# Patient Record
Sex: FEMALE | Race: WHITE | NOT HISPANIC OR LATINO | Employment: OTHER | URBAN - METROPOLITAN AREA
[De-identification: names, ages, dates, MRNs, and addresses within clinical notes are randomized per-mention and may not be internally consistent; named-entity substitution may affect disease eponyms.]

---

## 2017-02-27 ENCOUNTER — HOSPITAL ENCOUNTER (EMERGENCY)
Facility: HOSPITAL | Age: 72
Discharge: HOME | End: 2017-02-28
Attending: EMERGENCY MEDICINE | Admitting: EMERGENCY MEDICINE

## 2017-02-27 ENCOUNTER — APPOINTMENT (OUTPATIENT)
Dept: GENERAL RADIOLOGY | Facility: HOSPITAL | Age: 72
End: 2017-02-27

## 2017-02-27 DIAGNOSIS — R03.0 ELEVATED BLOOD-PRESSURE READING WITHOUT DIAGNOSIS OF HYPERTENSION: ICD-10-CM

## 2017-02-27 DIAGNOSIS — R09.1 PLEURISY: Primary | ICD-10-CM

## 2017-02-27 LAB
ALBUMIN SERPL-MCNC: 4.9 G/DL (ref 3.2–4.8)
ALBUMIN/GLOB SERPL: 1.5 G/DL (ref 1.5–2.5)
ALP SERPL-CCNC: 92 U/L (ref 25–100)
ALT SERPL W P-5'-P-CCNC: 16 U/L (ref 7–40)
ANION GAP SERPL CALCULATED.3IONS-SCNC: 6 MMOL/L (ref 3–11)
AST SERPL-CCNC: 18 U/L (ref 0–33)
BASOPHILS # BLD AUTO: 0.03 10*3/MM3 (ref 0–0.2)
BASOPHILS NFR BLD AUTO: 0.5 % (ref 0–1)
BILIRUB SERPL-MCNC: 0.5 MG/DL (ref 0.3–1.2)
BUN BLD-MCNC: 12 MG/DL (ref 9–23)
BUN/CREAT SERPL: 17.1 (ref 7–25)
CALCIUM SPEC-SCNC: 10.6 MG/DL (ref 8.7–10.4)
CHLORIDE SERPL-SCNC: 103 MMOL/L (ref 99–109)
CO2 SERPL-SCNC: 31 MMOL/L (ref 20–31)
CREAT BLD-MCNC: 0.7 MG/DL (ref 0.6–1.3)
DEPRECATED RDW RBC AUTO: 43.4 FL (ref 37–54)
EOSINOPHIL # BLD AUTO: 0.3 10*3/MM3 (ref 0.1–0.3)
EOSINOPHIL NFR BLD AUTO: 4.6 % (ref 0–3)
ERYTHROCYTE [DISTWIDTH] IN BLOOD BY AUTOMATED COUNT: 12.8 % (ref 11.3–14.5)
GFR SERPL CREATININE-BSD FRML MDRD: 82 ML/MIN/1.73
GLOBULIN UR ELPH-MCNC: 3.3 GM/DL
GLUCOSE BLD-MCNC: 103 MG/DL (ref 70–100)
HCT VFR BLD AUTO: 39.8 % (ref 34.5–44)
HGB BLD-MCNC: 13.1 G/DL (ref 11.5–15.5)
IMM GRANULOCYTES # BLD: 0 10*3/MM3 (ref 0–0.03)
IMM GRANULOCYTES NFR BLD: 0 % (ref 0–0.6)
LIPASE SERPL-CCNC: 35 U/L (ref 6–51)
LYMPHOCYTES # BLD AUTO: 2.1 10*3/MM3 (ref 0.6–4.8)
LYMPHOCYTES NFR BLD AUTO: 32.1 % (ref 24–44)
MCH RBC QN AUTO: 30.7 PG (ref 27–31)
MCHC RBC AUTO-ENTMCNC: 32.9 G/DL (ref 32–36)
MCV RBC AUTO: 93.2 FL (ref 80–99)
MONOCYTES # BLD AUTO: 0.45 10*3/MM3 (ref 0–1)
MONOCYTES NFR BLD AUTO: 6.9 % (ref 0–12)
NEUTROPHILS # BLD AUTO: 3.66 10*3/MM3 (ref 1.5–8.3)
NEUTROPHILS NFR BLD AUTO: 55.9 % (ref 41–71)
PLATELET # BLD AUTO: 352 10*3/MM3 (ref 150–450)
PMV BLD AUTO: 9.4 FL (ref 6–12)
POTASSIUM BLD-SCNC: 3.8 MMOL/L (ref 3.5–5.5)
PROT SERPL-MCNC: 8.2 G/DL (ref 5.7–8.2)
RBC # BLD AUTO: 4.27 10*6/MM3 (ref 3.89–5.14)
SODIUM BLD-SCNC: 140 MMOL/L (ref 132–146)
TROPONIN I SERPL-MCNC: 0 NG/ML (ref 0–0.07)
WBC NRBC COR # BLD: 6.54 10*3/MM3 (ref 3.5–10.8)

## 2017-02-27 PROCEDURE — 93005 ELECTROCARDIOGRAM TRACING: CPT

## 2017-02-27 PROCEDURE — 83690 ASSAY OF LIPASE: CPT | Performed by: EMERGENCY MEDICINE

## 2017-02-27 PROCEDURE — 71020 HC CHEST PA AND LATERAL: CPT

## 2017-02-27 PROCEDURE — 85025 COMPLETE CBC W/AUTO DIFF WBC: CPT | Performed by: EMERGENCY MEDICINE

## 2017-02-27 PROCEDURE — 99284 EMERGENCY DEPT VISIT MOD MDM: CPT

## 2017-02-27 PROCEDURE — 83880 ASSAY OF NATRIURETIC PEPTIDE: CPT | Performed by: EMERGENCY MEDICINE

## 2017-02-27 PROCEDURE — 80053 COMPREHEN METABOLIC PANEL: CPT | Performed by: EMERGENCY MEDICINE

## 2017-02-27 PROCEDURE — 84484 ASSAY OF TROPONIN QUANT: CPT

## 2017-02-27 RX ORDER — ASPIRIN 81 MG/1
324 TABLET, CHEWABLE ORAL ONCE
Status: COMPLETED | OUTPATIENT
Start: 2017-02-27 | End: 2017-02-28

## 2017-02-27 RX ORDER — SODIUM CHLORIDE 0.9 % (FLUSH) 0.9 %
10 SYRINGE (ML) INJECTION AS NEEDED
Status: DISCONTINUED | OUTPATIENT
Start: 2017-02-27 | End: 2017-02-28 | Stop reason: HOSPADM

## 2017-02-28 ENCOUNTER — APPOINTMENT (OUTPATIENT)
Dept: CT IMAGING | Facility: HOSPITAL | Age: 72
End: 2017-02-28

## 2017-02-28 VITALS
OXYGEN SATURATION: 94 % | BODY MASS INDEX: 22.78 KG/M2 | SYSTOLIC BLOOD PRESSURE: 123 MMHG | HEART RATE: 90 BPM | DIASTOLIC BLOOD PRESSURE: 58 MMHG | WEIGHT: 116 LBS | HEIGHT: 60 IN | TEMPERATURE: 97.5 F | RESPIRATION RATE: 20 BRPM

## 2017-02-28 LAB
BNP SERPL-MCNC: 40 PG/ML (ref 0–100)
HOLD SPECIMEN: NORMAL
HOLD SPECIMEN: NORMAL
WHOLE BLOOD HOLD SPECIMEN: NORMAL
WHOLE BLOOD HOLD SPECIMEN: NORMAL

## 2017-02-28 PROCEDURE — 71275 CT ANGIOGRAPHY CHEST: CPT

## 2017-02-28 PROCEDURE — 96361 HYDRATE IV INFUSION ADD-ON: CPT

## 2017-02-28 PROCEDURE — 96374 THER/PROPH/DIAG INJ IV PUSH: CPT

## 2017-02-28 PROCEDURE — 93005 ELECTROCARDIOGRAM TRACING: CPT | Performed by: EMERGENCY MEDICINE

## 2017-02-28 PROCEDURE — 25010000002 KETOROLAC TROMETHAMINE PER 15 MG: Performed by: EMERGENCY MEDICINE

## 2017-02-28 PROCEDURE — 0 IOPAMIDOL PER 1 ML: Performed by: EMERGENCY MEDICINE

## 2017-02-28 RX ORDER — KETOROLAC TROMETHAMINE 15 MG/ML
15 INJECTION, SOLUTION INTRAMUSCULAR; INTRAVENOUS ONCE
Status: COMPLETED | OUTPATIENT
Start: 2017-02-28 | End: 2017-02-28

## 2017-02-28 RX ADMIN — ASPIRIN 81 MG 324 MG: 81 TABLET ORAL at 00:31

## 2017-02-28 RX ADMIN — SODIUM CHLORIDE 1000 ML: 9 INJECTION, SOLUTION INTRAVENOUS at 00:31

## 2017-02-28 RX ADMIN — KETOROLAC TROMETHAMINE 15 MG: 15 INJECTION, SOLUTION INTRAMUSCULAR; INTRAVENOUS at 00:31

## 2017-02-28 RX ADMIN — IOPAMIDOL 40 ML: 755 INJECTION, SOLUTION INTRAVENOUS at 00:58

## 2017-06-26 ENCOUNTER — ANESTHESIA EVENT (OUTPATIENT)
Dept: PERIOP | Facility: HOSPITAL | Age: 72
End: 2017-06-26

## 2017-06-26 ENCOUNTER — ANESTHESIA (OUTPATIENT)
Dept: PERIOP | Facility: HOSPITAL | Age: 72
End: 2017-06-26

## 2017-06-26 ENCOUNTER — APPOINTMENT (OUTPATIENT)
Dept: CT IMAGING | Facility: HOSPITAL | Age: 72
End: 2017-06-26

## 2017-06-26 ENCOUNTER — APPOINTMENT (OUTPATIENT)
Dept: GENERAL RADIOLOGY | Facility: HOSPITAL | Age: 72
End: 2017-06-26

## 2017-06-26 ENCOUNTER — HOSPITAL ENCOUNTER (INPATIENT)
Facility: HOSPITAL | Age: 72
LOS: 3 days | Discharge: HOME | End: 2017-06-29
Attending: EMERGENCY MEDICINE | Admitting: SURGERY

## 2017-06-26 DIAGNOSIS — K56.609 SMALL BOWEL OBSTRUCTION (HCC): Primary | ICD-10-CM

## 2017-06-26 LAB
ALBUMIN SERPL-MCNC: 4.5 G/DL (ref 3.5–5)
ALBUMIN/GLOB SERPL: 1.6 G/DL (ref 1–2)
ALP SERPL-CCNC: 91 U/L (ref 38–126)
ALT SERPL W P-5'-P-CCNC: 22 U/L (ref 13–69)
ANION GAP SERPL CALCULATED.3IONS-SCNC: 15.9 MMOL/L
AST SERPL-CCNC: 18 U/L (ref 15–46)
BASOPHILS # BLD AUTO: 0.04 10*3/MM3 (ref 0–0.2)
BASOPHILS NFR BLD AUTO: 0.6 % (ref 0–2.5)
BILIRUB SERPL-MCNC: 0.5 MG/DL (ref 0.2–1.3)
BILIRUB UR QL STRIP: NEGATIVE
BUN BLD-MCNC: 17 MG/DL (ref 7–20)
BUN/CREAT SERPL: 21.3 (ref 7.1–23.5)
CALCIUM SPEC-SCNC: 10.2 MG/DL (ref 8.4–10.2)
CHLORIDE SERPL-SCNC: 103 MMOL/L (ref 98–107)
CLARITY UR: CLEAR
CO2 SERPL-SCNC: 27 MMOL/L (ref 26–30)
COLOR UR: YELLOW
CREAT BLD-MCNC: 0.8 MG/DL (ref 0.6–1.3)
D-LACTATE SERPL-SCNC: 1.1 MMOL/L (ref 0.5–2)
DEPRECATED RDW RBC AUTO: 39.8 FL (ref 37–54)
EOSINOPHIL # BLD AUTO: 0.25 10*3/MM3 (ref 0–0.7)
EOSINOPHIL NFR BLD AUTO: 4 % (ref 0–7)
ERYTHROCYTE [DISTWIDTH] IN BLOOD BY AUTOMATED COUNT: 12 % (ref 11.5–14.5)
GFR SERPL CREATININE-BSD FRML MDRD: 70 ML/MIN/1.73
GLOBULIN UR ELPH-MCNC: 2.8 GM/DL
GLUCOSE BLD-MCNC: 125 MG/DL (ref 74–98)
GLUCOSE UR STRIP-MCNC: NEGATIVE MG/DL
HCT VFR BLD AUTO: 36.3 % (ref 37–47)
HGB BLD-MCNC: 12 G/DL (ref 12–16)
HGB UR QL STRIP.AUTO: NEGATIVE
HOLD SPECIMEN: NORMAL
HOLD SPECIMEN: NORMAL
IMM GRANULOCYTES # BLD: 0.02 10*3/MM3 (ref 0–0.06)
IMM GRANULOCYTES NFR BLD: 0.3 % (ref 0–0.6)
KETONES UR QL STRIP: NEGATIVE
LEUKOCYTE ESTERASE UR QL STRIP.AUTO: NEGATIVE
LIPASE SERPL-CCNC: 116 U/L (ref 23–300)
LYMPHOCYTES # BLD AUTO: 1.86 10*3/MM3 (ref 0.6–3.4)
LYMPHOCYTES NFR BLD AUTO: 29.6 % (ref 10–50)
MCH RBC QN AUTO: 29.7 PG (ref 27–31)
MCHC RBC AUTO-ENTMCNC: 33.1 G/DL (ref 30–37)
MCV RBC AUTO: 89.9 FL (ref 81–99)
MONOCYTES # BLD AUTO: 0.39 10*3/MM3 (ref 0–0.9)
MONOCYTES NFR BLD AUTO: 6.2 % (ref 0–12)
NEUTROPHILS # BLD AUTO: 3.73 10*3/MM3 (ref 2–6.9)
NEUTROPHILS NFR BLD AUTO: 59.3 % (ref 37–80)
NITRITE UR QL STRIP: NEGATIVE
NRBC BLD MANUAL-RTO: 0 /100 WBC (ref 0–0)
PH UR STRIP.AUTO: 7 [PH] (ref 5–8)
PLATELET # BLD AUTO: 324 10*3/MM3 (ref 130–400)
PMV BLD AUTO: 9.9 FL (ref 6–12)
POTASSIUM BLD-SCNC: 3.9 MMOL/L (ref 3.5–5.1)
PROT SERPL-MCNC: 7.3 G/DL (ref 6.3–8.2)
PROT UR QL STRIP: NEGATIVE
RBC # BLD AUTO: 4.04 10*6/MM3 (ref 4.2–5.4)
SODIUM BLD-SCNC: 142 MMOL/L (ref 137–145)
SP GR UR STRIP: 1.01 (ref 1–1.03)
UROBILINOGEN UR QL STRIP: NORMAL
WBC NRBC COR # BLD: 6.29 10*3/MM3 (ref 4.8–10.8)
WHOLE BLOOD HOLD SPECIMEN: NORMAL
WHOLE BLOOD HOLD SPECIMEN: NORMAL

## 2017-06-26 PROCEDURE — 93005 ELECTROCARDIOGRAM TRACING: CPT | Performed by: EMERGENCY MEDICINE

## 2017-06-26 PROCEDURE — 81003 URINALYSIS AUTO W/O SCOPE: CPT | Performed by: EMERGENCY MEDICINE

## 2017-06-26 PROCEDURE — 83690 ASSAY OF LIPASE: CPT | Performed by: EMERGENCY MEDICINE

## 2017-06-26 PROCEDURE — 25010000003 AMPICILLIN-SULBACTAM PER 1.5 G: Performed by: SURGERY

## 2017-06-26 PROCEDURE — 25010000002 HYDROMORPHONE PER 4 MG

## 2017-06-26 PROCEDURE — 25010000002 MORPHINE PER 10 MG: Performed by: EMERGENCY MEDICINE

## 2017-06-26 PROCEDURE — 25010000002 PROPOFOL 200 MG/20ML EMULSION: Performed by: NURSE ANESTHETIST, CERTIFIED REGISTERED

## 2017-06-26 PROCEDURE — 25010000002 FENTANYL CITRATE (PF) 100 MCG/2ML SOLUTION: Performed by: NURSE ANESTHETIST, CERTIFIED REGISTERED

## 2017-06-26 PROCEDURE — 25010000002 SUCCINYLCHOLINE PER 20 MG: Performed by: NURSE ANESTHETIST, CERTIFIED REGISTERED

## 2017-06-26 PROCEDURE — 83605 ASSAY OF LACTIC ACID: CPT | Performed by: EMERGENCY MEDICINE

## 2017-06-26 PROCEDURE — 0 DIATRIZOATE MEGLUMINE & SODIUM PER 1 ML: Performed by: EMERGENCY MEDICINE

## 2017-06-26 PROCEDURE — 99285 EMERGENCY DEPT VISIT HI MDM: CPT

## 2017-06-26 PROCEDURE — 25010000002 PROMETHAZINE PER 50 MG

## 2017-06-26 PROCEDURE — 0DN80ZZ RELEASE SMALL INTESTINE, OPEN APPROACH: ICD-10-PCS | Performed by: SURGERY

## 2017-06-26 PROCEDURE — 0 IOPAMIDOL 61 % SOLUTION: Performed by: EMERGENCY MEDICINE

## 2017-06-26 PROCEDURE — 99223 1ST HOSP IP/OBS HIGH 75: CPT | Performed by: SURGERY

## 2017-06-26 PROCEDURE — 25010000002 ONDANSETRON PER 1 MG: Performed by: EMERGENCY MEDICINE

## 2017-06-26 PROCEDURE — 25010000002 HYDROMORPHONE PER 4 MG: Performed by: SURGERY

## 2017-06-26 PROCEDURE — 25010000002 ONDANSETRON PER 1 MG: Performed by: NURSE ANESTHETIST, CERTIFIED REGISTERED

## 2017-06-26 PROCEDURE — 80053 COMPREHEN METABOLIC PANEL: CPT | Performed by: EMERGENCY MEDICINE

## 2017-06-26 PROCEDURE — 44050 REDUCE BOWEL OBSTRUCTION: CPT | Performed by: SURGERY

## 2017-06-26 PROCEDURE — 25010000002 HEPARIN (PORCINE) PER 1000 UNITS

## 2017-06-26 PROCEDURE — 25010000002 DEXAMETHASONE PER 1 MG: Performed by: NURSE ANESTHETIST, CERTIFIED REGISTERED

## 2017-06-26 PROCEDURE — 74020 HC XR ABDOMEN FLAT & UPRIGHT: CPT

## 2017-06-26 PROCEDURE — 94799 UNLISTED PULMONARY SVC/PX: CPT

## 2017-06-26 PROCEDURE — 85025 COMPLETE CBC W/AUTO DIFF WBC: CPT | Performed by: EMERGENCY MEDICINE

## 2017-06-26 PROCEDURE — 74177 CT ABD & PELVIS W/CONTRAST: CPT

## 2017-06-26 PROCEDURE — 25010000002 HYDROMORPHONE PER 4 MG: Performed by: EMERGENCY MEDICINE

## 2017-06-26 RX ORDER — PANTOPRAZOLE SODIUM 40 MG/10ML
INJECTION, POWDER, LYOPHILIZED, FOR SOLUTION INTRAVENOUS
Status: COMPLETED
Start: 2017-06-26 | End: 2017-06-26

## 2017-06-26 RX ORDER — HEPARIN SODIUM 5000 [USP'U]/ML
5000 INJECTION, SOLUTION INTRAVENOUS; SUBCUTANEOUS ONCE
Status: COMPLETED | OUTPATIENT
Start: 2017-06-26 | End: 2017-06-26

## 2017-06-26 RX ORDER — SODIUM CHLORIDE, SODIUM LACTATE, POTASSIUM CHLORIDE, CALCIUM CHLORIDE 600; 310; 30; 20 MG/100ML; MG/100ML; MG/100ML; MG/100ML
1000 INJECTION, SOLUTION INTRAVENOUS ONCE
Status: COMPLETED | OUTPATIENT
Start: 2017-06-26 | End: 2017-06-26

## 2017-06-26 RX ORDER — PROMETHAZINE HYDROCHLORIDE 25 MG/1
25 TABLET ORAL ONCE AS NEEDED
Status: DISCONTINUED | OUTPATIENT
Start: 2017-06-26 | End: 2017-06-26 | Stop reason: HOSPADM

## 2017-06-26 RX ORDER — HYDROCODONE BITARTRATE AND ACETAMINOPHEN 7.5; 325 MG/1; MG/1
1 TABLET ORAL ONCE AS NEEDED
Status: DISCONTINUED | OUTPATIENT
Start: 2017-06-26 | End: 2017-06-26 | Stop reason: HOSPADM

## 2017-06-26 RX ORDER — SODIUM CHLORIDE 0.9 % (FLUSH) 0.9 %
10 SYRINGE (ML) INJECTION AS NEEDED
Status: DISCONTINUED | OUTPATIENT
Start: 2017-06-26 | End: 2017-06-29 | Stop reason: HOSPADM

## 2017-06-26 RX ORDER — DEXAMETHASONE SODIUM PHOSPHATE 4 MG/ML
INJECTION, SOLUTION INTRA-ARTICULAR; INTRALESIONAL; INTRAMUSCULAR; INTRAVENOUS; SOFT TISSUE AS NEEDED
Status: DISCONTINUED | OUTPATIENT
Start: 2017-06-26 | End: 2017-06-26 | Stop reason: SURG

## 2017-06-26 RX ORDER — ONDANSETRON 2 MG/ML
4 INJECTION INTRAMUSCULAR; INTRAVENOUS ONCE
Status: COMPLETED | OUTPATIENT
Start: 2017-06-26 | End: 2017-06-26

## 2017-06-26 RX ORDER — ONDANSETRON 4 MG/1
4 TABLET, ORALLY DISINTEGRATING ORAL EVERY 6 HOURS PRN
Status: DISCONTINUED | OUTPATIENT
Start: 2017-06-26 | End: 2017-06-29 | Stop reason: HOSPADM

## 2017-06-26 RX ORDER — NEOSTIGMINE METHYLSULFATE 5 MG/5 ML
SYRINGE (ML) INTRAVENOUS AS NEEDED
Status: DISCONTINUED | OUTPATIENT
Start: 2017-06-26 | End: 2017-06-26 | Stop reason: SURG

## 2017-06-26 RX ORDER — DEXTROSE AND SODIUM CHLORIDE 5; .45 G/100ML; G/100ML
100 INJECTION, SOLUTION INTRAVENOUS CONTINUOUS
Status: DISCONTINUED | OUTPATIENT
Start: 2017-06-26 | End: 2017-06-29 | Stop reason: HOSPADM

## 2017-06-26 RX ORDER — PROMETHAZINE HYDROCHLORIDE 25 MG/1
25 SUPPOSITORY RECTAL ONCE AS NEEDED
Status: DISCONTINUED | OUTPATIENT
Start: 2017-06-26 | End: 2017-06-26 | Stop reason: HOSPADM

## 2017-06-26 RX ORDER — HYDROCODONE BITARTRATE AND ACETAMINOPHEN 7.5; 325 MG/1; MG/1
1 TABLET ORAL EVERY 4 HOURS PRN
Status: DISCONTINUED | OUTPATIENT
Start: 2017-06-26 | End: 2017-06-29 | Stop reason: HOSPADM

## 2017-06-26 RX ORDER — SUCCINYLCHOLINE CHLORIDE 20 MG/ML
INJECTION INTRAMUSCULAR; INTRAVENOUS AS NEEDED
Status: DISCONTINUED | OUTPATIENT
Start: 2017-06-26 | End: 2017-06-26 | Stop reason: SURG

## 2017-06-26 RX ORDER — GLYCOPYRROLATE 0.2 MG/ML
INJECTION INTRAMUSCULAR; INTRAVENOUS AS NEEDED
Status: DISCONTINUED | OUTPATIENT
Start: 2017-06-26 | End: 2017-06-26 | Stop reason: SURG

## 2017-06-26 RX ORDER — MORPHINE SULFATE 4 MG/ML
4 INJECTION, SOLUTION INTRAMUSCULAR; INTRAVENOUS ONCE
Status: COMPLETED | OUTPATIENT
Start: 2017-06-26 | End: 2017-06-26

## 2017-06-26 RX ORDER — PROPOFOL 10 MG/ML
INJECTION, EMULSION INTRAVENOUS AS NEEDED
Status: DISCONTINUED | OUTPATIENT
Start: 2017-06-26 | End: 2017-06-26 | Stop reason: SURG

## 2017-06-26 RX ORDER — SODIUM CHLORIDE, SODIUM LACTATE, POTASSIUM CHLORIDE, CALCIUM CHLORIDE 600; 310; 30; 20 MG/100ML; MG/100ML; MG/100ML; MG/100ML
INJECTION, SOLUTION INTRAVENOUS CONTINUOUS PRN
Status: DISCONTINUED | OUTPATIENT
Start: 2017-06-26 | End: 2017-06-26 | Stop reason: SURG

## 2017-06-26 RX ORDER — MAGNESIUM HYDROXIDE 1200 MG/15ML
LIQUID ORAL AS NEEDED
Status: DISCONTINUED | OUTPATIENT
Start: 2017-06-26 | End: 2017-06-26 | Stop reason: HOSPADM

## 2017-06-26 RX ORDER — PANTOPRAZOLE SODIUM 40 MG/10ML
40 INJECTION, POWDER, LYOPHILIZED, FOR SOLUTION INTRAVENOUS ONCE
Status: COMPLETED | OUTPATIENT
Start: 2017-06-26 | End: 2017-06-26

## 2017-06-26 RX ORDER — LIDOCAINE HYDROCHLORIDE 10 MG/ML
0.5 INJECTION, SOLUTION INFILTRATION; PERINEURAL ONCE AS NEEDED
Status: DISCONTINUED | OUTPATIENT
Start: 2017-06-26 | End: 2017-06-26 | Stop reason: HOSPADM

## 2017-06-26 RX ORDER — ACETAMINOPHEN 10 MG/ML
INJECTION, SOLUTION INTRAVENOUS AS NEEDED
Status: DISCONTINUED | OUTPATIENT
Start: 2017-06-26 | End: 2017-06-26 | Stop reason: SURG

## 2017-06-26 RX ORDER — PROMETHAZINE HYDROCHLORIDE 25 MG/ML
INJECTION, SOLUTION INTRAMUSCULAR; INTRAVENOUS
Status: COMPLETED
Start: 2017-06-26 | End: 2017-06-26

## 2017-06-26 RX ORDER — SODIUM CHLORIDE 0.9 % (FLUSH) 0.9 %
1-10 SYRINGE (ML) INJECTION AS NEEDED
Status: DISCONTINUED | OUTPATIENT
Start: 2017-06-26 | End: 2017-06-29 | Stop reason: HOSPADM

## 2017-06-26 RX ORDER — ACETAMINOPHEN 10 MG/ML
INJECTION, SOLUTION INTRAVENOUS
Status: DISPENSED
Start: 2017-06-26 | End: 2017-06-26

## 2017-06-26 RX ORDER — ONDANSETRON 4 MG/1
4 TABLET, FILM COATED ORAL EVERY 6 HOURS PRN
Status: DISCONTINUED | OUTPATIENT
Start: 2017-06-26 | End: 2017-06-29 | Stop reason: HOSPADM

## 2017-06-26 RX ORDER — SODIUM CHLORIDE, SODIUM LACTATE, POTASSIUM CHLORIDE, CALCIUM CHLORIDE 600; 310; 30; 20 MG/100ML; MG/100ML; MG/100ML; MG/100ML
1000 INJECTION, SOLUTION INTRAVENOUS CONTINUOUS PRN
Status: DISCONTINUED | OUTPATIENT
Start: 2017-06-26 | End: 2017-06-26 | Stop reason: HOSPADM

## 2017-06-26 RX ORDER — ONDANSETRON 2 MG/ML
4 INJECTION INTRAMUSCULAR; INTRAVENOUS ONCE AS NEEDED
Status: DISCONTINUED | OUTPATIENT
Start: 2017-06-26 | End: 2017-06-26 | Stop reason: HOSPADM

## 2017-06-26 RX ORDER — ONDANSETRON 2 MG/ML
4 INJECTION INTRAMUSCULAR; INTRAVENOUS EVERY 6 HOURS PRN
Status: DISCONTINUED | OUTPATIENT
Start: 2017-06-26 | End: 2017-06-29 | Stop reason: HOSPADM

## 2017-06-26 RX ORDER — BUPIVACAINE HYDROCHLORIDE 5 MG/ML
INJECTION, SOLUTION EPIDURAL; INTRACAUDAL
Status: DISCONTINUED
Start: 2017-06-26 | End: 2017-06-26 | Stop reason: WASHOUT

## 2017-06-26 RX ORDER — PROMETHAZINE HYDROCHLORIDE 25 MG/ML
12.5 INJECTION, SOLUTION INTRAMUSCULAR; INTRAVENOUS ONCE
Status: COMPLETED | OUTPATIENT
Start: 2017-06-26 | End: 2017-06-26

## 2017-06-26 RX ORDER — PROMETHAZINE HYDROCHLORIDE 25 MG/ML
6.25 INJECTION, SOLUTION INTRAMUSCULAR; INTRAVENOUS ONCE AS NEEDED
Status: DISCONTINUED | OUTPATIENT
Start: 2017-06-26 | End: 2017-06-26 | Stop reason: HOSPADM

## 2017-06-26 RX ORDER — ONDANSETRON 2 MG/ML
INJECTION INTRAMUSCULAR; INTRAVENOUS AS NEEDED
Status: DISCONTINUED | OUTPATIENT
Start: 2017-06-26 | End: 2017-06-26 | Stop reason: SURG

## 2017-06-26 RX ORDER — FENTANYL CITRATE 50 UG/ML
INJECTION, SOLUTION INTRAMUSCULAR; INTRAVENOUS AS NEEDED
Status: DISCONTINUED | OUTPATIENT
Start: 2017-06-26 | End: 2017-06-26 | Stop reason: SURG

## 2017-06-26 RX ORDER — ACETAMINOPHEN 325 MG/1
650 TABLET ORAL EVERY 4 HOURS PRN
Status: DISCONTINUED | OUTPATIENT
Start: 2017-06-26 | End: 2017-06-29 | Stop reason: HOSPADM

## 2017-06-26 RX ORDER — NALOXONE HCL 0.4 MG/ML
0.1 VIAL (ML) INJECTION
Status: DISCONTINUED | OUTPATIENT
Start: 2017-06-26 | End: 2017-06-29 | Stop reason: HOSPADM

## 2017-06-26 RX ORDER — HEPARIN SODIUM 5000 [USP'U]/ML
INJECTION, SOLUTION INTRAVENOUS; SUBCUTANEOUS
Status: COMPLETED
Start: 2017-06-26 | End: 2017-06-26

## 2017-06-26 RX ORDER — ROCURONIUM BROMIDE 10 MG/ML
INJECTION, SOLUTION INTRAVENOUS AS NEEDED
Status: DISCONTINUED | OUTPATIENT
Start: 2017-06-26 | End: 2017-06-26 | Stop reason: SURG

## 2017-06-26 RX ORDER — SODIUM CHLORIDE 0.9 % (FLUSH) 0.9 %
3 SYRINGE (ML) INJECTION AS NEEDED
Status: DISCONTINUED | OUTPATIENT
Start: 2017-06-26 | End: 2017-06-26 | Stop reason: HOSPADM

## 2017-06-26 RX ORDER — MEPERIDINE HYDROCHLORIDE 25 MG/ML
12.5 INJECTION INTRAMUSCULAR; INTRAVENOUS; SUBCUTANEOUS
Status: DISCONTINUED | OUTPATIENT
Start: 2017-06-26 | End: 2017-06-26 | Stop reason: HOSPADM

## 2017-06-26 RX ADMIN — PROPOFOL 200 MG: 10 INJECTION, EMULSION INTRAVENOUS at 08:48

## 2017-06-26 RX ADMIN — GLYCOPYRROLATE 0.4 MG: 0.2 INJECTION, SOLUTION INTRAMUSCULAR; INTRAVENOUS at 09:51

## 2017-06-26 RX ADMIN — HYDROCODONE BITARTRATE AND ACETAMINOPHEN 1 TABLET: 7.5; 325 TABLET ORAL at 17:11

## 2017-06-26 RX ADMIN — HYDROMORPHONE HYDROCHLORIDE 0.5 MG: 1 INJECTION, SOLUTION INTRAMUSCULAR; INTRAVENOUS; SUBCUTANEOUS at 12:07

## 2017-06-26 RX ADMIN — HEPARIN SODIUM 5000 UNITS: 5000 INJECTION, SOLUTION INTRAVENOUS; SUBCUTANEOUS at 08:26

## 2017-06-26 RX ADMIN — SODIUM CHLORIDE, POTASSIUM CHLORIDE, SODIUM LACTATE AND CALCIUM CHLORIDE 1000 ML: 600; 310; 30; 20 INJECTION, SOLUTION INTRAVENOUS at 03:58

## 2017-06-26 RX ADMIN — DEXAMETHASONE SODIUM PHOSPHATE 4 MG: 4 INJECTION, SOLUTION INTRAMUSCULAR; INTRAVENOUS at 09:25

## 2017-06-26 RX ADMIN — SODIUM CHLORIDE, POTASSIUM CHLORIDE, SODIUM LACTATE AND CALCIUM CHLORIDE 1000 ML: 600; 310; 30; 20 INJECTION, SOLUTION INTRAVENOUS at 08:20

## 2017-06-26 RX ADMIN — Medication 0.5 MG: at 11:04

## 2017-06-26 RX ADMIN — PROMETHAZINE HYDROCHLORIDE 12.5 MG: 25 INJECTION INTRAMUSCULAR; INTRAVENOUS at 04:09

## 2017-06-26 RX ADMIN — MORPHINE SULFATE 4 MG: 4 INJECTION, SOLUTION INTRAMUSCULAR; INTRAVENOUS at 03:58

## 2017-06-26 RX ADMIN — HYDROCODONE BITARTRATE AND ACETAMINOPHEN 1 TABLET: 7.5; 325 TABLET ORAL at 23:21

## 2017-06-26 RX ADMIN — PANTOPRAZOLE SODIUM 40 MG: 40 INJECTION, POWDER, FOR SOLUTION INTRAVENOUS at 08:26

## 2017-06-26 RX ADMIN — ONDANSETRON 4 MG: 2 INJECTION INTRAMUSCULAR; INTRAVENOUS at 03:58

## 2017-06-26 RX ADMIN — LIDOCAINE HYDROCHLORIDE 60 MG: 20 INJECTION, SOLUTION INTRAVENOUS at 08:48

## 2017-06-26 RX ADMIN — HYDROMORPHONE HYDROCHLORIDE 0.5 MG: 1 INJECTION, SOLUTION INTRAMUSCULAR; INTRAVENOUS; SUBCUTANEOUS at 06:15

## 2017-06-26 RX ADMIN — Medication 3 MG: at 09:51

## 2017-06-26 RX ADMIN — DIATRIZOATE MEGLUMINE AND DIATRIZOATE SODIUM 15 ML: 660; 100 LIQUID ORAL; RECTAL at 07:36

## 2017-06-26 RX ADMIN — ACETAMINOPHEN 1000 MG: 10 INJECTION, SOLUTION INTRAVENOUS at 09:09

## 2017-06-26 RX ADMIN — DEXTROSE AND SODIUM CHLORIDE 100 ML/HR: 5; 450 INJECTION, SOLUTION INTRAVENOUS at 10:30

## 2017-06-26 RX ADMIN — SUCCINYLCHOLINE CHLORIDE 104.4 MG: 20 INJECTION, SOLUTION INTRAMUSCULAR; INTRAVENOUS at 08:48

## 2017-06-26 RX ADMIN — ONDANSETRON 4 MG: 2 INJECTION INTRAMUSCULAR; INTRAVENOUS at 06:23

## 2017-06-26 RX ADMIN — ROCURONIUM BROMIDE 5 MG: 10 INJECTION INTRAVENOUS at 08:48

## 2017-06-26 RX ADMIN — SODIUM CHLORIDE 3 G: 9 INJECTION, SOLUTION INTRAVENOUS at 08:52

## 2017-06-26 RX ADMIN — SODIUM CHLORIDE, POTASSIUM CHLORIDE, SODIUM LACTATE AND CALCIUM CHLORIDE: 600; 310; 30; 20 INJECTION, SOLUTION INTRAVENOUS at 08:58

## 2017-06-26 RX ADMIN — ROCURONIUM BROMIDE 20 MG: 10 INJECTION INTRAVENOUS at 09:03

## 2017-06-26 RX ADMIN — PROMETHAZINE HYDROCHLORIDE 12.5 MG: 25 INJECTION, SOLUTION INTRAMUSCULAR; INTRAVENOUS at 04:09

## 2017-06-26 RX ADMIN — FENTANYL CITRATE 100 MCG: 50 INJECTION, SOLUTION INTRAMUSCULAR; INTRAVENOUS at 08:45

## 2017-06-26 RX ADMIN — ONDANSETRON 4 MG: 2 INJECTION INTRAMUSCULAR; INTRAVENOUS at 09:25

## 2017-06-26 RX ADMIN — SODIUM CHLORIDE, POTASSIUM CHLORIDE, SODIUM LACTATE AND CALCIUM CHLORIDE: 600; 310; 30; 20 INJECTION, SOLUTION INTRAVENOUS at 09:35

## 2017-06-26 RX ADMIN — IOPAMIDOL 100 ML: 612 INJECTION, SOLUTION INTRAVENOUS at 04:50

## 2017-06-26 RX ADMIN — PANTOPRAZOLE SODIUM 40 MG: 40 INJECTION, POWDER, LYOPHILIZED, FOR SOLUTION INTRAVENOUS at 08:26

## 2017-06-26 RX ADMIN — HYDROMORPHONE HYDROCHLORIDE 0.5 MG: 1 INJECTION, SOLUTION INTRAMUSCULAR; INTRAVENOUS; SUBCUTANEOUS at 11:04

## 2017-06-26 NOTE — ANESTHESIA PREPROCEDURE EVALUATION
Anesthesia Evaluation     Patient summary reviewed and Nursing notes reviewed   NPO Solid Status: > 8 hours  NPO Liquid Status: > 8 hours     Airway   Mallampati: II  TM distance: >3 FB  Neck ROM: full  no difficulty expected  Dental      Pulmonary    Cardiovascular   Exercise tolerance: excellent (>7 METS)    ECG reviewed  Rhythm: regular  Rate: normal    (+) valvular problems/murmurs murmur,       Neuro/Psych  GI/Hepatic/Renal/Endo    (+)  GERD,     Musculoskeletal     Abdominal    Substance History      OB/GYN          Other   (+) arthritis                                     Anesthesia Plan    ASA 2 - emergent     general and regional   (Discussed possible TAP block for postop pain control .)  intravenous induction   Anesthetic plan and risks discussed with patient.    Plan discussed with CRNA.

## 2017-06-26 NOTE — ANESTHESIA POSTPROCEDURE EVALUATION
"Patient: Prisca Ellis    Procedure Summary     Date Anesthesia Start Anesthesia Stop Room / Location    06/26/17 0844 1012 [facility] OR 5 / [facility]       Procedure Diagnosis Surgeon Provider    LAPAROTOMY EXPLORATORY WITH LYSIS OF ADHESIONS; REDUCTION OF HERNIA   (N/A Abdomen) No diagnosis on file. MD Patricia Woodward CRNA          Anesthesia Type: general, regional  Last vitals    /50 (BP Location: Left arm, Patient Position: Lying)  Pulse 96  Temp 98.3 °F (36.8 °C) (Oral)   Resp 16  Ht 60\" (152.4 cm)  Wt 115 lb (52.2 kg)  SpO2 100%  BMI 22.46 kg/m2    BP      Temp      Pulse     Resp      SpO2        Post Anesthesia Care and Evaluation    Patient location during evaluation: PACU  Patient participation: complete - patient participated  Level of consciousness: awake and alert  Pain score: 3  Pain management: satisfactory to patient  Airway patency: patent  Anesthetic complications: No anesthetic complications  PONV Status: none  Cardiovascular status: acceptable and stable  Respiratory status: acceptable  Hydration status: acceptable      "

## 2017-06-26 NOTE — ANESTHESIA PROCEDURE NOTES
Airway  Airway not difficult    General Information and Staff    Patient location during procedure: OR  CRNA: PEPPER URBANO    Indications and Patient Condition  Indications for airway management: airway protection    Preoxygenated: yes  Mask difficulty assessment: 0 - not attempted (RSI)    Final Airway Details  Final airway type: endotracheal airway      Successful airway: ETT  Cuffed: yes   Successful intubation technique: direct laryngoscopy  Facilitating devices/methods: intubating stylet  Endotracheal tube insertion site: oral  Blade: Rolly  Blade size: #3  ETT size: 7.0 mm  Cormack-Lehane Classification: grade I - full view of glottis  Placement verified by: chest auscultation and capnometry   Cuff volume (mL): 4  Measured from: lips  ETT to lips (cm): 18  Number of attempts at approach: 1    Additional Comments  Teeth as pre-op

## 2017-06-26 NOTE — ANESTHESIA PROCEDURE NOTES
Peripheral Block    Patient location during procedure: OR  Start time: 6/26/2017 9:52 AM  Stop time: 6/26/2017 10:00 AM  Reason for block: at surgeon's request and post-op pain management  Performed by  CRNA: PEPPER URBANO  Preanesthetic Checklist  Completed: patient identified, site marked, surgical consent, pre-op evaluation, timeout performed, IV checked, risks and benefits discussed and monitors and equipment checked  Peripheral Block Prep:  Sterile barriers:cap, gloves, sterile barriers, mask and gown  Prep: ChloraPrep  Patient monitoring: blood pressure monitoring, continuous pulse oximetry and EKG  Peripheral Procedure  Sedation:yes (under GA)  Guidance:ultrasound guided  Images:still images obtained    Laterality:Bilateral  Block Type:TAP (bilateral)  Injection Technique:single-shot  Needle Type:short-bevel  Needle Gauge:20 G    Medications  Comment:Block Injection:  LA dose divided between Right and Left block      Local Injected:bupivacaine 0.25% Local Amount Injected:60mL  Post Assessment  Injection Assessment: negative aspiration for heme, incremental injection and no paresthesia on injection  Patient Tolerance:comfortable throughout block  Complications:no  Additional Notes  The pt was placed in the Supine Position     Under Ultrasound guidance, a BBraun 4inch 360 degree needle was advanced with Normal Saline hydro dissection of tissue.  The Internal Oblique and Transversus Abdominus muscles where visualized.  At or before the aponeurosis of Internal Oblique, local anesthetic spread was visualized in the Transversus Abdominus Plane. Injection was made incrementally with aspiration every 5 mls.  There was no  intravascular injection,  injection pressure was normal, there was no neural injection, and the procedure was completed without difficulty.  Thank You.

## 2017-06-27 PROCEDURE — 99024 POSTOP FOLLOW-UP VISIT: CPT | Performed by: SURGERY

## 2017-06-27 PROCEDURE — 25010000002 HYDROMORPHONE PER 4 MG: Performed by: SURGERY

## 2017-06-27 RX ORDER — DOCUSATE SODIUM 100 MG/1
100 CAPSULE, LIQUID FILLED ORAL 2 TIMES DAILY
Status: DISCONTINUED | OUTPATIENT
Start: 2017-06-27 | End: 2017-06-29 | Stop reason: HOSPADM

## 2017-06-27 RX ADMIN — DOCUSATE SODIUM 100 MG: 100 CAPSULE, LIQUID FILLED ORAL at 21:20

## 2017-06-27 RX ADMIN — HYDROCODONE BITARTRATE AND ACETAMINOPHEN 1 TABLET: 7.5; 325 TABLET ORAL at 09:43

## 2017-06-27 RX ADMIN — HYDROCODONE BITARTRATE AND ACETAMINOPHEN 1 TABLET: 7.5; 325 TABLET ORAL at 17:43

## 2017-06-27 RX ADMIN — HYDROMORPHONE HYDROCHLORIDE 0.5 MG: 1 INJECTION, SOLUTION INTRAMUSCULAR; INTRAVENOUS; SUBCUTANEOUS at 04:08

## 2017-06-27 RX ADMIN — DOCUSATE SODIUM 100 MG: 100 CAPSULE, LIQUID FILLED ORAL at 09:43

## 2017-06-27 RX ADMIN — HYDROCODONE BITARTRATE AND ACETAMINOPHEN 1 TABLET: 7.5; 325 TABLET ORAL at 14:07

## 2017-06-27 RX ADMIN — ONDANSETRON 4 MG: 4 TABLET, FILM COATED ORAL at 19:45

## 2017-06-28 PROCEDURE — 99024 POSTOP FOLLOW-UP VISIT: CPT | Performed by: SURGERY

## 2017-06-28 RX ADMIN — NYSTATIN 500000 UNITS: 500000 SUSPENSION ORAL at 17:38

## 2017-06-28 RX ADMIN — HYDROCODONE BITARTRATE AND ACETAMINOPHEN 1 TABLET: 7.5; 325 TABLET ORAL at 06:12

## 2017-06-28 RX ADMIN — NYSTATIN 500000 UNITS: 500000 SUSPENSION ORAL at 22:15

## 2017-06-28 RX ADMIN — DOCUSATE SODIUM 100 MG: 100 CAPSULE, LIQUID FILLED ORAL at 09:10

## 2017-06-28 RX ADMIN — NYSTATIN 500000 UNITS: 500000 SUSPENSION ORAL at 12:46

## 2017-06-28 RX ADMIN — HYDROCODONE BITARTRATE AND ACETAMINOPHEN 1 TABLET: 7.5; 325 TABLET ORAL at 22:15

## 2017-06-28 RX ADMIN — DOCUSATE SODIUM 100 MG: 100 CAPSULE, LIQUID FILLED ORAL at 20:45

## 2017-06-28 RX ADMIN — ONDANSETRON 4 MG: 4 TABLET, FILM COATED ORAL at 06:12

## 2017-06-29 VITALS
SYSTOLIC BLOOD PRESSURE: 105 MMHG | WEIGHT: 115 LBS | DIASTOLIC BLOOD PRESSURE: 49 MMHG | HEART RATE: 77 BPM | RESPIRATION RATE: 16 BRPM | TEMPERATURE: 97.6 F | BODY MASS INDEX: 22.58 KG/M2 | HEIGHT: 60 IN | OXYGEN SATURATION: 95 %

## 2017-06-29 PROCEDURE — 99024 POSTOP FOLLOW-UP VISIT: CPT | Performed by: SURGERY

## 2017-06-29 RX ORDER — ONDANSETRON 2 MG/ML
4 INJECTION INTRAMUSCULAR; INTRAVENOUS ONCE AS NEEDED
Status: DISCONTINUED | OUTPATIENT
Start: 2017-06-29 | End: 2017-06-29 | Stop reason: HOSPADM

## 2017-06-29 RX ORDER — HYDROCODONE BITARTRATE AND ACETAMINOPHEN 7.5; 325 MG/1; MG/1
1 TABLET ORAL EVERY 4 HOURS PRN
Qty: 30 TABLET | Refills: 0 | Status: SHIPPED | OUTPATIENT
Start: 2017-06-29 | End: 2017-07-06

## 2017-06-29 RX ORDER — PROMETHAZINE HYDROCHLORIDE 25 MG/ML
12.5 INJECTION, SOLUTION INTRAMUSCULAR; INTRAVENOUS ONCE AS NEEDED
Status: DISCONTINUED | OUTPATIENT
Start: 2017-06-29 | End: 2017-06-29 | Stop reason: HOSPADM

## 2017-06-29 RX ORDER — HYDROCODONE BITARTRATE AND ACETAMINOPHEN 5; 325 MG/1; MG/1
1-2 TABLET ORAL EVERY 4 HOURS PRN
Qty: 30 TABLET | Refills: 0 | Status: SHIPPED | OUTPATIENT
Start: 2017-06-29 | End: 2017-07-07

## 2017-06-29 RX ORDER — LORAZEPAM 0.5 MG/1
0.5 TABLET ORAL ONCE
Status: DISCONTINUED | OUTPATIENT
Start: 2017-06-29 | End: 2017-06-29 | Stop reason: HOSPADM

## 2017-06-29 RX ADMIN — NYSTATIN 500000 UNITS: 500000 SUSPENSION ORAL at 12:17

## 2017-06-29 RX ADMIN — NYSTATIN 500000 UNITS: 500000 SUSPENSION ORAL at 09:13

## 2017-06-29 RX ADMIN — DOCUSATE SODIUM 100 MG: 100 CAPSULE, LIQUID FILLED ORAL at 09:13

## 2017-07-07 ENCOUNTER — OFFICE VISIT (OUTPATIENT)
Dept: SURGERY | Facility: CLINIC | Age: 72
End: 2017-07-07

## 2017-07-07 VITALS
TEMPERATURE: 98.5 F | DIASTOLIC BLOOD PRESSURE: 70 MMHG | OXYGEN SATURATION: 99 % | BODY MASS INDEX: 22.58 KG/M2 | HEART RATE: 80 BPM | SYSTOLIC BLOOD PRESSURE: 119 MMHG | HEIGHT: 60 IN | RESPIRATION RATE: 16 BRPM | WEIGHT: 115 LBS

## 2017-07-07 DIAGNOSIS — Z48.89 POSTOPERATIVE VISIT: Primary | ICD-10-CM

## 2017-07-07 PROCEDURE — 99024 POSTOP FOLLOW-UP VISIT: CPT | Performed by: SURGERY

## 2017-07-07 NOTE — PROGRESS NOTES
"Patient: Prisca Ellis    YOB: 1945    Date: 07/07/2017    Primary Care Provider: Alva Bear MD    Reason for Consultation: post op visit for small bowel onstruction    Chief Complaint:   Chief Complaint   Patient presents with   • Post-op Follow-up     bowel obstruction       History: Patient was admitted to [facility] for a small bowel obstruction and an internal hernia. She had a lap exploratory with lysis of adhesions and a reduction of hernia. Patient states she is feeling better. She is still sore in her abdomen and has been sleeping on her back and side.     Review of Systems   Constitutional: Negative for chills, fever and unexpected weight change.   HENT: Negative for hearing loss, trouble swallowing and voice change.    Eyes: Negative for visual disturbance.   Respiratory: Negative for apnea, cough, chest tightness, shortness of breath and wheezing.    Cardiovascular: Negative for chest pain, palpitations and leg swelling.   Gastrointestinal: Positive for abdominal pain. Negative for abdominal distention, anal bleeding, blood in stool, constipation, diarrhea, nausea, rectal pain and vomiting.   Endocrine: Negative for cold intolerance and heat intolerance.   Genitourinary: Negative for difficulty urinating, dysuria and flank pain.   Musculoskeletal: Negative for back pain and gait problem.   Skin: Negative for color change, rash and wound.   Neurological: Negative for dizziness, syncope, speech difficulty, weakness, light-headedness, numbness and headaches.   Hematological: Negative for adenopathy. Does not bruise/bleed easily.   Psychiatric/Behavioral: Negative for confusion. The patient is not nervous/anxious.        Vital Signs  /70  Pulse 80  Temp 98.5 °F (36.9 °C) (Temporal Artery )   Resp 16  Ht 60\" (152.4 cm)  Wt 115 lb (52.2 kg)  SpO2 99%  BMI 22.46 kg/m2    Allergies:  Allergies   Allergen Reactions   • Sulfa Antibiotics        Medications:  No current " outpatient prescriptions on file.    Physical Exam:   General Appearance:    Alert, cooperative, in no acute distress   Head:    Normocephalic, without obvious abnormality, atraumatic   Lungs:     Clear to auscultation,respirations regular, even and                  unlabored    Heart:    Regular rhythm and normal rate, normal S1 and S2, no            murmur, no gallop, no rub, no click   Abdomen:     Normal bowel sounds, no masses, no organomegaly, soft        non-tender, non-distended, no guarding, no rebound                tenderness   Extremities:   Moves all extremities well, no edema, no cyanosis, no             redness   Pulses:   Pulses palpable and equal bilaterally   Skin:   No bleeding, bruising or rash      Assessment/Plan     1. Postoperative visit      Patient is to stay on limited activity for 5 more weeks.  Follow-up as needed.    Electronically signed by Sami Griffiths MD  07/07/17

## 2020-01-22 ENCOUNTER — APPOINTMENT (OUTPATIENT)
Dept: CT IMAGING | Facility: HOSPITAL | Age: 75
End: 2020-01-22

## 2020-01-22 ENCOUNTER — HOSPITAL ENCOUNTER (EMERGENCY)
Facility: HOSPITAL | Age: 75
Discharge: HOME | End: 2020-01-22
Attending: EMERGENCY MEDICINE | Admitting: EMERGENCY MEDICINE

## 2020-01-22 VITALS
HEIGHT: 60 IN | RESPIRATION RATE: 15 BRPM | BODY MASS INDEX: 21.6 KG/M2 | OXYGEN SATURATION: 99 % | TEMPERATURE: 98 F | DIASTOLIC BLOOD PRESSURE: 78 MMHG | HEART RATE: 78 BPM | SYSTOLIC BLOOD PRESSURE: 138 MMHG | WEIGHT: 110 LBS

## 2020-01-22 DIAGNOSIS — S39.012A BACK STRAIN, INITIAL ENCOUNTER: ICD-10-CM
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
## 2020-01-22 DIAGNOSIS — M53.9 MULTILEVEL DEGENERATIVE DISC DISEASE: ICD-10-CM

## 2020-01-22 PROCEDURE — 72128 CT CHEST SPINE W/O DYE: CPT

## 2020-01-22 PROCEDURE — 72125 CT NECK SPINE W/O DYE: CPT

## 2020-01-22 PROCEDURE — 99283 EMERGENCY DEPT VISIT LOW MDM: CPT

## 2020-01-22 PROCEDURE — 72131 CT LUMBAR SPINE W/O DYE: CPT

## 2020-01-22 RX ORDER — METHOCARBAMOL 500 MG/1
500 TABLET, FILM COATED ORAL 4 TIMES DAILY PRN
Qty: 8 TABLET | Refills: 0 | Status: SHIPPED | OUTPATIENT
Start: 2020-01-22 | End: 2020-01-24

## 2020-01-22 RX ORDER — ACETAMINOPHEN 500 MG
1000 TABLET ORAL ONCE
Status: COMPLETED | OUTPATIENT
Start: 2020-01-22 | End: 2020-01-22

## 2020-01-22 RX ADMIN — ACETAMINOPHEN 1000 MG: 500 TABLET, FILM COATED ORAL at 16:20
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
## 2020-01-22 NOTE — DISCHARGE INSTRUCTIONS
You will likely be sore for the next few days.  Take all home medications as directed.  Take Tylenol 500 mg every 6 hours.  May take Robaxin as needed for muscle strain as directed.  This medication may make you sleepy so do not drive while taking the medication.  May apply warm compresses, icy hot, massage the areas to help with pain.  Follow-up with your PCP as scheduled.  Return to the ER for any change, worsening symptoms, or any additional concerns.

## 2020-04-12 ENCOUNTER — APPOINTMENT (OUTPATIENT)
Dept: GENERAL RADIOLOGY | Facility: HOSPITAL | Age: 75
End: 2020-04-12

## 2020-04-12 ENCOUNTER — ANESTHESIA (OUTPATIENT)
Dept: PERIOP | Facility: HOSPITAL | Age: 75
End: 2020-04-12

## 2020-04-12 ENCOUNTER — HOSPITAL ENCOUNTER (INPATIENT)
Facility: HOSPITAL | Age: 75
LOS: 4 days | Discharge: NURSING, REHAB OR LONG-TERM CARE | End: 2020-04-16
Attending: EMERGENCY MEDICINE | Admitting: INTERNAL MEDICINE

## 2020-04-12 ENCOUNTER — ANESTHESIA EVENT (OUTPATIENT)
Dept: PERIOP | Facility: HOSPITAL | Age: 75
End: 2020-04-12

## 2020-04-12 DIAGNOSIS — S72.001A CLOSED FRACTURE OF RIGHT HIP, INITIAL ENCOUNTER (HCC): ICD-10-CM

## 2020-04-12 DIAGNOSIS — Z78.9 IMPAIRED MOBILITY AND ADLS: ICD-10-CM

## 2020-04-12 DIAGNOSIS — Z74.09 IMPAIRED MOBILITY AND ADLS: ICD-10-CM

## 2020-04-12 PROBLEM — M80.80XA OSTEOPOROSIS WITH FRACTURE: Status: ACTIVE | Noted: 2020-04-12

## 2020-04-12 PROBLEM — K21.9 GERD WITHOUT ESOPHAGITIS: Status: ACTIVE | Noted: 2020-04-12

## 2020-04-12 PROBLEM — R00.0 TACHYCARDIA: Status: ACTIVE | Noted: 2020-04-12

## 2020-04-12 LAB
ABO GROUP BLD: NORMAL
ABO GROUP BLD: NORMAL
ANION GAP SERPL CALCULATED.3IONS-SCNC: 12 MMOL/L (ref 5–15)
BASOPHILS # BLD AUTO: 0.04 10*3/MM3 (ref 0–0.2)
BASOPHILS NFR BLD AUTO: 0.8 % (ref 0–1.5)
BLD GP AB SCN SERPL QL: POSITIVE
BUN BLD-MCNC: 10 MG/DL (ref 8–23)
BUN/CREAT SERPL: 10.8 (ref 7–25)
CALCIUM SPEC-SCNC: 10 MG/DL (ref 8.6–10.5)
CHLORIDE SERPL-SCNC: 102 MMOL/L (ref 98–107)
CO2 SERPL-SCNC: 27 MMOL/L (ref 22–29)
COLD SCREEN: POSITIVE
CREAT BLD-MCNC: 0.93 MG/DL (ref 0.57–1)
DEPRECATED RDW RBC AUTO: 41.4 FL (ref 37–54)
EOSINOPHIL # BLD AUTO: 0.07 10*3/MM3 (ref 0–0.4)
EOSINOPHIL NFR BLD AUTO: 1.4 % (ref 0.3–6.2)
ERYTHROCYTE [DISTWIDTH] IN BLOOD BY AUTOMATED COUNT: 12.3 % (ref 12.3–15.4)
GFR SERPL CREATININE-BSD FRML MDRD: 59 ML/MIN/1.73
GLUCOSE BLD-MCNC: 114 MG/DL (ref 65–99)
HCT VFR BLD AUTO: 36.9 % (ref 34–46.6)
HGB BLD-MCNC: 12 G/DL (ref 12–15.9)
IMM GRANULOCYTES # BLD AUTO: 0.01 10*3/MM3 (ref 0–0.05)
IMM GRANULOCYTES NFR BLD AUTO: 0.2 % (ref 0–0.5)
INR PPP: 0.99 (ref 0.85–1.16)
LYMPHOCYTES # BLD AUTO: 1.1 10*3/MM3 (ref 0.7–3.1)
LYMPHOCYTES NFR BLD AUTO: 22 % (ref 19.6–45.3)
MCH RBC QN AUTO: 30 PG (ref 26.6–33)
MCHC RBC AUTO-ENTMCNC: 32.5 G/DL (ref 31.5–35.7)
MCV RBC AUTO: 92.3 FL (ref 79–97)
MONOCYTES # BLD AUTO: 0.34 10*3/MM3 (ref 0.1–0.9)
MONOCYTES NFR BLD AUTO: 6.8 % (ref 5–12)
NEUTROPHILS # BLD AUTO: 3.43 10*3/MM3 (ref 1.7–7)
NEUTROPHILS NFR BLD AUTO: 68.8 % (ref 42.7–76)
NONSPECIFIC COLD ANTIBODY: NORMAL
NRBC BLD AUTO-RTO: 0 /100 WBC (ref 0–0.2)
PLATELET # BLD AUTO: 323 10*3/MM3 (ref 140–450)
PMV BLD AUTO: 9.8 FL (ref 6–12)
POTASSIUM BLD-SCNC: 3.9 MMOL/L (ref 3.5–5.2)
PROTHROMBIN TIME: 12.8 SECONDS (ref 11.5–14)
RBC # BLD AUTO: 4 10*6/MM3 (ref 3.77–5.28)
RH BLD: POSITIVE
RH BLD: POSITIVE
SODIUM BLD-SCNC: 141 MMOL/L (ref 136–145)
T&S EXPIRATION DATE: NORMAL
WBC NRBC COR # BLD: 4.99 10*3/MM3 (ref 3.4–10.8)

## 2020-04-12 PROCEDURE — 25010000002 PHENYLEPHRINE PER 1 ML: Performed by: NURSE ANESTHETIST, CERTIFIED REGISTERED

## 2020-04-12 PROCEDURE — 25010000002 ONDANSETRON PER 1 MG: Performed by: ANESTHESIOLOGY

## 2020-04-12 PROCEDURE — 86870 RBC ANTIBODY IDENTIFICATION: CPT | Performed by: EMERGENCY MEDICINE

## 2020-04-12 PROCEDURE — 86900 BLOOD TYPING SEROLOGIC ABO: CPT | Performed by: EMERGENCY MEDICINE

## 2020-04-12 PROCEDURE — 85025 COMPLETE CBC W/AUTO DIFF WBC: CPT | Performed by: EMERGENCY MEDICINE

## 2020-04-12 PROCEDURE — C1769 GUIDE WIRE: HCPCS | Performed by: ORTHOPAEDIC SURGERY

## 2020-04-12 PROCEDURE — 25810000003 SODIUM CHLORIDE 0.9 % WITH KCL 20 MEQ 20-0.9 MEQ/L-% SOLUTION: Performed by: INTERNAL MEDICINE

## 2020-04-12 PROCEDURE — 25010000002 DEXAMETHASONE PER 1 MG: Performed by: NURSE ANESTHETIST, CERTIFIED REGISTERED

## 2020-04-12 PROCEDURE — 73502 X-RAY EXAM HIP UNI 2-3 VIEWS: CPT

## 2020-04-12 PROCEDURE — 0QS606Z REPOSITION RIGHT UPPER FEMUR WITH INTRAMEDULLARY INTERNAL FIXATION DEVICE, OPEN APPROACH: ICD-10-PCS | Performed by: ORTHOPAEDIC SURGERY

## 2020-04-12 PROCEDURE — 86901 BLOOD TYPING SEROLOGIC RH(D): CPT | Performed by: EMERGENCY MEDICINE

## 2020-04-12 PROCEDURE — 86901 BLOOD TYPING SEROLOGIC RH(D): CPT

## 2020-04-12 PROCEDURE — 25010000002 PROPOFOL 10 MG/ML EMULSION: Performed by: NURSE ANESTHETIST, CERTIFIED REGISTERED

## 2020-04-12 PROCEDURE — 25010000002 HYDROMORPHONE PER 4 MG: Performed by: EMERGENCY MEDICINE

## 2020-04-12 PROCEDURE — 25010000002 ROPIVACAINE PER 1 MG: Performed by: NURSE ANESTHETIST, CERTIFIED REGISTERED

## 2020-04-12 PROCEDURE — 86900 BLOOD TYPING SEROLOGIC ABO: CPT

## 2020-04-12 PROCEDURE — 80048 BASIC METABOLIC PNL TOTAL CA: CPT | Performed by: EMERGENCY MEDICINE

## 2020-04-12 PROCEDURE — C1713 ANCHOR/SCREW BN/BN,TIS/BN: HCPCS | Performed by: ORTHOPAEDIC SURGERY

## 2020-04-12 PROCEDURE — 76000 FLUOROSCOPY <1 HR PHYS/QHP: CPT

## 2020-04-12 PROCEDURE — 25010000003 CEFAZOLIN IN DEXTROSE 2-4 GM/100ML-% SOLUTION: Performed by: NURSE ANESTHETIST, CERTIFIED REGISTERED

## 2020-04-12 PROCEDURE — 25010000002 HYDROMORPHONE PER 4 MG: Performed by: ORTHOPAEDIC SURGERY

## 2020-04-12 PROCEDURE — 86922 COMPATIBILITY TEST ANTIGLOB: CPT

## 2020-04-12 PROCEDURE — 85610 PROTHROMBIN TIME: CPT | Performed by: EMERGENCY MEDICINE

## 2020-04-12 PROCEDURE — 25010000002 FENTANYL CITRATE (PF) 100 MCG/2ML SOLUTION: Performed by: EMERGENCY MEDICINE

## 2020-04-12 PROCEDURE — 25010000002 NEOSTIGMINE 10 MG/10ML SOLUTION: Performed by: ANESTHESIOLOGY

## 2020-04-12 PROCEDURE — 71045 X-RAY EXAM CHEST 1 VIEW: CPT

## 2020-04-12 PROCEDURE — 86850 RBC ANTIBODY SCREEN: CPT | Performed by: EMERGENCY MEDICINE

## 2020-04-12 PROCEDURE — 86920 COMPATIBILITY TEST SPIN: CPT

## 2020-04-12 PROCEDURE — 25010000002 FENTANYL CITRATE (PF) 100 MCG/2ML SOLUTION: Performed by: ANESTHESIOLOGY

## 2020-04-12 PROCEDURE — 99222 1ST HOSP IP/OBS MODERATE 55: CPT | Performed by: INTERNAL MEDICINE

## 2020-04-12 PROCEDURE — 99285 EMERGENCY DEPT VISIT HI MDM: CPT

## 2020-04-12 DEVICE — NAIL FEM TFN ADV PROX 125D SHT 10X170MM STRL: Type: IMPLANTABLE DEVICE | Status: FUNCTIONAL

## 2020-04-12 DEVICE — BLD FEM FIX HELI TFN ADV PERF 95MM STRL: Type: IMPLANTABLE DEVICE | Site: TROCHANTER | Status: FUNCTIONAL

## 2020-04-12 DEVICE — SCRW LK STRDRV TI 5X32MM STRL: Type: IMPLANTABLE DEVICE | Site: TROCHANTER | Status: FUNCTIONAL

## 2020-04-12 RX ORDER — PANTOPRAZOLE SODIUM 40 MG/1
40 TABLET, DELAYED RELEASE ORAL DAILY
COMMUNITY
End: 2023-03-08

## 2020-04-12 RX ORDER — DEXAMETHASONE SODIUM PHOSPHATE 4 MG/ML
INJECTION, SOLUTION INTRA-ARTICULAR; INTRALESIONAL; INTRAMUSCULAR; INTRAVENOUS; SOFT TISSUE AS NEEDED
Status: DISCONTINUED | OUTPATIENT
Start: 2020-04-12 | End: 2020-04-12 | Stop reason: SURG

## 2020-04-12 RX ORDER — SODIUM CHLORIDE AND POTASSIUM CHLORIDE 150; 900 MG/100ML; MG/100ML
100 INJECTION, SOLUTION INTRAVENOUS CONTINUOUS
Status: DISCONTINUED | OUTPATIENT
Start: 2020-04-12 | End: 2020-04-16 | Stop reason: HOSPADM

## 2020-04-12 RX ORDER — ONDANSETRON 2 MG/ML
4 INJECTION INTRAMUSCULAR; INTRAVENOUS ONCE AS NEEDED
Status: DISCONTINUED | OUTPATIENT
Start: 2020-04-12 | End: 2020-04-12 | Stop reason: HOSPADM

## 2020-04-12 RX ORDER — ROPIVACAINE HYDROCHLORIDE 2 MG/ML
INJECTION, SOLUTION EPIDURAL; INFILTRATION; PERINEURAL
Status: COMPLETED | OUTPATIENT
Start: 2020-04-12 | End: 2020-04-12

## 2020-04-12 RX ORDER — LIDOCAINE HYDROCHLORIDE 10 MG/ML
INJECTION, SOLUTION EPIDURAL; INFILTRATION; INTRACAUDAL; PERINEURAL AS NEEDED
Status: DISCONTINUED | OUTPATIENT
Start: 2020-04-12 | End: 2020-04-12 | Stop reason: SURG

## 2020-04-12 RX ORDER — ONDANSETRON 2 MG/ML
INJECTION INTRAMUSCULAR; INTRAVENOUS AS NEEDED
Status: DISCONTINUED | OUTPATIENT
Start: 2020-04-12 | End: 2020-04-12 | Stop reason: SURG

## 2020-04-12 RX ORDER — PANTOPRAZOLE SODIUM 40 MG/1
40 TABLET, DELAYED RELEASE ORAL DAILY
Status: DISCONTINUED | OUTPATIENT
Start: 2020-04-12 | End: 2020-04-16 | Stop reason: HOSPADM

## 2020-04-12 RX ORDER — CEFAZOLIN SODIUM 2 G/100ML
INJECTION, SOLUTION INTRAVENOUS AS NEEDED
Status: DISCONTINUED | OUTPATIENT
Start: 2020-04-12 | End: 2020-04-12 | Stop reason: SURG

## 2020-04-12 RX ORDER — NEOSTIGMINE METHYLSULFATE 1 MG/ML
INJECTION, SOLUTION INTRAVENOUS AS NEEDED
Status: DISCONTINUED | OUTPATIENT
Start: 2020-04-12 | End: 2020-04-12 | Stop reason: SURG

## 2020-04-12 RX ORDER — SODIUM CHLORIDE, SODIUM LACTATE, POTASSIUM CHLORIDE, CALCIUM CHLORIDE 600; 310; 30; 20 MG/100ML; MG/100ML; MG/100ML; MG/100ML
INJECTION, SOLUTION INTRAVENOUS CONTINUOUS PRN
Status: DISCONTINUED | OUTPATIENT
Start: 2020-04-12 | End: 2020-04-12 | Stop reason: SURG

## 2020-04-12 RX ORDER — LANOLIN ALCOHOL/MO/W.PET/CERES
1000 CREAM (GRAM) TOPICAL DAILY
Status: DISCONTINUED | OUTPATIENT
Start: 2020-04-12 | End: 2020-04-16 | Stop reason: HOSPADM

## 2020-04-12 RX ORDER — HYDROMORPHONE HYDROCHLORIDE 1 MG/ML
0.5 INJECTION, SOLUTION INTRAMUSCULAR; INTRAVENOUS; SUBCUTANEOUS ONCE
Status: COMPLETED | OUTPATIENT
Start: 2020-04-12 | End: 2020-04-12

## 2020-04-12 RX ORDER — FENTANYL CITRATE 50 UG/ML
INJECTION, SOLUTION INTRAMUSCULAR; INTRAVENOUS AS NEEDED
Status: DISCONTINUED | OUTPATIENT
Start: 2020-04-12 | End: 2020-04-12 | Stop reason: SURG

## 2020-04-12 RX ORDER — RISEDRONATE SODIUM 35 MG/1
35 TABLET, FILM COATED ORAL
COMMUNITY

## 2020-04-12 RX ORDER — SODIUM CHLORIDE 0.9 % (FLUSH) 0.9 %
10 SYRINGE (ML) INJECTION AS NEEDED
Status: DISCONTINUED | OUTPATIENT
Start: 2020-04-12 | End: 2020-04-16 | Stop reason: HOSPADM

## 2020-04-12 RX ORDER — ROPIVACAINE HYDROCHLORIDE 2 MG/ML
8 INJECTION, SOLUTION EPIDURAL; INFILTRATION CONTINUOUS
Status: DISCONTINUED | OUTPATIENT
Start: 2020-04-12 | End: 2020-04-16 | Stop reason: HOSPADM

## 2020-04-12 RX ORDER — ROCURONIUM BROMIDE 10 MG/ML
INJECTION, SOLUTION INTRAVENOUS AS NEEDED
Status: DISCONTINUED | OUTPATIENT
Start: 2020-04-12 | End: 2020-04-12 | Stop reason: SURG

## 2020-04-12 RX ORDER — HYDROMORPHONE HYDROCHLORIDE 1 MG/ML
0.5 INJECTION, SOLUTION INTRAMUSCULAR; INTRAVENOUS; SUBCUTANEOUS
Status: DISCONTINUED | OUTPATIENT
Start: 2020-04-12 | End: 2020-04-16 | Stop reason: HOSPADM

## 2020-04-12 RX ORDER — SODIUM CHLORIDE 0.9 % (FLUSH) 0.9 %
10 SYRINGE (ML) INJECTION EVERY 12 HOURS SCHEDULED
Status: DISCONTINUED | OUTPATIENT
Start: 2020-04-12 | End: 2020-04-16 | Stop reason: HOSPADM

## 2020-04-12 RX ORDER — ESMOLOL HYDROCHLORIDE 10 MG/ML
INJECTION INTRAVENOUS AS NEEDED
Status: DISCONTINUED | OUTPATIENT
Start: 2020-04-12 | End: 2020-04-12 | Stop reason: SURG

## 2020-04-12 RX ORDER — FENTANYL CITRATE 50 UG/ML
50 INJECTION, SOLUTION INTRAMUSCULAR; INTRAVENOUS
Status: DISCONTINUED | OUTPATIENT
Start: 2020-04-12 | End: 2020-04-12 | Stop reason: HOSPADM

## 2020-04-12 RX ORDER — PROPOFOL 10 MG/ML
VIAL (ML) INTRAVENOUS AS NEEDED
Status: DISCONTINUED | OUTPATIENT
Start: 2020-04-12 | End: 2020-04-12 | Stop reason: SURG

## 2020-04-12 RX ORDER — ACETAMINOPHEN 325 MG/1
650 TABLET ORAL EVERY 4 HOURS PRN
Status: DISCONTINUED | OUTPATIENT
Start: 2020-04-12 | End: 2020-04-16 | Stop reason: HOSPADM

## 2020-04-12 RX ORDER — ACETAMINOPHEN 160 MG/5ML
650 SOLUTION ORAL EVERY 4 HOURS PRN
Status: DISCONTINUED | OUTPATIENT
Start: 2020-04-12 | End: 2020-04-16 | Stop reason: HOSPADM

## 2020-04-12 RX ORDER — MAGNESIUM HYDROXIDE 1200 MG/15ML
LIQUID ORAL AS NEEDED
Status: DISCONTINUED | OUTPATIENT
Start: 2020-04-12 | End: 2020-04-12 | Stop reason: HOSPADM

## 2020-04-12 RX ORDER — ACETAMINOPHEN 650 MG/1
650 SUPPOSITORY RECTAL EVERY 4 HOURS PRN
Status: DISCONTINUED | OUTPATIENT
Start: 2020-04-12 | End: 2020-04-16 | Stop reason: HOSPADM

## 2020-04-12 RX ORDER — ONDANSETRON 2 MG/ML
4 INJECTION INTRAMUSCULAR; INTRAVENOUS EVERY 6 HOURS PRN
Status: DISCONTINUED | OUTPATIENT
Start: 2020-04-12 | End: 2020-04-16 | Stop reason: HOSPADM

## 2020-04-12 RX ORDER — LANOLIN ALCOHOL/MO/W.PET/CERES
1000 CREAM (GRAM) TOPICAL DAILY
COMMUNITY

## 2020-04-12 RX ORDER — FENTANYL CITRATE 50 UG/ML
50 INJECTION, SOLUTION INTRAMUSCULAR; INTRAVENOUS ONCE
Status: COMPLETED | OUTPATIENT
Start: 2020-04-12 | End: 2020-04-12

## 2020-04-12 RX ORDER — BUPIVACAINE HYDROCHLORIDE 2.5 MG/ML
30 INJECTION, SOLUTION EPIDURAL; INFILTRATION; INTRACAUDAL ONCE
Status: COMPLETED | OUTPATIENT
Start: 2020-04-12 | End: 2020-04-12

## 2020-04-12 RX ORDER — LIDOCAINE HYDROCHLORIDE AND EPINEPHRINE 10; 10 MG/ML; UG/ML
10 INJECTION, SOLUTION INFILTRATION; PERINEURAL ONCE
Status: COMPLETED | OUTPATIENT
Start: 2020-04-12 | End: 2020-04-12

## 2020-04-12 RX ORDER — CEFAZOLIN SODIUM 2 G/100ML
2 INJECTION, SOLUTION INTRAVENOUS EVERY 8 HOURS
Status: COMPLETED | OUTPATIENT
Start: 2020-04-13 | End: 2020-04-13

## 2020-04-12 RX ORDER — GLYCOPYRROLATE 0.2 MG/ML
INJECTION INTRAMUSCULAR; INTRAVENOUS AS NEEDED
Status: DISCONTINUED | OUTPATIENT
Start: 2020-04-12 | End: 2020-04-12 | Stop reason: SURG

## 2020-04-12 RX ADMIN — BUPIVACAINE HYDROCHLORIDE 30 ML: 2.5 INJECTION, SOLUTION EPIDURAL; INFILTRATION; INTRACAUDAL at 12:51

## 2020-04-12 RX ADMIN — PHENYLEPHRINE HYDROCHLORIDE 80 MCG: 10 INJECTION, SOLUTION INTRAMUSCULAR; INTRAVENOUS; SUBCUTANEOUS at 17:26

## 2020-04-12 RX ADMIN — PROPOFOL 90 MG: 10 INJECTION, EMULSION INTRAVENOUS at 16:21

## 2020-04-12 RX ADMIN — ESMOLOL HYDROCHLORIDE 10 MG: 10 INJECTION, SOLUTION INTRAVENOUS at 17:53

## 2020-04-12 RX ADMIN — PHENYLEPHRINE HYDROCHLORIDE 160 MCG: 10 INJECTION, SOLUTION INTRAMUSCULAR; INTRAVENOUS; SUBCUTANEOUS at 16:31

## 2020-04-12 RX ADMIN — ROPIVACAINE HYDROCHLORIDE 8 ML/HR: 2 INJECTION, SOLUTION EPIDURAL; INFILTRATION at 18:34

## 2020-04-12 RX ADMIN — TRANEXAMIC ACID 1000 MG: 100 INJECTION, SOLUTION INTRAVENOUS at 17:51

## 2020-04-12 RX ADMIN — PHENYLEPHRINE HYDROCHLORIDE 80 MCG: 10 INJECTION, SOLUTION INTRAMUSCULAR; INTRAVENOUS; SUBCUTANEOUS at 16:28

## 2020-04-12 RX ADMIN — LIDOCAINE HYDROCHLORIDE 50 MG: 10 INJECTION, SOLUTION EPIDURAL; INFILTRATION; INTRACAUDAL; PERINEURAL at 16:21

## 2020-04-12 RX ADMIN — NEOSTIGMINE 4 MG: 1 INJECTION INTRAVENOUS at 17:51

## 2020-04-12 RX ADMIN — ESMOLOL HYDROCHLORIDE 30 MG: 10 INJECTION, SOLUTION INTRAVENOUS at 16:21

## 2020-04-12 RX ADMIN — ROPIVACAINE HYDROCHLORIDE 20 ML: 2 INJECTION, SOLUTION EPIDURAL; INFILTRATION at 14:15

## 2020-04-12 RX ADMIN — FENTANYL CITRATE 25 MCG: 50 INJECTION, SOLUTION INTRAMUSCULAR; INTRAVENOUS at 17:14

## 2020-04-12 RX ADMIN — PHENYLEPHRINE HYDROCHLORIDE 80 MCG: 10 INJECTION, SOLUTION INTRAMUSCULAR; INTRAVENOUS; SUBCUTANEOUS at 17:21

## 2020-04-12 RX ADMIN — ONDANSETRON 4 MG: 2 INJECTION INTRAMUSCULAR; INTRAVENOUS at 17:43

## 2020-04-12 RX ADMIN — ESMOLOL HYDROCHLORIDE 20 MG: 10 INJECTION, SOLUTION INTRAVENOUS at 17:52

## 2020-04-12 RX ADMIN — CEFAZOLIN SODIUM 2 G: 2 INJECTION, SOLUTION INTRAVENOUS at 16:25

## 2020-04-12 RX ADMIN — SODIUM CHLORIDE, POTASSIUM CHLORIDE, SODIUM LACTATE AND CALCIUM CHLORIDE: 600; 310; 30; 20 INJECTION, SOLUTION INTRAVENOUS at 16:30

## 2020-04-12 RX ADMIN — FENTANYL CITRATE 25 MCG: 50 INJECTION, SOLUTION INTRAMUSCULAR; INTRAVENOUS at 17:42

## 2020-04-12 RX ADMIN — EPHEDRINE SULFATE 5 MG: 50 INJECTION INTRAMUSCULAR; INTRAVENOUS; SUBCUTANEOUS at 17:07

## 2020-04-12 RX ADMIN — PANTOPRAZOLE SODIUM 40 MG: 40 TABLET, DELAYED RELEASE ORAL at 21:46

## 2020-04-12 RX ADMIN — GLYCOPYRROLATE 0.6 MG: 0.2 INJECTION INTRAMUSCULAR; INTRAVENOUS at 17:51

## 2020-04-12 RX ADMIN — DEXAMETHASONE SODIUM PHOSPHATE 8 MG: 4 INJECTION, SOLUTION INTRAMUSCULAR; INTRAVENOUS at 16:25

## 2020-04-12 RX ADMIN — CYANOCOBALAMIN TAB 1000 MCG 1000 MCG: 1000 TAB at 21:46

## 2020-04-12 RX ADMIN — PHENYLEPHRINE HYDROCHLORIDE 1 MCG/KG/MIN: 10 INJECTION INTRAVENOUS at 16:38

## 2020-04-12 RX ADMIN — HYDROMORPHONE HYDROCHLORIDE 0.5 MG: 1 INJECTION, SOLUTION INTRAMUSCULAR; INTRAVENOUS; SUBCUTANEOUS at 19:44

## 2020-04-12 RX ADMIN — PHENYLEPHRINE HYDROCHLORIDE 160 MCG: 10 INJECTION, SOLUTION INTRAMUSCULAR; INTRAVENOUS; SUBCUTANEOUS at 16:36

## 2020-04-12 RX ADMIN — LIDOCAINE HYDROCHLORIDE,EPINEPHRINE BITARTRATE 10 ML: 10; .01 INJECTION, SOLUTION INFILTRATION; PERINEURAL at 12:51

## 2020-04-12 RX ADMIN — TRANEXAMIC ACID 1000 MG: 100 INJECTION, SOLUTION INTRAVENOUS at 16:40

## 2020-04-12 RX ADMIN — HYDROMORPHONE HYDROCHLORIDE 0.5 MG: 1 INJECTION, SOLUTION INTRAMUSCULAR; INTRAVENOUS; SUBCUTANEOUS at 11:05

## 2020-04-12 RX ADMIN — POTASSIUM CHLORIDE AND SODIUM CHLORIDE 100 ML/HR: 900; 150 INJECTION, SOLUTION INTRAVENOUS at 19:30

## 2020-04-12 RX ADMIN — FENTANYL CITRATE 50 MCG: 50 INJECTION INTRAMUSCULAR; INTRAVENOUS at 13:29

## 2020-04-12 RX ADMIN — FENTANYL CITRATE 50 MCG: 50 INJECTION, SOLUTION INTRAMUSCULAR; INTRAVENOUS at 18:10

## 2020-04-12 RX ADMIN — SODIUM CHLORIDE, POTASSIUM CHLORIDE, SODIUM LACTATE AND CALCIUM CHLORIDE: 600; 310; 30; 20 INJECTION, SOLUTION INTRAVENOUS at 16:16

## 2020-04-12 RX ADMIN — ROCURONIUM BROMIDE 50 MG: 10 SOLUTION INTRAVENOUS at 16:21

## 2020-04-12 NOTE — ANESTHESIA PROCEDURE NOTES
Airway  Urgency: elective    Date/Time: 4/12/2020 4:22 PM  Airway not difficult    General Information and Staff    Patient location during procedure: OR  CRNA: Angel Crystal CRNA    Indications and Patient Condition  Indications for airway management: airway protection    Preoxygenated: yes  MILS not maintained throughout  Mask difficulty assessment: 0 - not attempted    Final Airway Details  Final airway type: endotracheal airway      Successful airway: ETT  Cuffed: yes   Successful intubation technique: direct laryngoscopy and RSI  Facilitating devices/methods: Bougie and cricoid pressure  Endotracheal tube insertion site: oral  Blade: New  Blade size: 2  ETT size (mm): 7.0  Cormack-Lehane Classification: grade IIa - partial view of glottis  Placement verified by: chest auscultation and capnometry   Measured from: lips  ETT/EBT  to lips (cm): 20  Number of attempts at approach: 1  Assessment: lips, teeth, and gum same as pre-op and atraumatic intubation    Additional Comments  Negative epigastric sounds, Breath sound equal bilaterally with symmetric chest rise and fall. Atraumatic, dentition and mucosa unchanged

## 2020-04-12 NOTE — CONSULTS
Orthopedic Consult      Patient: Prisca Ellis    Date of Admission: 4/12/2020 10:55 AM    YOB: 1945    Medical Record Number: [id number]    Attending Physician: Arlen Stafford MD    Consulting Physician: Kash Akins Jr., MD      Chief Complaints: Closed fracture of right hip, initial encounter (CMS/[facility]) [S72.001A]      History of Present Illness: 75 y.o. female admitted to [facility] with Closed fracture of right hip, initial encounter (CMS/[facility]) [S72.001A]. I was consulted for further evaluation and treatment of right intertrochanteric hip fracture. Onset of symptoms was abrupt starting a few hours ago.  Symptoms are associated with right hip pain.  Symptoms are aggravated by motion of RLE.   Symptoms improve with rest. 74 yo F community ambulator who presents after tripping and falling onto RLE with resultant R IT femur fracture. She denies any other areas of pain. Denies and motor deficits/paresthesias.        Allergies   Allergen Reactions   • Sulfa Antibiotics         Home Medications:  Medications Prior to Admission   Medication Sig Dispense Refill Last Dose   • Multiple Vitamins-Minerals (MULTIVITAMIN PO) Take 1 tablet by mouth Daily.      • pantoprazole (PROTONIX) 40 MG EC tablet Take 40 mg by mouth Daily.      • risedronate (Actonel) 35 MG tablet Take 35 mg by mouth Every 7 (Seven) Days. with water on empty stomach, nothing by mouth or lie down for next 30 minutes. On Fridays.      • vitamin B-12 (CYANOCOBALAMIN) 1000 MCG tablet Take 1,000 mcg by mouth Daily.            Past Medical History:   Diagnosis Date   • Osteoporosis    • Ulcer         Past Surgical History:   Procedure Laterality Date   • COLONOSCOPY     • EXPLORATORY LAPAROTOMY N/A 6/26/2017    Procedure: LAPAROTOMY EXPLORATORY WITH LYSIS OF ADHESIONS; REDUCTION OF HERNIA  ;  Surgeon: Sami Griffiths MD;  Location: [facility] OR;  Service:    • TUBAL ABDOMINAL LIGATION          Social History     Occupational History    • Not on file   Tobacco Use   • Smoking status: Never Smoker   Substance and Sexual Activity   • Alcohol use: No   • Drug use: No   • Sexual activity: Defer      Social History     Social History Narrative   • Not on file      History reviewed. No pertinent family history.      Review of Systems:   HEENT: Patient denies any headaches, vision changes, change in hearing, or tinnitus, Patient denies any rhinorrhea, epistaxis, sinus pain, mouth or dental problems, sore throat or hoarseness, or dysphagia  Pulmonary: Patient denies any cough, congestion, SOA, or wheezing  Cardiovascular: Patient denies any chest pain, dyspnea, palpitations, weakness, intolerance of exercise, varicosities, swelling of extremities, known murmur  Gastrointestinal:  Patient denies nausea, vomiting, diarrhea, constipation, loss  of appetite, change in appetite, dysphagia, gas, heartburn, melena, change in bowel habits, use of laxatives or other drugs to alter the function of the gastrointestinal tract.  Genital/Urinary: Patient denies dysuria, change in color of urine, change in frequency of urination, pain with urgency, incontinence, retention, or nocturia.  Musculoskeletal: Patient denies increased warmth; redness; or swelling of joints; limitation of function; deformity; crepitation: pain in a joint or an extremity, the neck, or the back, especially with movement.  Neurological: Patient denies dizziness, tremor, ataxia, difficulty in speaking, change in speech, paresthesia, loss of sensation, seizures, syncope, changes in memory.  Endocrine system: Patient denies tremors, palpitations, intolerance of heat or cold, polyuria, polydipsia, polyphagia, diaphoresis, exophthalmos, or goiter.  Psychological: Patient denies thoughts/plans or harming self or other; depression,  insomnia, night terrors, payam, memory loss, disorientation.  Skin: Patient denies any bruising, rashes, discoloration, pruritus, wounds, ulcers, decubiti, changes in the  hair or nails  Hematopoietic: Patient denies history of spontaneous or excessive bleeding, epistaxis, hematuria, melena, fatigue, enlarged or tender lymph nodes, pallor, history of anemia.    Physical Exam: 75 y.o. female  General Appearance:    Alert, cooperative, in no acute distress                   Vitals:    04/12/20 1252 04/12/20 1300 04/12/20 1320 04/12/20 1322   BP: 138/84 132/59 132/61    Pulse: 110 112  112   Resp:       Temp:       TempSrc:       SpO2: 95% 96%  92%   Weight:       Height:            Head:    Normocephalic, without obvious abnormality, atraumatic      Right Upper Extremity:  No obvious deformity, painless ROM shoulder, elbow, wrist, no joint instability, normal distal strength and sensation to light touch, skin intact without cyanosis, clubbing, edema; +2 radial pulse  Left Upper Extremity:  No obvious deformity, painless ROM shoulder, elbow, wrist, no joint instability, normal distal strength and sensation to light touch, skin intact without cyanosis, clubbing, edema; +2 radial pulse  Right Lower Extremity:  +R hip pain, compartments soft, normal distal strength and sensation to light touch, skin intact without cyanosis, clubbing, edema; +2 dorsalis pedis pulse  Left Lower Extremity:  No obvious deformity, painless ROM hip, knee, ankle, compartments soft, normal distal strength and sensation to light touch, skin intact without cyanosis, clubbing, edema; +2 dorsalis pedis pulse         Diagnostic Tests:    I have reviewed the labs, radiology results and diagnostic studies:AP pelvis demonstrates a displaced IT femur fracture with significant comminution    Results from last 7 days   Lab Units 04/12/20  1103   WBC 10*3/mm3 4.99   HEMOGLOBIN g/dL 12.0   PLATELETS 10*3/mm3 323     Results from last 7 days   Lab Units 04/12/20  1103   SODIUM mmol/L 141   POTASSIUM mmol/L 3.9   CO2 mmol/L 27.0   CREATININE mg/dL 0.93   GLUCOSE mg/dL 114*           Assessment:  Patient Active Problem List    Diagnosis   • Small bowel obstruction (CMS/HCC)   • Closed fracture of right hip (CMS/HCC)   • Tachycardia   • GERD without esophagitis   • Osteoporosis with fracture           Plan:  The patient voiced understanding of the risks, benefits, and alternative forms of treatment that were discussed and the patient consents to proceed with TFN IT femur fracture. The risks and benefits were discussed with the patient to include, but not limited to: bleeding, infection, nerve injury, failure of fixation, need for further procedures, loss of limb or life.  She understands her risks are elevated given her age and osteoporosis. She understands and wishes to proceed with surgical intervention.    NPO  Posted/consented/marked for TFN R IT Femur fx  To OR today             Kash Akins Jr., MD  04/12/20  14:11

## 2020-04-12 NOTE — H&P
[facility] Medicine Services  HISTORY AND PHYSICAL    Patient Name: Prisca Ellis  : 1945  MRN: [id number]  Primary Care Physician: Alva Bear MD  Date of admission: 2020      Subjective   Subjective     Chief Complaint:  Hip pain    HPI:  Prisca Ellis is a 75 y.o. female with history of osteoporosis and GERD presents after a fall at home. Patient states she was turning, bumped into the washer, and fell onto the floor, hurting her right hip. Denies history of frequent falls.    Review of Systems   Constitutional: Negative for chills, fever and unexpected weight change.   HENT: Negative.    Eyes: Negative.    Respiratory: Negative.  Negative for cough and shortness of breath.    Cardiovascular: Negative.  Negative for chest pain and palpitations.   Gastrointestinal: Negative.  Negative for abdominal pain, diarrhea, nausea and vomiting.   Endocrine: Negative.    Genitourinary: Negative.  Negative for dysuria.   Musculoskeletal: Positive for arthralgias.   Skin: Negative.    Allergic/Immunologic: Negative.    Neurological: Negative.  Negative for headaches.   Hematological: Negative.    Psychiatric/Behavioral: Negative.         All other systems reviewed and are negative.     Personal History     Past Medical History:   Diagnosis Date   • Osteoporosis    • Ulcer        Past Surgical History:   Procedure Laterality Date   • COLONOSCOPY     • EXPLORATORY LAPAROTOMY N/A 2017    Procedure: LAPAROTOMY EXPLORATORY WITH LYSIS OF ADHESIONS; REDUCTION OF HERNIA  ;  Surgeon: Sami Griffiths MD;  Location: [facility];  Service:    • TUBAL ABDOMINAL LIGATION         Family History: denies family history of heart disease, diabetes or cancer. Otherwise pertinent FHx was reviewed and unremarkable.     Social History:  reports that she has never smoked. She does not have any smokeless tobacco history on file. She reports that she does not drink alcohol or use drugs.  Social  History     Social History Narrative   • Not on file       Medications:  Include a PPI daily  Available home medication information reviewed.    (Not in a hospital admission)    Allergies   Allergen Reactions   • Sulfa Antibiotics        Objective   Objective     Vital Signs:   Temp:  [98.2 °F (36.8 °C)] 98.2 °F (36.8 °C)  Heart Rate:  [] 112  Resp:  [20] 20  BP: (132-158)/(59-84) 132/59        Physical Exam   Constitutional -thin female, non toxic but appears uncomfortable.  HEENT-NCAT, mucous membranes moist  CV-tachycardic, regular, no murmur  Resp-CTAB, no wheezes, rhonchi or rales  Abd-soft, non-tender, non-distended, normo active bowel sounds  Ext-right leg slightly shortened compared to left and externally rotated  Neuro-alert and oriented, speech clear, moves all extremities   Psych-normal affect   Skin- No rash on exposed UE or LE bilaterally      Results Reviewed:  I have personally reviewed current lab and radiology data.    Results from last 7 days   Lab Units 04/12/20  1103   WBC 10*3/mm3 4.99   HEMOGLOBIN g/dL 12.0   HEMATOCRIT % 36.9   PLATELETS 10*3/mm3 323   INR  0.99     Results from last 7 days   Lab Units 04/12/20  1103   SODIUM mmol/L 141   POTASSIUM mmol/L 3.9   CHLORIDE mmol/L 102   CO2 mmol/L 27.0   BUN mg/dL 10   CREATININE mg/dL 0.93   GLUCOSE mg/dL 114*   CALCIUM mg/dL 10.0     Estimated Creatinine Clearance: 42 mL/min (by C-G formula based on SCr of 0.93 mg/dL).  Brief Urine Lab Results     None        Imaging Results (Last 24 Hours)     Procedure Component Value Units Date/Time    XR Hip With or Without Pelvis 2 - 3 View Right [[id number]] Collected:  04/12/20 1239     Updated:  04/12/20 1240    Narrative:       EXAMINATION: XR HIP W OR WO PELVIS 2-3 VIEW RIGHT-     INDICATION: fall/pain; S72.001A-Fracture of unspecified part of neck of  right femur, initial encounter for closed fracture     COMPARISON: NONE     FINDINGS: There is an intertrochanteric fracture of the right hip  with  mild if any comminution, other than avulsion of the lesser trochanter.  Remaining bony structures appear osteopenic but intact.          Impression:       Acute intertrochanteric fracture of the right hip.           XR Chest 1 View [[id number]] Collected:  04/12/20 1236     Updated:  04/12/20 1240    Narrative:       EXAMINATION: XR CHEST 1 VW-     INDICATION: BROKEN HIP; S72.001A-Fracture of unspecified part of neck of  right femur, initial encounter for closed fracture     COMPARISON: 02/20/2017     FINDINGS: Heart and pulmonary vasculature appear normal in size but  lungs are well-expanded and appear clear. No edema effusion or  pneumothorax is seen.          Impression:       No evidence of active chest disease.                    Assessment/Plan   Assessment & Plan     Active Hospital Problems    Diagnosis POA   • Closed fracture of right hip (CMS/[facility]) [S72.001A] Yes       Right hip intertrochanteric fracture  - Orthopedics aware of admission, plan for surgical fixation today  - pain control    Tachycardia  - suspect secondary to pain  - check ekg  - IV fluid hydration    GERD  - PPI    Osteoporosis      DVT prophylaxis:  Mechanical pre-op    CODE STATUS:  Patient wishes to be FULL CODE  Code Status and Medical Interventions:   Ordered at: 04/12/20 1315     Level Of Support Discussed With:    Patient     Code Status:    CPR     Medical Interventions (Level of Support Prior to Arrest):    Full       Admission Status:  I believe this patient meets INPATIENT status due to acute hip fracture and need for urgent surgical intervention.  I feel patient’s risk for adverse outcomes and need for care warrant INPATIENT evaluation and I predict the patient’s care encounter to likely last beyond 2 midnights.    Electronically signed by Vinny Villa MD, 04/12/20, 1:16 PM.

## 2020-04-12 NOTE — ANESTHESIA PREPROCEDURE EVALUATION
Anesthesia Evaluation     Patient summary reviewed and Nursing notes reviewed   no history of anesthetic complications:  NPO Solid Status: > 8 hours  NPO Liquid Status: > 6 hours           Airway   Mallampati: III  TM distance: >3 FB  Neck ROM: full  no difficulty expected  Dental          Pulmonary - negative pulmonary ROS and normal exam   Cardiovascular   Exercise tolerance: excellent (>7 METS)    ECG reviewed  Rhythm: regular  Rate: normal        Neuro/Psych- negative ROS  GI/Hepatic/Renal/Endo    (+)  GERD, PUD,      Musculoskeletal     (+) joint swelling,   Abdominal    Substance History      OB/GYN          Other   arthritis,                      Anesthesia Plan    ASA 3     general with block     intravenous induction     Anesthetic plan, all risks, benefits, and alternatives have been provided, discussed and informed consent has been obtained with: patient.    Plan discussed with CRNA.

## 2020-04-12 NOTE — OP NOTE
HIP TROCANTERIC NAILING WITH INTRAMEDULLARY HIP SCREW  Procedure Report    Patient Name:  Prisca Ellis  YOB: 1945    Date of Surgery:  04/12/20       Indications:  74 yo female s/p fall with resultant right hip pain. Clinical and radiographic studies were consistent with a basicervical femoral neck fracture. The risks and benefits of open/closed reduction and placement of a trochanteric femoral nail were discussed with the patient and family members, who expressed their understanding and wished to proceed with the procedure.    Pre-op Diagnosis:     Right basicervical femoral neck fracture    Post-op Diagnosis:     same    Procedure/CPT® Codes: 73398    Procedure(s):  HIP TROCHANTERIC NAILING WITH INTRAMEDULLARY HIP SCREW    Staff:  Surgeon(s):  Kash Akins Jr., MD         Anesthesia: General with Block    Estimated Blood Loss: 150 mL    Implants:    Implant Name Type Inv. Item Serial No.  Lot No. LRB No. Used   NAIL FEM TFN ADV PROX 125D SHT [id number] STRL - [id number] Implant NAIL FEM TFN ADV PROX 125D SHT [id number] STRL  DEPUY SYNTHES [id number] Right 1   BLD FEM FIX REYNALDO TFN ADV PERF 95MM STRL - [id number] Implant BLD FEM FIX REYNALDO TFN ADV PERF 95MM STRL  DEPUY SYNTHES [id number] Right 1   SCRW LK STRDRV TI 5X32MM STRL - [id number] Implant SCRW LK STRDRV TI 5X32MM STRL  DEPUY SYNTHES [id number] Right 1       Specimen:                None  Specimen:                None      Findings: Displaced basicervical femoral neck fracture    Complications: none apparent    Description of Procedure: After informed consent had been obtained and the operative site had been marked, the patient was taken back to the operating suite, where a timeout was performed with the entire surgical staff present. The patient underwent general anesthesia and was intubated, then transferred to the [place] table. All bony prominences were well padded, a perineal post was placed, and the well leg was placed in a well leg  saleh. The fractured leg was then placed in a well-padded boot and attached to the [place] table. Appropriate gross traction was then placed to the adducted leg and slightly internally rotated leg. AP and lateral fluoroscopic views were then taken of the operative hip, demonstrating excellent reduction of the fracture. Using the fluoroscopic guides, we chose the appropriate diameter TFN and neck angle. We then prepped and draped the operative extremity in the usual sterile fashion.    We first began the operation by aligning the entry guidewire with our starting point, along the tip of the greater trochanter. A small stab incision was then made proximal to the greater trochanter. The starting guide wire was then passed percutaneously to the greater trochanter. The guide wire was aligned so as to travel down the central aspect of the femoral canal on both the AP and lateral fluoroscopic views. The guide wire was then passed through the tip of the greater trochanter down the central aspect of the femoral canal, just past the level of the lesser trochanter. The small stab incision was then enlarged using a #10 blade. The soft tissue protector was then placed over top of the entry guide wire, followed by placement of the entry reamer. The entry reamer was then passed over top of the guide wire, down the canal of the femur approximately 1-2 cm distal to the level of the lesser trochanter. The entry guide wire and reamer were then removed from the hip. The appropriate diameter nail was then passed down the canal of the femur and malleted to the depth that would allow the helical blade to pass up the central aspect of the femoral neck and into the central aspect of the femoral head.    We then attached the outrigger guide for the helical blade onto the proximal aspect of the nail. A 3 cm incision was then made through the skin and ITB at the appropriate site for placement of our helical blade guide.  Additionally, a separate  3 cm incision was made to place a percutaneous bone hook around the medial aspect of the femoral neck.  The guide was then attached and advanced to the lateral cortex of the femur and locked in place. We then ensured on both the lateral and AP views that the trajectory of the guide would allow our guide pin to pass to the central aspect of the femoral head.  We then applied traction to the medial aspect of the femoral neck to reduce the femoral neck anatomically.  We then passed our guide wire, ensuring placement into the central aspect of the head in both the AP and lateral views, so as to ensure a Tip-apex distance of <25 mm. After our guide wire had been passed to approximately 3 mm short of the articular surface at the central aspect of the femoral head, we placed our measuring device over top of the guide wire. We obtained a measurement of 100 mm, thus we chose to use a 95 mm helical blade. The helical blade was then passed over top of the guide wire and malleted into place. We ensured via fluoroscopic guidance that the helical blade stopped short of the articular surface, but that it obtained a TAD of <25 mm. After placing the helical blade, we then removed the guide wire. The blade was locked into the nail proximally after compressing the fracture through the nail.    We then placed 1 distal interlocking screw through the outrigger guide.    Final fluoroscopic views were taken, demonstrating excellent fracture reduction with a helical blade that had a TAD <25 mm.     The wounds were irrigated thoroughly. The subcutaneous tissue was closed with 2-0 Vicryl in an interrupted fashion. The skin was closed with a prineo dressing  Then patient was then awakened from anesthesia, extubated, and transferred to the [facility]. The patient was then transferred to the postanesthesia care unit in stable condition.    The plan for the patient will be to be weightbearing as tolerated right lower extremity. The patient  will have a postoperative appointment in approximately 2 weeks for a wound check.      Kash Akins MD  04/12/20 18:10

## 2020-04-12 NOTE — ANESTHESIA POSTPROCEDURE EVALUATION
Patient: Prisca Ellis    Procedure Summary     Date:  04/12/20 Room / Location:   RENU OR 11 /  RENU OR    Anesthesia Start:  1616 Anesthesia Stop:  1823    Procedure:  HIP TROCHANTERIC NAILING WITH INTRAMEDULLARY HIP SCREW (Right Hip) Diagnosis:      Surgeon:  Kash Akins Jr., MD Provider:  Rosalind Montero MD    Anesthesia Type:  general with block ASA Status:  3          Anesthesia Type: general with block    Vitals  No vitals data found for the desired time range.          Post Anesthesia Care and Evaluation    Patient location during evaluation: PACU  Patient participation: complete - patient participated  Level of consciousness: awake  Pain score: 0  Pain management: adequate  Airway patency: patent  Anesthetic complications: No anesthetic complications  PONV Status: none  Cardiovascular status: acceptable and hemodynamically stable  Respiratory status: acceptable, nasal cannula, nonlabored ventilation and spontaneous ventilation  Hydration status: acceptable    Comments: No apparent anesthesia complications noted at this time

## 2020-04-12 NOTE — ED PROVIDER NOTES
Subjective   75-year-old female presents for evaluation of right hip pain.  Approximately 1 hour prior to coming to the emergency department, the patient was at home when she had a mechanical trip and fall and slipped on a linoleum floor.  She landed awkwardly onto her right hip and was unable to bear weight after the fall secondary to pain.  Isolated injury.  No paresthesias.  She is not anticoagulated.  No neck pain.  No LOC.  No head injury.  Pain is currently 9 out of 10 in severity and worse with attempted movement.          Review of Systems   Musculoskeletal:        Right hip pain   Neurological: Negative for numbness.   All other systems reviewed and are negative.      Past Medical History:   Diagnosis Date   • Osteoporosis    • Ulcer        Allergies   Allergen Reactions   • Sulfa Antibiotics        Past Surgical History:   Procedure Laterality Date   • COLONOSCOPY     • EXPLORATORY LAPAROTOMY N/A 6/26/2017    Procedure: LAPAROTOMY EXPLORATORY WITH LYSIS OF ADHESIONS; REDUCTION OF HERNIA  ;  Surgeon: Sami Griffiths MD;  Location: [facility];  Service:    • TUBAL ABDOMINAL LIGATION         History reviewed. No pertinent family history.    Social History     Socioeconomic History   • Marital status:      Spouse name: Not on file   • Number of children: Not on file   • Years of education: Not on file   • Highest education level: Not on file   Tobacco Use   • Smoking status: Never Smoker   Substance and Sexual Activity   • Alcohol use: No   • Drug use: No   • Sexual activity: Defer           Objective   Physical Exam   Constitutional: She is oriented to person, place, and time. She appears well-developed and well-nourished.   Nontoxic-appearing elderly female   HENT:   Head: Normocephalic and atraumatic.   Mouth/Throat: Oropharynx is clear and moist.   Eyes: Pupils are equal, round, and reactive to light. EOM are normal.   Neck:   No midline cervical spine tenderness to palpation, no step-off or  deformity present   Cardiovascular: Normal rate, regular rhythm, normal heart sounds and intact distal pulses. Exam reveals no gallop and no friction rub.   No murmur heard.  Pulmonary/Chest: Effort normal and breath sounds normal. No respiratory distress. She has no wheezes. She has no rales.   Abdominal: Soft. Bowel sounds are normal. She exhibits no distension and no mass. There is no tenderness. There is no guarding.   Musculoskeletal:   Tenderness noted to lateral aspect of right hip, range of motion limited secondary to pain, no shortening or rotation noted to right lower extremity when compared to the left, no pelvic instability present   Neurological: She is alert and oriented to person, place, and time.   Right lower extremity is neurovascularly intact distally with bounding distal pulses and normal sensation   Skin: Skin is warm and dry. No rash noted. She is not diaphoretic. No erythema.   Psychiatric: She has a normal mood and affect. Judgment and thought content normal.   Nursing note and vitals reviewed.      Nerve Block  Date/Time: 4/12/2020 12:57 PM  Performed by: Tobias Schneider MD  Authorized by: Tobias Schneider MD     Consent:     Consent obtained:  Written and verbal    Consent given by:  Patient    Risks discussed:  Allergic reaction, bleeding, intravenous injection, infection, nerve damage, pain, unsuccessful block and swelling    Alternatives discussed:  No treatment and alternative treatment  Indications:     Indications:  Pain relief  Location:     Body area:  Lower extremity    Lower extremity nerve:  Femoral    Laterality:  Right  Pre-procedure details:     Skin preparation:  2% chlorhexidine    Preparation: Patient was prepped and draped in usual sterile fashion    Skin anesthesia (see MAR for exact dosages):     Skin anesthesia method:  Local infiltration    Local anesthetic:  Lidocaine 1% WITH epi  Procedure details (see MAR for exact dosages):     Guidance: ultrasound       Anesthetic injected:  Bupivacaine 0.25% w/o epi    Steroid injected:  None    Additive injected:  None    Injection procedure:  Anatomic landmarks identified, incremental injection, negative aspiration for blood, introduced needle and anatomic landmarks palpated    Paresthesia:  None  Post-procedure details:     Dressing:  Sterile dressing    Outcome:  Pain improved    Patient tolerance of procedure:  Tolerated well, no immediate complications               ED Course  ED Course as of Apr 12 1257   Sun Apr 12, 2020   1102 75-year-old female presents for evaluation of right hip pain following a mechanical trip and fall approximately 1 hour ago at home.  Isolated injury.  No LOC.  She is not anticoagulated.  She was unable to bear weight following the fall.  On arrival to the ED, patient nontoxic-appearing.  She is unable to range her right hip secondary to pain.  No shortening or rotation appreciated.  No pelvic instability.  Right lower extremity is neurovascularly intact distally.  We will obtain plain films, provide pain control, and we will reassess following initial interventions.    [DD]   1134 Labs unrevealing.    [DD]   1159 Plain films confirmed right intertrochanteric hip fracture.  I discussed the patient's case with Dr. Akins of orthopedics who recommended admission to the hospital and definitive surgical management later today.  He requested that we keep the patient n.p.o. in the interim.    [DD]   1207 We will proceed with a femoral nerve block for pain control prior to admission.    [DD]   1248 Femoral nerve block performed without complication.  The patient tolerated the procedure well.    [DD]   1256 I discussed the patient's case with Dr. Stafford, and the patient will be admitted under her care.    [DD]      ED Course User Index  [DD] Tobias Schneider MD                                 Recent Results (from the past 24 hour(s))   Basic Metabolic Panel    Collection Time: 04/12/20 11:03 AM    Result Value Ref Range    Glucose 114 (H) 65 - 99 mg/dL    BUN 10 8 - 23 mg/dL    Creatinine 0.93 0.57 - 1.00 mg/dL    Sodium 141 136 - 145 mmol/L    Potassium 3.9 3.5 - 5.2 mmol/L    Chloride 102 98 - 107 mmol/L    CO2 27.0 22.0 - 29.0 mmol/L    Calcium 10.0 8.6 - 10.5 mg/dL    eGFR Non African Amer 59 (L) >60 mL/min/1.73    BUN/Creatinine Ratio 10.8 7.0 - 25.0    Anion Gap 12.0 5.0 - 15.0 mmol/L   Protime-INR    Collection Time: 04/12/20 11:03 AM   Result Value Ref Range    Protime 12.8 11.5 - 14.0 Seconds    INR 0.99 0.85 - 1.16   CBC Auto Differential    Collection Time: 04/12/20 11:03 AM   Result Value Ref Range    WBC 4.99 3.40 - 10.80 10*3/mm3    RBC 4.00 3.77 - 5.28 10*6/mm3    Hemoglobin 12.0 12.0 - 15.9 g/dL    Hematocrit 36.9 34.0 - 46.6 %    MCV 92.3 79.0 - 97.0 fL    MCH 30.0 26.6 - 33.0 pg    MCHC 32.5 31.5 - 35.7 g/dL    RDW 12.3 12.3 - 15.4 %    RDW-SD 41.4 37.0 - 54.0 fl    MPV 9.8 6.0 - 12.0 fL    Platelets 323 140 - 450 10*3/mm3    Neutrophil % 68.8 42.7 - 76.0 %    Lymphocyte % 22.0 19.6 - 45.3 %    Monocyte % 6.8 5.0 - 12.0 %    Eosinophil % 1.4 0.3 - 6.2 %    Basophil % 0.8 0.0 - 1.5 %    Immature Grans % 0.2 0.0 - 0.5 %    Neutrophils, Absolute 3.43 1.70 - 7.00 10*3/mm3    Lymphocytes, Absolute 1.10 0.70 - 3.10 10*3/mm3    Monocytes, Absolute 0.34 0.10 - 0.90 10*3/mm3    Eosinophils, Absolute 0.07 0.00 - 0.40 10*3/mm3    Basophils, Absolute 0.04 0.00 - 0.20 10*3/mm3    Immature Grans, Absolute 0.01 0.00 - 0.05 10*3/mm3    nRBC 0.0 0.0 - 0.2 /100 WBC     Note: In addition to lab results from this visit, the labs listed above may include labs taken at another facility or during a different encounter within the last 24 hours. Please correlate lab times with ED admission and discharge times for further clarification of the services performed during this visit.    XR Chest 1 View   Preliminary Result   No evidence of active chest disease.               XR Hip With or Without Pelvis 2 - 3 View  Right   Preliminary Result   Acute intertrochanteric fracture of the right hip.                 Vitals:    04/12/20 1231 04/12/20 1242 04/12/20 1252 04/12/20 1252   BP: 137/70 146/70 135/77 138/84   Pulse: 107 102 107 110   Resp:       Temp:       TempSrc:       SpO2: 100% 100% 100% 95%   Weight:       Height:         Medications   HYDROmorphone (DILAUDID) injection 0.5 mg (0.5 mg Intravenous Given 4/12/20 1105)   bupivacaine (PF) (MARCAINE) 0.25 % injection 30 mL (30 mL Injection Given 4/12/20 1251)   lidocaine-EPINEPHrine (XYLOCAINE W/EPI) 1 %-1:[id number] injection 10 mL (10 mL Injection Given 4/12/20 1251)     ECG/EMG Results (last 24 hours)     ** No results found for the last 24 hours. **        No orders to display       Recent Results (from the past 24 hour(s))   Basic Metabolic Panel    Collection Time: 04/12/20 11:03 AM   Result Value Ref Range    Glucose 114 (H) 65 - 99 mg/dL    BUN 10 8 - 23 mg/dL    Creatinine 0.93 0.57 - 1.00 mg/dL    Sodium 141 136 - 145 mmol/L    Potassium 3.9 3.5 - 5.2 mmol/L    Chloride 102 98 - 107 mmol/L    CO2 27.0 22.0 - 29.0 mmol/L    Calcium 10.0 8.6 - 10.5 mg/dL    eGFR Non African Amer 59 (L) >60 mL/min/1.73    BUN/Creatinine Ratio 10.8 7.0 - 25.0    Anion Gap 12.0 5.0 - 15.0 mmol/L   Protime-INR    Collection Time: 04/12/20 11:03 AM   Result Value Ref Range    Protime 12.8 11.5 - 14.0 Seconds    INR 0.99 0.85 - 1.16   CBC Auto Differential    Collection Time: 04/12/20 11:03 AM   Result Value Ref Range    WBC 4.99 3.40 - 10.80 10*3/mm3    RBC 4.00 3.77 - 5.28 10*6/mm3    Hemoglobin 12.0 12.0 - 15.9 g/dL    Hematocrit 36.9 34.0 - 46.6 %    MCV 92.3 79.0 - 97.0 fL    MCH 30.0 26.6 - 33.0 pg    MCHC 32.5 31.5 - 35.7 g/dL    RDW 12.3 12.3 - 15.4 %    RDW-SD 41.4 37.0 - 54.0 fl    MPV 9.8 6.0 - 12.0 fL    Platelets 323 140 - 450 10*3/mm3    Neutrophil % 68.8 42.7 - 76.0 %    Lymphocyte % 22.0 19.6 - 45.3 %    Monocyte % 6.8 5.0 - 12.0 %    Eosinophil % 1.4 0.3 - 6.2 %    Basophil %  0.8 0.0 - 1.5 %    Immature Grans % 0.2 0.0 - 0.5 %    Neutrophils, Absolute 3.43 1.70 - 7.00 10*3/mm3    Lymphocytes, Absolute 1.10 0.70 - 3.10 10*3/mm3    Monocytes, Absolute 0.34 0.10 - 0.90 10*3/mm3    Eosinophils, Absolute 0.07 0.00 - 0.40 10*3/mm3    Basophils, Absolute 0.04 0.00 - 0.20 10*3/mm3    Immature Grans, Absolute 0.01 0.00 - 0.05 10*3/mm3    nRBC 0.0 0.0 - 0.2 /100 WBC     Note: In addition to lab results from this visit, the labs listed above may include labs taken at another facility or during a different encounter within the last 24 hours. Please correlate lab times with ED admission and discharge times for further clarification of the services performed during this visit.    XR Chest 1 View   Preliminary Result   No evidence of active chest disease.               XR Hip With or Without Pelvis 2 - 3 View Right   Preliminary Result   Acute intertrochanteric fracture of the right hip.                 Vitals:    04/12/20 1242 04/12/20 1252 04/12/20 1252 04/12/20 1300   BP: 146/70 135/77 138/84 132/59   Pulse: 102 107 110 112   Resp:       Temp:       TempSrc:       SpO2: 100% 100% 95% 96%   Weight:       Height:         Medications   fentaNYL citrate (PF) (SUBLIMAZE) injection 50 mcg (has no administration in time range)   sodium chloride 0.9 % with KCl 20 mEq/L infusion (has no administration in time range)   HYDROmorphone (DILAUDID) injection 0.5 mg (has no administration in time range)   ondansetron (ZOFRAN) injection 4 mg (has no administration in time range)   sodium chloride 0.9 % flush 10 mL (has no administration in time range)   sodium chloride 0.9 % flush 10 mL (has no administration in time range)   acetaminophen (TYLENOL) tablet 650 mg (has no administration in time range)     Or   acetaminophen (TYLENOL) 160 MG/5ML solution 650 mg (has no administration in time range)     Or   acetaminophen (TYLENOL) suppository 650 mg (has no administration in time range)   HYDROmorphone (DILAUDID)  injection 0.5 mg (0.5 mg Intravenous Given 4/12/20 1105)   bupivacaine (PF) (MARCAINE) 0.25 % injection 30 mL (30 mL Injection Given 4/12/20 1251)   lidocaine-EPINEPHrine (XYLOCAINE W/EPI) 1 %-1:295756 injection 10 mL (10 mL Injection Given 4/12/20 1251)     ECG/EMG Results (last 24 hours)     ** No results found for the last 24 hours. **        ECG 12 Lead    (Results Pending)                 MDM    Final diagnoses:   Closed fracture of right hip, initial encounter (CMS/[facility])            Tobias Schneider MD  04/12/20 4572

## 2020-04-13 LAB
ANION GAP SERPL CALCULATED.3IONS-SCNC: 9 MMOL/L (ref 5–15)
BUN BLD-MCNC: 11 MG/DL (ref 8–23)
BUN/CREAT SERPL: 12.5 (ref 7–25)
CALCIUM SPEC-SCNC: 8.4 MG/DL (ref 8.6–10.5)
CHLORIDE SERPL-SCNC: 104 MMOL/L (ref 98–107)
CK SERPL-CCNC: 398 U/L (ref 20–180)
CO2 SERPL-SCNC: 26 MMOL/L (ref 22–29)
CREAT BLD-MCNC: 0.88 MG/DL (ref 0.57–1)
DEPRECATED RDW RBC AUTO: 43.1 FL (ref 37–54)
ERYTHROCYTE [DISTWIDTH] IN BLOOD BY AUTOMATED COUNT: 12.4 % (ref 12.3–15.4)
GFR SERPL CREATININE-BSD FRML MDRD: 62 ML/MIN/1.73
GLUCOSE BLD-MCNC: 123 MG/DL (ref 65–99)
HCT VFR BLD AUTO: 24.4 % (ref 34–46.6)
HCT VFR BLD AUTO: 24.9 % (ref 34–46.6)
HCT VFR BLD AUTO: 27.3 % (ref 34–46.6)
HGB BLD-MCNC: 7.7 G/DL (ref 12–15.9)
HGB BLD-MCNC: 8 G/DL (ref 12–15.9)
HGB BLD-MCNC: 8.6 G/DL (ref 12–15.9)
MCH RBC QN AUTO: 30.1 PG (ref 26.6–33)
MCHC RBC AUTO-ENTMCNC: 31.6 G/DL (ref 31.5–35.7)
MCV RBC AUTO: 95.3 FL (ref 79–97)
PLATELET # BLD AUTO: 234 10*3/MM3 (ref 140–450)
PMV BLD AUTO: 10.2 FL (ref 6–12)
POTASSIUM BLD-SCNC: 4.3 MMOL/L (ref 3.5–5.2)
RBC # BLD AUTO: 2.56 10*6/MM3 (ref 3.77–5.28)
SODIUM BLD-SCNC: 139 MMOL/L (ref 136–145)
WBC NRBC COR # BLD: 6.68 10*3/MM3 (ref 3.4–10.8)

## 2020-04-13 PROCEDURE — 85018 HEMOGLOBIN: CPT | Performed by: INTERNAL MEDICINE

## 2020-04-13 PROCEDURE — 97161 PT EVAL LOW COMPLEX 20 MIN: CPT

## 2020-04-13 PROCEDURE — 99233 SBSQ HOSP IP/OBS HIGH 50: CPT | Performed by: INTERNAL MEDICINE

## 2020-04-13 PROCEDURE — 82550 ASSAY OF CK (CPK): CPT | Performed by: ORTHOPAEDIC SURGERY

## 2020-04-13 PROCEDURE — 97535 SELF CARE MNGMENT TRAINING: CPT

## 2020-04-13 PROCEDURE — 97116 GAIT TRAINING THERAPY: CPT

## 2020-04-13 PROCEDURE — 25010000003 CEFAZOLIN IN DEXTROSE 2-4 GM/100ML-% SOLUTION: Performed by: ORTHOPAEDIC SURGERY

## 2020-04-13 PROCEDURE — 80048 BASIC METABOLIC PNL TOTAL CA: CPT | Performed by: ORTHOPAEDIC SURGERY

## 2020-04-13 PROCEDURE — 85027 COMPLETE CBC AUTOMATED: CPT | Performed by: ORTHOPAEDIC SURGERY

## 2020-04-13 PROCEDURE — 85014 HEMATOCRIT: CPT | Performed by: INTERNAL MEDICINE

## 2020-04-13 PROCEDURE — 97166 OT EVAL MOD COMPLEX 45 MIN: CPT

## 2020-04-13 RX ADMIN — CEFAZOLIN SODIUM 2 G: 2 INJECTION, SOLUTION INTRAVENOUS at 09:22

## 2020-04-13 RX ADMIN — CYANOCOBALAMIN TAB 1000 MCG 1000 MCG: 1000 TAB at 09:18

## 2020-04-13 RX ADMIN — CEFAZOLIN SODIUM 2 G: 2 INJECTION, SOLUTION INTRAVENOUS at 00:36

## 2020-04-13 RX ADMIN — PANTOPRAZOLE SODIUM 40 MG: 40 TABLET, DELAYED RELEASE ORAL at 09:18

## 2020-04-13 NOTE — PROGRESS NOTES
[facility] Medicine Services  PROGRESS NOTE    Patient Name: Prisca Ellis  : 1945  MRN: [id number]    Date of Admission: 2020  Primary Care Physician: Alva Bear MD    Subjective   Subjective     CC:  Hip pain      HPI:  Resting in bed in no acute distress and overall comfortable.  Pain is well controlled and patient does not have any specific complaints.  No fever or chills.  No chest pain, palpitation, shortness of breath at rest.  No cough or coryza.  No nausea, vomiting, diarrhea, abdominal pain.  No headache    Review of Systems     Objective   Objective     Vital Signs:   Temp:  [97.2 °F (36.2 °C)-98.6 °F (37 °C)] 98.4 °F (36.9 °C)  Heart Rate:  [] 95  Resp:  [14-18] 16  BP: ()/(36-78) 105/43        Physical Exam:  Constitutional: No acute distress, looks comfortable.  HENT: NCAT, mucous membranes moist  Respiratory: Clear to auscultation bilaterally, respiratory effort normal   Cardiovascular: RRR, no murmurs, rubs, or gallops  Gastrointestinal: Positive bowel sounds, soft, nontender, nondistended  Musculoskeletal: No bilateral ankle edema  Psychiatric: Appropriate affect, cooperative  Neurologic: Oriented x 3, strength symmetric in all extremities, Cranial Nerves grossly intact to confrontation, speech clear  Skin: No rashes    Results Reviewed:  Results from last 7 days   Lab Units 20  0522 20  1103   WBC 10*3/mm3 6.68 4.99   HEMOGLOBIN g/dL 7.7* 12.0   HEMATOCRIT % 24.4* 36.9   PLATELETS 10*3/mm3 234 323   INR   --  0.99     Results from last 7 days   Lab Units 20  0522 20  1103   SODIUM mmol/L 139 141   POTASSIUM mmol/L 4.3 3.9   CHLORIDE mmol/L 104 102   CO2 mmol/L 26.0 27.0   BUN mg/dL 11 10   CREATININE mg/dL 0.88 0.93   GLUCOSE mg/dL 123* 114*   CALCIUM mg/dL 8.4* 10.0     Estimated Creatinine Clearance: 44.4 mL/min (by C-G formula based on SCr of 0.88 mg/dL).    Microbiology Results Abnormal     None                 Imaging Results (Last 24 Hours)     Procedure Component Value Units Date/Time    FL C Arm During Surgery [[id number]] Collected:  04/12/20 1858     Updated:  04/13/20 0919    Narrative:       EXAMINATION: FL C ARM DURING SURGERY - 04/12/2020     INDICATION: Hip trochanteric nailing.      S72.001A-Fracture of unspecified part of neck of right femur, initial  encounter for closed fracture.     COMPARISON: None.     FINDINGS: Dictation is to record 3 minutes and 58 seconds of fluoroscopy  time. Single AP view shows trochanteric nail placement apparently for  repair of previously noted intertrochanteric fracture. Of the bony  structures included, alignment appears normal. Please see the procedure  report for full details.       Impression:       Fluoroscopy provided during ORIF of the right hip.     DICTATED:   04/12/2020  EDITED/ls :   04/12/2020         This report was finalized on 4/13/2020 9:16 AM by Dr. Frankie Pryor MD.       XR Chest 1 View [[id number]] Collected:  04/12/20 1236     Updated:  04/12/20 1836    Narrative:       EXAMINATION: XR CHEST, SINGLE VIEW - 04/12/2020     INDICATION: Broken hip. Pre-op examination.      S72.001A-Fracture of unspecified part of neck of right femur, initial  encounter for closed fracture.     COMPARISON: 02/20/2017     FINDINGS: Heart and pulmonary vasculature appear normal in size but  lungs are well-expanded and appear clear. No edema, effusion or  pneumothorax is seen.       Impression:       No evidence of active chest disease.     DICTATED:   04/12/2020  EDITED/ls :   04/12/2020          This report was finalized on 4/12/2020 6:33 PM by Dr. Frankie Pryor MD.       XR Hip With or Without Pelvis 2 - 3 View Right [[id number]] Collected:  04/12/20 1239     Updated:  04/12/20 1831    Narrative:       EXAMINATION: XR RIGHT HIP W OR WO PELVIS, 2-3 VIEWS - 04/12/2020     INDICATION: Right hip pain status post fall one day prior.     S72.001A-Fracture of unspecified part of neck of  right femur, initial  encounter for closed fracture.     COMPARISON: None.     FINDINGS: There is an intertrochanteric fracture of the right hip with  mild, if any, comminution other than avulsion of the lesser trochanter.  Remaining bony structures appear osteopenic but intact.       Impression:       Acute intertrochanteric fracture of the right hip.      DICTATED:   04/12/2020  EDITED/ls :   04/12/2020      This report was finalized on 4/12/2020 6:28 PM by Dr. Frankie Pryor MD.                  I have reviewed the medications:  Scheduled Meds:  pantoprazole 40 mg Oral Daily   sodium chloride 10 mL Intravenous Q12H   vitamin B-12 1,000 mcg Oral Daily     Continuous Infusions:  ropivacaine (NAROPIN) 0.2% peripheral nerve cath (moog) 8 mL/hr Last Rate: 8 mL/hr (04/12/20 1834)   sodium chloride 0.9 % with KCl 20 mEq 100 mL/hr Last Rate: 100 mL/hr (04/12/20 1930)     PRN Meds:.•  acetaminophen **OR** acetaminophen **OR** acetaminophen  •  HYDROmorphone  •  ondansetron  •  sodium chloride    Assessment/Plan   Assessment & Plan     Active Hospital Problems    Diagnosis  POA   • **Closed fracture of right hip (CMS/[facility]) [S72.001A]  Yes   • Tachycardia [R00.0]  Yes   • GERD without esophagitis [K21.9]  Yes   • Osteoporosis with fracture [M80.80XA]  Yes      Resolved Hospital Problems   No resolved problems to display.        Brief Hospital Course to date:  Prisca Ellis is a 75 y.o. female with past medical history significant for osteoporosis, GERD.  Patient was admitted on 4/12/2020 for right hip fracture.  Patient has had surgery yesterday with no complications.    *Right hip fracture after a fall.  Status post surgery with no complication .    * postsurgical drop in H/H. Will monitor closely. If necessary will transfuse.    *GERD, currently asymptomatic.    PLAN:  - continue  current care.  - monitor H/H closely.  - monitor BP and HR closely      DVT Prophylaxis:      Disposition: I expect the patient to be discharged to  rehab.    CODE STATUS:   Code Status and Medical Interventions:   Ordered at: 04/12/20 1315     Level Of Support Discussed With:    Patient     Code Status:    CPR     Medical Interventions (Level of Support Prior to Arrest):    Full         Electronically signed by Cheko Burrell MD, 04/13/20, 13:58.

## 2020-04-13 NOTE — PROGRESS NOTES
[facility]    Acute pain service Inpatient Progress Note    Patient Name: Prisca Ellis  :  1945  MRN:  [id number]        Acute Pain  Service Inpatient Progress Note:    Analgesia:Good  LOC: alert and awake  Resp Status: room air  Cardiac: VS stable  Side Effects:None  Catheter Site:clean, dressing intact and dry  Cath type: peripheral nerve cath(MOOG pump)  Infusion rate: 8ml/hr  Catheter Plan:Catheter to remain Insitu and Continue catheter infusion rate unchanged

## 2020-04-13 NOTE — THERAPY EVALUATION
Acute Care - Occupational Therapy Initial Evaluation  [facility]     Patient Name: Prisca Ellis  : 1945  MRN: [id number]  Today's Date: 2020  Onset of Illness/Injury or Date of Surgery: 20  Date of Referral to OT: 20  Referring Physician: [place]    Admit Date: 2020       ICD-10-CM ICD-9-CM   1. Closed fracture of right hip, initial encounter (CMS/[facility]) S72.001A 820.8   2. Impaired mobility and ADLs Z74.09 799.89     Patient Active Problem List   Diagnosis   • Small bowel obstruction (CMS/[facility])   • Closed fracture of right hip (CMS/[facility])   • Tachycardia   • GERD without esophagitis   • Osteoporosis with fracture     Past Medical History:   Diagnosis Date   • Osteoporosis    • Ulcer      Past Surgical History:   Procedure Laterality Date   • COLONOSCOPY     • EXPLORATORY LAPAROTOMY N/A 2017    Procedure: LAPAROTOMY EXPLORATORY WITH LYSIS OF ADHESIONS; REDUCTION OF HERNIA  ;  Surgeon: Sami Griffiths MD;  Location: [facility];  Service:    • HIP TROCHANTERIC NAILING WITH INTRAMEDULLARY HIP SCREW Right 2020    Procedure: HIP TROCHANTERIC NAILING WITH INTRAMEDULLARY HIP SCREW;  Surgeon: Kash Akins Jr., MD;  Location: [facility];  Service: Orthopedics;  Laterality: Right;   • TUBAL ABDOMINAL LIGATION            OT ASSESSMENT FLOWSHEET (last 12 hours)      Occupational Therapy Evaluation     Row Name 20 1300                   OT Evaluation Time/Intention    Subjective Information  complains of;weakness;numbness  -TB        Document Type  evaluation;therapy note (daily note)  -TB        Mode of Treatment  occupational therapy;individual therapy  -TB        Patient Effort  good  -TB        Symptoms Noted During/After Treatment  none  -TB        Comment  Anxious/anticipates pain, but denies pain when asked  -TB           General Information    Patient Profile Reviewed?  yes  -TB        Onset of Illness/Injury or Date of Surgery  20  -TB        Referring Physician   [place]  -TB        Prior Level of Function  independent:;all household mobility;community mobility;transfer;ADL's;home management;driving;shopping;using stairs  -TB        Equipment Currently Used at Home  none  -TB        Pertinent History of Current Functional Problem  pt to ED s/p mechanical fall with c/o acute R hip pain. Imaging (+) R IT hip fracture. S/P R hip IM Nailing  -TB        Existing Precautions/Restrictions  fall;brace worn when out of bed;other (see comments) RLE Nerve block; RLE KI donned d/t acute weakness/(-)STR  -TB        Limitations/Impairments  safety/cognitive  -TB           Relationship/Environment    Primary Source of Support/Comfort  spouse;child(dilcia)  -TB        Lives With  spouse  -TB        Family Caregiver if Needed  spouse;child(dilcia), adult  -TB        Concerns About Impact on Relationships  Pt reports spouse currently battling metastatic bone cancer  -TB           Resource/Environmental Concerns    Current Living Arrangements  home/apartment/condo  -TB           Cognitive Assessment/Interventions    Additional Documentation  Cognitive Assessment/Intervention (Group)  -TB           Cognitive Assessment/Intervention- PT/OT    Affect/Mental Status (Cognitive)  anxious  -TB        Behavioral Issues (Cognitive)  overwhelmed easily  -TB        Orientation Status (Cognition)  oriented x 4  -TB        Follows Commands (Cognition)  follows one step commands;over 90% accuracy;verbal cues/prompting required;repetition of directions required;physical/tactile prompts required  -TB        Cognitive Function (Cognitive)  safety deficit  -TB        Safety Deficit (Cognitive)  safety precautions awareness;safety precautions follow-through/compliance  -TB           Safety Issues, Functional Mobility    Safety Issues Affecting Function (Mobility)  safety precaution awareness;safety precautions follow-through/compliance  -TB        Impairments Affecting Function (Mobility)  pain;range of motion  (ROM);strength  -TB        Comment, Safety Issues/Impairments (Mobility)  Anxiety limits function  -TB           Mobility Assessment/Treatment    Extremity Weight-bearing Status  right lower extremity  -TB        Right Lower Extremity (Weight-bearing Status)  weight-bearing as tolerated (WBAT)  -TB           Bed Mobility Assessment/Treatment    Bed Mobility Assessment/Treatment  supine-sit;scooting/bridging  -TB        Scooting/Bridging [place] (Bed Mobility)  minimum assist (75% patient effort);verbal cues  -TB        Supine-Sit [place] (Bed Mobility)  moderate assist (50% patient effort);verbal cues  -TB        Bed Mobility, Safety Issues  decreased use of legs for bridging/pushing  -TB        Assistive Device (Bed Mobility)  bed rails  -TB        Comment (Bed Mobility)  encouragement, increased time and cues/assist for sequencing; Leg  issued and teaching initiated  -TB           Functional Mobility    Functional Mobility- Ind. Level  minimum assist (75% patient effort);verbal cues required  -TB        Functional Mobility- Device  rolling walker  -TB        Functional Mobility-Distance (Feet)  6  -TB        Functional Mobility- Safety Issues  step length decreased;weight-shifting ability decreased;sequencing ability decreased;balance decreased during turns  -TB        Functional Mobility- Comment  encouragement, increased time; cues/assist for sequencing  -TB           Transfer Assessment/Treatment    Transfer Assessment/Treatment  sit-stand transfer;stand-sit transfer;bed-chair transfer;toilet transfer  -TB        Comment (Transfers)  encouragement, increased time; cues/assist for hand placement, sequencing and safety  -TB           Bed-Chair Transfer    Bed-Chair [place] (Transfers)  moderate assist (50% patient effort);verbal cues  -TB        Assistive Device (Bed-Chair Transfers)  walker, front-wheeled  -TB           Sit-Stand Transfer    Sit-Stand [place] (Transfers)  moderate  assist (50% patient effort);verbal cues  -TB        Assistive Device (Sit-Stand Transfers)  walker, front-wheeled  -TB           Stand-Sit Transfer    Stand-Sit [place] (Transfers)  minimum assist (75% patient effort);verbal cues  -TB        Assistive Device (Stand-Sit Transfers)  walker, front-wheeled  -TB           Toilet Transfer    Type (Toilet Transfer)  sit-stand;stand-sit  -TB        [place] Level (Toilet Transfer)  moderate assist (50% patient effort);verbal cues  -TB        Assistive Device (Toilet Transfer)  commode, bedside without drop arms;walker, front-wheeled  -TB           ADL Assessment/Intervention    15354 - OT Self Care/Mgmt Minutes  20  -TB        BADL Assessment/Intervention  feeding;toileting;lower body dressing;bathing  -TB           Bathing Assessment/Intervention    Bathing [place] Level  lower body;bathing skills  -TB        Assistive Devices (Bathing)  long-handled sponge  -TB        Comment (Bathing)  Education/demonstration initiated for use of AE to increase ADL independence; no showers until block d/c'd  -TB           Lower Body Dressing Assessment/Training    Lower Body Dressing [place] Level  lower body dressing skills  -TB        Assistive Devices (Lower Body Dressing)  long-handled shoe horn;reacher;sock-aid  -TB        Comment (Lower Body Dressing)  Education/demonstration initiated for use of AE to increase ADL independence; [place] mgmt  -TB           Self-Feeding Assessment/Training    [place] Level (Feeding)  set up;liquids to mouth  -TB        Position (Self-Feeding)  supported sitting  -TB           Toileting Assessment/Training    [place] Level (Toileting)  maximum assist (25% patient effort);adjust/manage clothing;set up;perform perineal hygiene  -TB        Assistive Devices (Toileting)  commode, bedside without drop arms  -TB        Toileting Position  supported sitting  -TB           BADL Safety/Performance    Impairments, BADL  Safety/Performance  balance;pain;range of motion;strength  -TB        Skilled BADL Treatment/Intervention  BADL process/adaptation training;adaptive equipment training  -TB           General ROM    GENERAL ROM COMMENTS  AROM BUE WFL  -TB           MMT (Manual Muscle Testing)    General MMT Comments  BUE WFL for ADLs, functionally 4 to 4+/5  -TB           Motor Assessment/Interventions    Additional Documentation  Balance (Group);Therapeutic Exercise (Group)  -TB           Therapeutic Exercise    Therapeutic Exercise  seated, upper extremities  -TB        Additional Documentation  Therapeutic Exercise (Row)  -TB           Upper Extremity Seated Therapeutic Exercise    Performed, Seated Upper Extremity (Therapeutic Exercise)  shoulder flexion/extension;shoulder abduction/adduction;shoulder external/internal rotation;shoulder horizontal abduction/adduction;elbow flexion/extension;forearm supination/pronation;wrist flexion/extension;digit flexion/extension  -TB        Exercise Type, Seated Upper Extremity (Therapeutic Exercise)  AROM (active range of motion)  -TB        Restrictions, Seated Upper Extremity (Therapeutic Exercise)  None  -TB           Balance    Balance  dynamic sitting balance;dynamic standing balance  -TB           Dynamic Sitting Balance    Level of [place], Reaches Outside Midline (Sitting, Dynamic Balance)  standby assist  -TB        Sitting Position, Reaches Outside Midline (Sitting, Dynamic Balance)  sitting in chair;sitting on edge of bed BSC  -TB        Comment, Reaches Outside Midline (Sitting, Dynamic Balance)  ADL tasks/AE teaching  -TB           Dynamic Standing Balance    Level of [place], Reaches Outside Midline (Standing, Dynamic Balance)  moderate assist, 50 to 74% patient effort  -TB        Time Able to Maintain Position, Reaches Outside Midline (Standing, Dynamic Balance)  2 to 3 minutes  -TB        Assistive Device Utilized (Supported Standing, Dynamic Balance)  walker,  "rolling  -TB        Comment, Reaches Outside Midline (Standing, Dynamic Balance)  Transfers training  -TB           Sensory Assessment/Intervention    Sensory General Assessment  no sensation deficits identified BUE intact; RLE numb   -TB           Positioning and Restraints    Pre-Treatment Position  in bed  -TB        Post Treatment Position  chair  -TB        In Chair  notified nsg;reclined;call light within reach;encouraged to call for assist;exit alarm on;waffle cushion;legs elevated N. block and KI intact  -TB           Pain Assessment    Additional Documentation  Pain Scale: Numbers Pre/Post-Treatment (Group)  -TB           Pain Scale: Numbers Pre/Post-Treatment    Pain Scale: Numbers, Pretreatment  0/10 - no pain  -TB        Pain Scale: Numbers, Post-Treatment  0/10 - no pain  -TB        Pain Location - Side  Right  -TB        Pain Location - Orientation  lower  -TB        Pain Location  extremity  -TB        Pre/Post Treatment Pain Comment  \"feels strange, doesn't hurt\"  -TB        Pain Intervention(s)  Ambulation/increased activity;Repositioned;Medication (See MAR) RLE nerve block  -TB           Wound Right lateral greater trochanter    Wound - Properties Group Side: Right  -RB Orientation: lateral  -RB Location: greater trochanter  -RB       Coping    Observed Emotional State  accepting;cooperative  -TB        Verbalized Emotional State  acceptance  -TB           Plan of Care Review    Plan of Care Reviewed With  patient  -TB           Clinical Impression (OT)    Date of Referral to OT  04/13/20  -TB        OT Diagnosis  Impaired mobility and ADL  -TB        Patient/Family Goals Statement (OT Eval)  to return home at d/c  -TB        Criteria for Skilled Therapeutic Interventions Met (OT Eval)  yes;treatment indicated  -TB        Rehab Potential (OT Eval)  good, to achieve stated therapy goals  -TB        Therapy Frequency (OT Eval)  daily  -TB        Care Plan Review (OT)  evaluation/treatment results " reviewed;care plan/treatment goals reviewed;risks/benefits reviewed;patient/other agree to care plan  -TB        Anticipated Equipment Needs at Discharge (OT)  bedside commode;shower chair Education initiated for recommended DME  -TB        Anticipated Discharge Disposition (OT)  home with assist;home with home health  -TB           Vital Signs    Pre Systolic BP Rehab  -- RN cleared OT  -TB        O2 Delivery Post Treatment  room air  -TB        Pre Patient Position  Supine  -TB        Intra Patient Position  Standing  -TB        Post Patient Position  Sitting  -TB           Planned OT Interventions    Planned Therapy Interventions (OT Eval)  transfer/mobility retraining;occupation/activity based interventions;BADL retraining;adaptive equipment training  -TB           OT Goals    Transfer Goal Selection (OT)  transfer, OT goal 1  -TB        Dressing Goal Selection (OT)  dressing, OT goal 1  -TB        Toileting Goal Selection (OT)  toileting, OT goal 1  -TB           Transfer Goal 1 (OT)    Activity/Assistive Device (Transfer Goal 1, OT)  walker, rolling;toilet ambulate to BR for transfers to support household distances  -TB        [place] Level/Cues Needed (Transfer Goal 1, OT)  contact guard assist;verbal cues required;tactile cues required  -TB        Time Frame (Transfer Goal 1, OT)  by discharge  -TB        Barriers (Transfers Goal 1, OT)  pain, strength, ROM  -TB        Progress/Outcome (Transfer Goal 1, OT)  goal ongoing  -TB           Dressing Goal 1 (OT)    Activity/Assistive Device (Dressing Goal 1, OT)  lower body dressing;long handled shoe horn;reacher;sock-aid  -TB        [place]/Cues Needed (Dressing Goal 1, OT)  moderate assist (50-74% patient effort);verbal cues required;tactile cues required  -TB        Time Frame (Dressing Goal 1, OT)  by discharge  -TB        Barriers (Dressing Goal 1, OT)  pain, strength, ROM  -TB        Progress/Outcome (Dressing Goal 1, OT)  goal ongoing  -TB            Toileting Goal 1 (OT)    Activity/Device (Toileting Goal 1, OT)  adjust/manage clothing;perform perineal hygiene;raised toilet seat  -TB        [place] Level/Cues Needed (Toileting Goal 1, OT)  minimum assist (75% or more patient effort);verbal cues required  -TB        Time Frame (Toileting Goal 1, OT)  by discharge  -TB        Barriers (Toileting Goal 1, OT)  pain, strength, ROM  -TB        Progress/Outcome (Toileting Goal 1, OT)  goal ongoing  -TB           Living Environment    Home Accessibility  -- Walk-in shower, standard commode  -TB          User Key  (r) = Recorded By, (t) = Taken By, (c) = Cosigned By    Initials Name Effective Dates    TB Rosario Lr, OT 06/08/18 -     Roxy Borrero RN 07/02/19 -                OT Recommendation and Plan  Outcome Summary/Treatment Plan (OT)  Anticipated Equipment Needs at Discharge (OT): bedside commode, shower chair(Education initiated for recommended DME)  Anticipated Discharge Disposition (OT): home with assist, home with home health  Planned Therapy Interventions (OT Eval): transfer/mobility retraining, occupation/activity based interventions, BADL retraining, adaptive equipment training  Therapy Frequency (OT Eval): daily  Plan of Care Review  Plan of Care Reviewed With: patient  Plan of Care Reviewed With: patient  Outcome Summary: OT IE completed. Pt alert, Ox4 and anxious. Mod A bed mobility with leg  introduced. Mod A tranfers to BSC and UIC. RLE KI donned for safety/knee buckling. AE introduced to support ADL independence. OT will follow IP. Recommend shower seat and BSC at d/c. Recommend home with family assist and HH to follow at d/c.    Outcome Measures     Row Name 04/13/20 1300             How much help from another is currently needed...    Putting on and taking off regular lower body clothing?  2  -TB      Bathing (including washing, rinsing, and drying)  2  -TB      Toileting (which includes using toilet bed pan or urinal)   2  -TB      Putting on and taking off regular upper body clothing  3  -TB      Taking care of personal grooming (such as brushing teeth)  3  -TB      Eating meals  4  -TB      AM-PAC 6 Clicks Score (OT)  16  -TB         Functional Assessment    Outcome Measure Options  AM-PAC 6 Clicks Daily Activity (OT)  -TB        User Key  (r) = Recorded By, (t) = Taken By, (c) = Cosigned By    Initials Name Provider Type    TB Rosario Lr OT Occupational Therapist          Time Calculation:   Time Calculation- OT     Row Name 04/13/20 1423 04/13/20 1300          Time Calculation- OT    OT Start Time  1300  -TB  --     OT Received On  04/13/20  -TB  --     OT Goal Re-Cert Due Date  04/23/20  -TB  --        Timed Charges    20169 - OT Self Care/Mgmt Minutes  --  20  -TB       User Key  (r) = Recorded By, (t) = Taken By, (c) = Cosigned By    Initials Name Provider Type     Rosario Lr OT Occupational Therapist        Therapy Charges for Today     Code Description Service Date Service Provider Modifiers Qty    [id number]  OT SELF CARE/MGMT/TRAIN EA 15 MIN 4/13/2020 Rosario Lr OT GO 1    [id number]  OT EVAL MOD COMPLEXITY 4 4/13/2020 Rosario Lr OT GO 1               Rosario Lr OT  4/13/2020

## 2020-04-13 NOTE — THERAPY EVALUATION
Patient Name: Prisca Ellis  : 1945    MRN: [id number]                              Today's Date: 2020       Admit Date: 2020    Visit Dx:     ICD-10-CM ICD-9-CM   1. Closed fracture of right hip, initial encounter (CMS/[facility]) S72.001A 820.8   2. Impaired mobility and ADLs Z74.09 799.89     Patient Active Problem List   Diagnosis   • Small bowel obstruction (CMS/[facility])   • Closed fracture of right hip (CMS/[facility])   • Tachycardia   • GERD without esophagitis   • Osteoporosis with fracture     Past Medical History:   Diagnosis Date   • Osteoporosis    • Ulcer      Past Surgical History:   Procedure Laterality Date   • COLONOSCOPY     • EXPLORATORY LAPAROTOMY N/A 2017    Procedure: LAPAROTOMY EXPLORATORY WITH LYSIS OF ADHESIONS; REDUCTION OF HERNIA  ;  Surgeon: Sami Griffiths MD;  Location: [facility];  Service:    • HIP TROCHANTERIC NAILING WITH INTRAMEDULLARY HIP SCREW Right 2020    Procedure: HIP TROCHANTERIC NAILING WITH INTRAMEDULLARY HIP SCREW;  Surgeon: Kash Akins Jr., MD;  [facility];  Service: Orthopedics;  Laterality: Right;   • TUBAL ABDOMINAL LIGATION       General Information     Row Name 20 143          PT Evaluation Time/Intention    Document Type  evaluation  -CLAUDETTE     Mode of Treatment  individual therapy;physical therapy  -CLAUDETTE     Row Name 20 143          General Information    Patient Profile Reviewed?  yes  -CLAUDETTE     Prior Level of Function  independent:;transfer;bathing;bed mobility;ADL's;all household mobility;community mobility;gait  -CLAUDETTE     Existing Precautions/Restrictions  fall;brace worn when out of bed;other (see comments) R nerve block, KI when OOB  -CLAUDETTE     Row Name 20 143          Relationship/Environment    Lives With  spouse  -CLAUDETTE     Row Name 20 143          Resource/Environmental Concerns    Current Living Arrangements  home/apartment/condo  -CLAUDETTE     Row Name 20 1430          Home Main Entrance    Number of Stairs, Main  Entrance  two [place] between steps  -CLAUDETTE     Stair Railings, Main Entrance  none  -CLAUDETTE     Row Name 04/13/20 1430          Stairs Within Home, Primary    Stairs, Within Home, Primary  14 [place] half way through  -     Number of Stairs, Within Home, Primary  other (see comments) 14  -CLAUDETTE     Stair Railings, Within Home, Primary  railings on both sides of stairs  -     Row Name 04/13/20 1430          Cognitive Assessment/Intervention- PT/OT    Orientation Status (Cognition)  oriented x 4  -CLAUDETTE     Row Name 04/13/20 1430          Safety Issues, Functional Mobility    Safety Issues Affecting Function (Mobility)  safety precautions follow-through/compliance;safety precaution awareness  -     Impairments Affecting Function (Mobility)  pain;range of motion (ROM);strength;endurance/activity tolerance  -       User Key  (r) = Recorded By, (t) = Taken By, (c) = Cosigned By    Initials Name Provider Type    Last Navarro, PT Physical Therapist        Mobility     Row Name 04/13/20 1430          Bed Mobility Assessment/Treatment    Comment (Bed Mobility)  Pt received UIC  -     Row Name 04/13/20 1430          Transfer Assessment/Treatment    Comment (Transfers)  Verbal cues for safe hand placement during standing/sitting and moving R LE out for comfort prior to sitting  -     Row Name 04/13/20 1430          Sit-Stand Transfer    Sit-Stand [place] (Transfers)  verbal cues;moderate assist (50% patient effort)  -     Assistive Device (Sit-Stand Transfers)  walker, front-wheeled  -CLAUDETTE     Row Name 04/13/20 1430          Gait/Stairs Assessment/Training    Gait/Stairs Assessment/Training  gait/ambulation independence;gait/ambulation assistive device  -     [place] Level (Gait)  verbal cues;contact guard;1 person to manage equipment chair follow  -CLAUDETTE     Assistive Device (Gait)  walker, front-wheeled  -     Distance in Feet (Gait)  60 feet  -     Pattern (Gait)  step-to;step-through  -CLAUDETTE      Deviations/Abnormal Patterns (Gait)  bilateral deviations;kimmy decreased;gait speed decreased;stride length decreased;antalgic  -CLAUDETTE     Bilateral Gait Deviations  forward flexed posture;weight shift ability decreased  -CLAUDETTE     Right Sided Gait Deviations  weight shift ability decreased;heel strike decreased  -CLAUDETTE     [place] Level (Stairs)  not tested  -CLAUDETTE     Comment (Gait/Stairs)  Pt initially ambulated with step to pattern, progressing to step through with cueing. Verbal cues for increasing step length, maintaining upright posture, body within walker, and increase WB on LEs. Gait limited by pain and fatigue.   -     Row Name 04/13/20 1430          Mobility Assessment/Intervention    Extremity Weight-bearing Status  right lower extremity  -     Right Lower Extremity (Weight-bearing Status)  weight-bearing as tolerated (WBAT)  -       User Key  (r) = Recorded By, (t) = Taken By, (c) = Cosigned By    Initials Name Provider Type    Last Navarro, PT Physical Therapist        Obj/Interventions     Row Name 04/13/20 1430          General ROM    GENERAL ROM COMMENTS  L LE AROM WFL; R LE AROM impaired 25%  -     Row Name 04/13/20 1430          MMT (Manual Muscle Testing)    General MMT Comments  L LE functionally 4+/5; R LE functionally 4-/5  -     Row Name 04/13/20 1430          Therapeutic Exercise    Lower Extremity (Therapeutic Exercise)  gluteal sets;quad sets, bilateral  -CLAUDETTE     Lower Extremity Range of Motion (Therapeutic Exercise)  ankle dorsiflexion/plantar flexion, bilateral  -CLAUDETTE     Exercise Type (Therapeutic Exercise)  AROM (active range of motion);isometric contraction, static  -CLAUDETTE     Position (Therapeutic Exercise)  seated  -     Sets/Reps (Therapeutic Exercise)  10x each  -CLAUDETTE     Expected Outcome (Therapeutic Exercise)  facilitate normal movement patterns;improve functional tolerance, self-care activity  -     Row Name 04/13/20 1430          Static Sitting Balance    Level of  [place] (Unsupported Sitting, Static Balance)  supervision  -CLAUDETTE     Sitting Position (Unsupported Sitting, Static Balance)  sitting in chair  -CLAUDETTE     Time Able to Maintain Position (Unsupported Sitting, Static Balance)  1 to 2 minutes  -CLAUDETTE     Row Name 04/13/20 1430          Static Standing Balance    Level of [place] (Supported Standing, Static Balance)  contact guard assist  -CLAUDETTE     Time Able to Maintain Position (Supported Standing, Static Balance)  1 to 2 minutes  -CLAUDETTE     Assistive Device Utilized (Supported Standing, Static Balance)  walker, rolling  -CLAUDETTE     Row Name 04/13/20 1430          Dynamic Standing Balance    Level of [place], Reaches Outside Midline (Standing, Dynamic Balance)  contact guard assist  -CLAUDETTE     Time Able to Maintain Position, Reaches Outside Midline (Standing, Dynamic Balance)  more than 5 minutes  -CLAUDETTE     Assistive Device Utilized (Supported Standing, Dynamic Balance)  walker, rolling  -CLAUDETTE     Row Name 04/13/20 1430          Sensory Assessment/Intervention    Sensory General Assessment  no sensation deficits identified  -CLAUDETTE       User Key  (r) = Recorded By, (t) = Taken By, (c) = Cosigned By    Initials Name Provider Type    CLAUDETTE Last Pérez, PT Physical Therapist        Goals/Plan     Row Name 04/13/20 1430          Bed Mobility Goal 1 (PT)    Activity/Assistive Device (Bed Mobility Goal 1, PT)  sit to supine/supine to sit  -CLAUDETTE     [place] Level/Cues Needed (Bed Mobility Goal 1, PT)  conditional independence  -CLAUDETTE     Time Frame (Bed Mobility Goal 1, PT)  long term goal (LTG);5 days  -[facility] Name 04/13/20 1430          Transfer Goal 1 (PT)    Activity/Assistive Device (Transfer Goal 1, PT)  sit-to-stand/stand-to-sit;walker, rolling  -CLAUDETTE     [place] Level/Cues Needed (Transfer Goal 1, PT)  conditional independence  -CLAUDETTE     Time Frame (Transfer Goal 1, PT)  long term goal (LTG);5 days  -[facility] Name 04/13/20 1430          Gait Training Goal 1 (PT)     Activity/Assistive Device (Gait Training Goal 1, PT)  gait (walking locomotion);walker, rolling  -CLAUDETTE     [place] Level (Gait Training Goal 1, PT)  conditional independence  -CLAUDETTE     Distance (Gait Goal 1, PT)  150 feet  -CLAUDETTE     Time Frame (Gait Training Goal 1, PT)  long term goal (LTG);5 days  -CLAUDETTE       User Key  (r) = Recorded By, (t) = Taken By, (c) = Cosigned By    Initials Name Provider Type    Last Navarro, PT Physical Therapist        Clinical Impression     Row Name 04/13/20 1430          Pain Assessment    Additional Documentation  Pain Scale: Numbers Pre/Post-Treatment (Group)  -CLAUDETTE     Row Name 04/13/20 1430          Pain Scale: Numbers Pre/Post-Treatment    Pain Scale: Numbers, Pretreatment  5/10  -CLAUDETTE     Pain Scale: Numbers, Post-Treatment  5/10  -CLAUDETTE     Pain Location - Side  Right  -CLAUDETTE     Pain Location - Orientation  lower  -CLAUDETTE     Pain Location  extremity  -CLAUDETTE     Pain Intervention(s)  Repositioned;Ambulation/increased activity  -CLAUDETTE     Row Name 04/13/20 1430          Physical Therapy Clinical Impression    Patient/Family Goals Statement (PT Clinical Impression)  Go to rehab  -CLAUDETTE     Criteria for Skilled Interventions Met (PT Clinical Impression)  yes;treatment indicated  -CLAUDETTE     Rehab Potential (PT Clinical Summary)  good, to achieve stated therapy goals  -CLAUDETTE     Row Name 04/13/20 1430          Positioning and Restraints    Pre-Treatment Position  sitting in chair/recliner  -CLAUDETTE     Post Treatment Position  chair  -CLAUDETTE     In Chair  notified nsg;reclined;call light within reach;encouraged to call for assist;exit alarm on;legs elevated;waffle cushion  -CLAUDETTE       User Key  (r) = Recorded By, (t) = Taken By, (c) = Cosigned By    Initials Name Provider Type    Last Navarro, PT Physical Therapist        Outcome Measures     Row Name 04/13/20 1430          How much help from another person do you currently need...    Turning from your back to your side while in flat bed without using bedrails?  3  -CLAUDETTE      Moving from lying on back to sitting on the side of a flat bed without bedrails?  3  -CLAUDETTE     Moving to and from a bed to a chair (including a wheelchair)?  3  -CLAUDETTE     Standing up from a chair using your arms (e.g., wheelchair, bedside chair)?  3  -CLAUDETTE     Climbing 3-5 steps with a railing?  2  -CLAUDETTE     To walk in hospital room?  3  -CLAUDETTE     AM-PAC 6 Clicks Score (PT)  17  -CLAUDETTE     Row Name 04/13/20 1430          Functional Assessment    Outcome Measure Options  AM-PAC 6 Clicks Basic Mobility (PT)  -CLAUDETTE       User Key  (r) = Recorded By, (t) = Taken By, (c) = Cosigned By    Initials Name Provider Type    Last Navarro, PT Physical Therapist          PT Recommendation and Plan  Planned Therapy Interventions (PT Eval): balance training, bed mobility training, gait training, home exercise program, patient/family education, strengthening, transfer training  Outcome Summary/Treatment Plan (PT)  Anticipated Equipment Needs at Discharge (PT): standard cane, front wheeled walker  Anticipated Discharge Disposition (PT): inpatient rehabilitation facility  Plan of Care Reviewed With: patient  Progress: improving  Outcome Summary: PT eval complete. Pt ambulated 60 feet using RW, chair follow, and CGA. Gait limited by pain and fatigue. STS requiring mod A. Reviewed HEP and pt educated on WBAT status. Will continue to progress strength and mobility as able. Recommend pt d/c to inpatient rehabilitation facility.     Time Calculation:   PT Charges     Row Name 04/13/20 1430             Time Calculation    Start Time  1430  -CLAUDETTE      PT Received On  04/13/20  -      PT Goal Re-Cert Due Date  04/23/20  -         Time Calculation- PT    Total Timed Code Minutes- PT  10 minute(s)  -         Timed Charges    00691 - Gait Training Minutes   10  -CLAUDETTE        User Key  (r) = Recorded By, (t) = Taken By, (c) = Cosigned By    Initials Name Provider Type    Last Navarro, PT Physical Therapist        Therapy Charges for Today     Code  Description Service Date Service Provider Modifiers Qty    41970443516 HC GAIT TRAINING EA 15 MIN 4/13/2020 Last Pérez, PT GP 1    76946721928 HC PT EVAL LOW COMPLEXITY 3 4/13/2020 Last Pérez, PT GP 1          PT G-Codes  Outcome Measure Options: AM-PAC 6 Clicks Basic Mobility (PT)  AM-PAC 6 Clicks Score (PT): 17  AM-PAC 6 Clicks Score (OT): 16    PT Discharge Summary  Anticipated Discharge Disposition (PT): inpatient rehabilitation facility    Last Pérez, PT  4/13/2020

## 2020-04-13 NOTE — PROGRESS NOTES
Orthopedic Daily Progress Note      CC: NICOLA overnight. Pt ambulated 60' with RW today. PT recommending inpt rehab at d/c.    Pain well controlled  General: no fevers, chills  Abdomen: no nausea, vomiting, or diarrhea    No other complaints    Physical Exam:  I have reviewed the vital signs.  Temp:  [97.2 °F (36.2 °C)-98.6 °F (37 °C)] 98.2 °F (36.8 °C)  Heart Rate:  [] 87  Resp:  [14-18] 16  BP: ()/(36-78) 112/52    Objective  General Appearance:    Alert, cooperative, no distress  Extremities: No clubbing, cyanosis, or edema to lower extremities  Pulses:  2+ in distal surgical extremity  Skin: Dressing Clean/dry/intact      Results Review:    I have reviewed the labs, radiology results and diagnostic studies:Hgb 8.6 this PM    Results from last 7 days   Lab Units 04/13/20  1422 04/13/20  0522   WBC 10*3/mm3  --  6.68   HEMOGLOBIN g/dL 8.6* 7.7*   PLATELETS 10*3/mm3  --  234     Results from last 7 days   Lab Units 04/13/20  0522   SODIUM mmol/L 139   POTASSIUM mmol/L 4.3   CO2 mmol/L 26.0   CREATININE mg/dL 0.88   GLUCOSE mg/dL 123*       I have reviewed the medications.    Assessment/Problem List  POD# 1 Day Post-Op   S/p R TFN    Plan  WBAT RLE  PT/OT  DVT ppx with Lovenox 40 mg sq daily x 2 weeks followed by  mg daily x 4 weeks        Discharge Planning: I expect patient to be discharged to inpt rehabd in TBD days.    Kash Akins Jr., MD  04/13/20  16:36

## 2020-04-13 NOTE — PLAN OF CARE
Problem: Patient Care Overview  Goal: Plan of Care Review  Flowsheets (Taken 4/13/2020 1430)  Progress: improving  Plan of Care Reviewed With: patient  Outcome Summary: PT eval complete. Pt ambulated 60 feet using RW, chair follow, and CGA. Gait limited by pain and fatigue. STS requiring mod A. Reviewed HEP and pt educated on WBAT status. Will continue to progress strength and mobility as able. Recommend pt d/c to inpatient rehabilitation facility.

## 2020-04-13 NOTE — PLAN OF CARE
Problem: Patient Care Overview  Goal: Plan of Care Review  Flowsheets (Taken 4/13/2020 1300)  Outcome Summary: OT IE completed. Pt alert, Ox4 and anxious. Mod A bed mobility with leg  introduced. Mod A tranfers to BSC and UIC. RLE KI donned for safety/knee buckling. AE introduced to support ADL independence. OT will follow IP. Recommend shower seat and BSC at d/c. Recommend home with family assist and HH to follow at d/c.

## 2020-04-13 NOTE — PLAN OF CARE
Received from PACU during shift change last evening. Alert and Oriented x4. VSS. Tolerating po. Adequate UOP. Hip with Prineo intact, scant amount of sanguinous drainage noted.

## 2020-04-13 NOTE — PROGRESS NOTES
Discharge Planning Assessment  [facility]     Patient Name: Prisca Ellis  MRN: [id number]  Today's Date: 4/13/2020    Admit Date: 4/12/2020    Discharge Needs Assessment     Row Name 04/13/20 1606       Living Environment    Lives With  spouse    Name(s) of Who Lives With Patient  :  Cory    Current Living Arrangements  home/apartment/condo    Family Caregiver if Needed  spouse    Quality of Family Relationships  helpful;involved;supportive    Able to Return to Prior Arrangements  yes       Resource/Environmental Concerns    Resource/Environmental Concerns  none    Transportation Concerns  car, none       Transition Planning    Patient/Family Anticipates Transition to  inpatient rehabilitation facility    Patient/Family Anticipated Services at Transition  rehabilitation services    Transportation Anticipated  family or friend will provide       Discharge Needs Assessment    Readmission Within the Last 30 Days  no previous admission in last 30 days    Equipment Currently Used at Home  none    Equipment Needed After Discharge  -- Defer to facility    Outpatient/Agency/Support Group Needs  skilled nursing facility    Discharge Facility/Level of Care Needs  nursing facility, skilled    Provided Post Acute Provider List?  Yes    Post Acute Provider List  Inpatient Rehab    Delivered To  Patient    Method of Delivery  In person    Patient's Choice of Community Agency(s)  [facility]        Discharge Plan     Row Name 04/13/20 1608       Plan    Plan  [facility]    Patient/Family in Agreement with Plan  yes    Plan Comments  Met with Ms. Ellis at the bedside for discharge planning.  Ms. Ellis lives with her  in a 2 story home in [place] [facility] [place].  Prior to admission, she was ambulating independently and was independent with her ADL's.  She's never been to IPR or had home health.  PT has evaluated the patient and recommended IPR.  Discused rehab with patient and she requested a referral to  [facility].  Patient referred to Rhett with [facility] and [facility] transport is tentatively scheduled for tomorrow at 3pm.    CM will follow up.    Final Discharge Disposition Code  03 - skilled nursing facility (SNF)        Destination      Coordination has not been started for this encounter.      Durable Medical Equipment      Coordination has not been started for this encounter.      Dialysis/Infusion      Coordination has not been started for this encounter.      Home Medical Care      Coordination has not been started for this encounter.      Therapy      Coordination has not been started for this encounter.      Community Resources      Coordination has not been started for this encounter.          Demographic Summary     Row Name 04/13/20 1604       General Information    Admission Type  inpatient    Arrived From  home    Reason for Consult  discharge planning    General Information Comments  PCP:  Alva Bear       Contact Information    Permission Granted to Share Info With      Contact Information Comments  :  Cory, ph [phone]        Functional Status     Row Name 04/13/20 1605       Functional Status    Usual Activity Tolerance  good    Current Activity Tolerance  -- See PT notes.       Functional Status, IADL    Medications  independent    Meal Preparation  independent    Housekeeping  independent    Laundry  independent    Shopping  independent       Mental Status    General Appearance WDL  WDL        Psychosocial    No documentation.       Abuse/Neglect    No documentation.       Legal    No documentation.       Substance Abuse    No documentation.       Patient Forms    No documentation.           Laura Horan RN

## 2020-04-14 LAB
HCT VFR BLD AUTO: 22.7 % (ref 34–46.6)
HCT VFR BLD AUTO: 22.8 % (ref 34–46.6)
HCT VFR BLD AUTO: 24 % (ref 34–46.6)
HCT VFR BLD AUTO: 25.2 % (ref 34–46.6)
HGB BLD-MCNC: 7.3 G/DL (ref 12–15.9)
HGB BLD-MCNC: 7.3 G/DL (ref 12–15.9)
HGB BLD-MCNC: 7.6 G/DL (ref 12–15.9)
HGB BLD-MCNC: 7.9 G/DL (ref 12–15.9)

## 2020-04-14 PROCEDURE — 97116 GAIT TRAINING THERAPY: CPT

## 2020-04-14 PROCEDURE — 85014 HEMATOCRIT: CPT | Performed by: INTERNAL MEDICINE

## 2020-04-14 PROCEDURE — 99232 SBSQ HOSP IP/OBS MODERATE 35: CPT | Performed by: INTERNAL MEDICINE

## 2020-04-14 PROCEDURE — 97535 SELF CARE MNGMENT TRAINING: CPT | Performed by: OCCUPATIONAL THERAPIST

## 2020-04-14 PROCEDURE — 85018 HEMOGLOBIN: CPT | Performed by: INTERNAL MEDICINE

## 2020-04-14 RX ADMIN — PANTOPRAZOLE SODIUM 40 MG: 40 TABLET, DELAYED RELEASE ORAL at 09:03

## 2020-04-14 RX ADMIN — ACETAMINOPHEN 650 MG: 325 TABLET, FILM COATED ORAL at 19:14

## 2020-04-14 RX ADMIN — CYANOCOBALAMIN TAB 1000 MCG 1000 MCG: 1000 TAB at 09:03

## 2020-04-14 RX ADMIN — ACETAMINOPHEN 650 MG: 325 TABLET, FILM COATED ORAL at 11:34

## 2020-04-14 NOTE — PLAN OF CARE
Problem: Patient Care Overview  Goal: Plan of Care Review  4/14/2020 1733 by Juana Dewey, RN  Outcome: Ongoing (interventions implemented as appropriate)  4/14/2020 1733 by Juana Dewey, RN  Reactivated  4/14/2020 1732 by Juana Dewey, RN  Outcome: Outcome(s) achieved   Pt had an uneventful shift. Little c/o pain controlled with tylenol. Up x2 assist. Voiding adequately. VSS. BP low but trending up. H&H q6h, trending up. Pt non-symptomatic. Will cont to monitor.    DISCHARGE

## 2020-04-14 NOTE — THERAPY TREATMENT NOTE
Acute Care - Occupational Therapy Treatment Note  [facility]     Patient Name: Prisca Ellis  : 1945  MRN: [id number]  Today's Date: 2020  Onset of Illness/Injury or Date of Surgery: 20  Date of Referral to OT: 20  Referring Physician: [place]    Admit Date: 2020       ICD-10-CM ICD-9-CM   1. Closed fracture of right hip, initial encounter (CMS/[facility]) S72.001A 820.8   2. Impaired mobility and ADLs Z74.09 799.89     Patient Active Problem List   Diagnosis   • Small bowel obstruction (CMS/[facility])   • Closed fracture of right hip (CMS/[facility])   • Tachycardia   • GERD without esophagitis   • Osteoporosis with fracture     Past Medical History:   Diagnosis Date   • Osteoporosis    • Ulcer      Past Surgical History:   Procedure Laterality Date   • COLONOSCOPY     • EXPLORATORY LAPAROTOMY N/A 2017    Procedure: LAPAROTOMY EXPLORATORY WITH LYSIS OF ADHESIONS; REDUCTION OF HERNIA  ;  Surgeon: Sami Griffiths MD;  Location: [facility];  Service:    • HIP TROCHANTERIC NAILING WITH INTRAMEDULLARY HIP SCREW Right 2020    Procedure: HIP TROCHANTERIC NAILING WITH INTRAMEDULLARY HIP SCREW;  Surgeon: Kash Akins Jr., MD;  Location: [facility];  Service: Orthopedics;  Laterality: Right;   • TUBAL ABDOMINAL LIGATION         Therapy Treatment    Rehabilitation Treatment Summary     Row Name 20 1500             Treatment Time/Intention    Discipline  occupational therapist  -AR      Document Type  therapy note (daily note)  -AR      Existing Precautions/Restrictions  fall  -AR      Treatment Considerations/Comments  pt seen at 1407  -AR2      Recorded by [AR] Nissa De La O, OT 20 1530  [AR2] Nissa De La O, OT 20 1531      Row Name 20 1500             Cognitive Assessment/Intervention- PT/OT    Affect/Mental Status (Cognitive)  WNL  -AR      Orientation Status (Cognition)  oriented x 4  -AR      Follows Commands (Cognition)  WFL  -AR      Safety Deficit  (Cognitive)  mild deficit  -AR      Recorded by [AR] Nissa De La O, OT 04/14/20 1530      Row Name 04/14/20 1500             Safety Issues, Functional Mobility    Safety Issues Affecting Function (Mobility)  positioning of assistive device;sequencing abilities  -AR      Impairments Affecting Function (Mobility)  endurance/activity tolerance;pain;range of motion (ROM)  -AR      Recorded by [AR] Nissa De La O, OT 04/14/20 1530      Row Name 04/14/20 1500             Mobility Assessment/Intervention    Extremity Weight-bearing Status  right lower extremity  -AR      Right Lower Extremity (Weight-bearing Status)  weight-bearing as tolerated (WBAT)  -AR      Recorded by [AR] Nissa De La O, OT 04/14/20 1530      Row Name 04/14/20 1500             Bed Mobility Assessment/Treatment    Scooting/Bridging [place] (Bed Mobility)  contact guard;verbal cues  -AR      Supine-Sit [place] (Bed Mobility)  verbal cues;moderate assist (50% patient effort)  -AR      Bed Mobility, Safety Issues  decreased use of legs for bridging/pushing  -AR      Assistive Device (Bed Mobility)  leg   -AR      Recorded by [AR] Nissa De La O, OT 04/14/20 1530      Row Name 04/14/20 1500             Functional Mobility    Functional Mobility- Ind. Level  contact guard assist;verbal cues required  -AR      Functional Mobility- Device  rolling walker  -AR      Functional Mobility-Distance (Feet)  15  -AR      Recorded by [AR] Nissa De La O, OT 04/14/20 1530      Row Name 04/14/20 1500             Transfer Assessment/Treatment    Transfer Assessment/Treatment  sit-stand transfer;stand-sit transfer;toilet transfer  -AR      Comment (Transfers)  Cues for hand placement, commode/bed approach and to advance RLE during stand-to-sit transition for improved comfort  -AR      Recorded by [AR] Nissa De La O, OT 04/14/20 1530      Row Name 04/14/20 1500             Sit-Stand Transfer    Sit-Stand [place]  (Transfers)  verbal cues;contact guard  -AR      Assistive Device (Sit-Stand Transfers)  walker, front-wheeled  -AR      Recorded by [AR] Nissa De La O OT 04/14/20 1530      Row Name 04/14/20 1500             Stand-Sit Transfer    Stand-Sit [place] (Transfers)  contact guard;verbal cues  -AR      Assistive Device (Stand-Sit Transfers)  walker, front-wheeled  -AR      Recorded by [AR] Nissa De La O OT 04/14/20 1530      Row Name 04/14/20 1500             Toilet Transfer    Type (Toilet Transfer)  sit-stand;stand-sit  -AR      [place] Level (Toilet Transfer)  minimum assist (75% patient effort);verbal cues  -AR      Assistive Device (Toilet Transfer)  grab bars/safety frame;raised toilet seat;walker, front-wheeled  -AR      Recorded by [AR] Nissa De La O OT 04/14/20 1530      Row Name 04/14/20 1500             ADL Assessment/Intervention    02646 - OT Self Care/Mgmt Minutes  40  -AR      BADL Assessment/Intervention  bathing;upper body dressing;lower body dressing;grooming;feeding;toileting  -AR      Recorded by [AR] Nissa De La O OT 04/14/20 1530      Row Name 04/14/20 1500             Bathing Assessment/Intervention    Comment (Bathing)  Reviewed use of LH sponge to assist with LBB.   -AR      Recorded by [AR] Nissa De La O OT 04/14/20 1530      Row Name 04/14/20 1500             Upper Body Dressing Assessment/Training    Upper Body Dressing [place] Level  don;pajama/robe;minimum assist (75% patient effort)  -AR      Upper Body Dressing Position  edge of bed sitting  -AR      Recorded by [AR] Nissa De La O OT 04/14/20 1530      Row Name 04/14/20 1500             Lower Body Dressing Assessment/Training    Lower Body Dressing [place] Level  doff;don;socks;minimum assist (75% patient effort)  -AR      Assistive Devices (Lower Body Dressing)  reacher;sock-aid  -AR      Lower Body Dressing Position  edge of bed sitting  -AR      Comment (Lower Body Dressing)   Pt unable to reacher BLE for ADLS without AE d/t pain and ROM limitations. OT reviewed ADL retraining including incorporation of graciela-dressing technique and AE use.   -AR      Recorded by [AR] Nissa De La O OT 04/14/20 1530      Row Name 04/14/20 1500             Grooming Assessment/Training    [place] Level (Grooming)  wash face, hands;supervision  -AR      Grooming Position  supported sitting  -AR      Recorded by [AR] Nissa De La O OT 04/14/20 1530      Row Name 04/14/20 1500             Self-Feeding Assessment/Training    [place] Level (Feeding)  liquids to mouth;supervision  -AR      Position (Self-Feeding)  supported sitting  -AR      Recorded by [AR] Nissa De La O OT 04/14/20 1530      Row Name 04/14/20 1500             Toileting Assessment/Training    [place] Level (Toileting)  toileting skills;adjust/manage clothing;maximum assist (25% patient effort);perform perineal hygiene;moderate assist (50% patient effort)  -AR      Assistive Devices (Toileting)  grab bar/safety frame;raised toilet seat  -AR      Toileting Position  supported sitting;supported standing  -AR      Recorded by [AR] Nissa De La O OT 04/14/20 1530      Row Name 04/14/20 1500             Motor Skills Assessment/Interventions    Additional Documentation  Balance (Group);Balance Interventions (Group)  -AR      Recorded by [AR] Nissa De La O OT 04/14/20 1530      Row Name 04/14/20 1500             Balance    Balance  static sitting balance;static standing balance  -AR      Recorded by [AR] Nissa De La O OT 04/14/20 1530      Row Name 04/14/20 1500             Static Sitting Balance    Level of [place] (Unsupported Sitting, Static Balance)  supervision  -AR      Sitting Position (Unsupported Sitting, Static Balance)  sitting on edge of bed  -AR      Time Able to Maintain Position (Unsupported Sitting, Static Balance)  more than 5 minutes  -AR      Recorded by [AR] Nissa De La O  PAUL Rivera 04/14/20 1530      Row Name 04/14/20 1500             Static Standing Balance    Level of [place] (Supported Standing, Static Balance)  contact guard assist  -AR      Time Able to Maintain Position (Supported Standing, Static Balance)  1 to 2 minutes  -AR      Assistive Device Utilized (Supported Standing, Static Balance)  walker, rolling  -AR      Recorded by [AR] Nissa De La O OT 04/14/20 1530      Row Name 04/14/20 1500             Positioning and Restraints    Pre-Treatment Position  sitting in chair/recliner  -AR      Post Treatment Position  bed  -AR      In Bed  notified nsg;supine;call light within reach;encouraged to call for assist;exit alarm on;SCD pump applied  -AR      Recorded by [AR] Nsisa De La O OT 04/14/20 1530      Row Name 04/14/20 1500             Pain Scale: Numbers Pre/Post-Treatment    Pain Scale: Numbers, Pretreatment  3/10  -AR      Pain Scale: Numbers, Post-Treatment  4/10  -AR      Pain Location - Side  Right  -AR      Pain Location  groin  -AR      Pain Intervention(s)  Repositioned;Ambulation/increased activity RFN notified  -AR      Recorded by [AR] Nissa De La O OT 04/14/20 1530      Row Name                Wound Right lateral greater trochanter    Wound - Properties Group Side: Right [RB] Orientation: lateral [RB2] Location: greater trochanter [RB] Recorded by:  [RB] Roxy rCook RN 04/12/20 1650 [RB2] Roxy Crook RN 04/12/20 1651    Row Name 04/14/20 1500             Plan of Care Review    Plan of Care Reviewed With  patient  -AR      Progress  improving  -AR      Recorded by [AR] Nissa De La O OT 04/14/20 1530      Row Name 04/14/20 1500             Outcome Summary/Treatment Plan (OT)    Daily Summary of Progress (OT)  progress toward functional goals as expected  -AR      Anticipated Discharge Disposition (OT)  inpatient rehabilitation facility  -AR      Recorded by [AR] Nissa De La O OT 04/14/20 1530        User Key  (r) =  Recorded By, (t) = Taken By, (c) = Cosigned By    Initials Name Effective Dates Discipline    Nissa Agarwal OT 06/22/15 -  OT    Roxy Borrero RN 07/02/19 -  Nurse        Wound Right lateral greater trochanter (Active)   Dressing Appearance dried drainage;dry;intact 4/14/2020  8:45 AM   Closure Liquid skin adhesive 4/13/2020  8:10 PM   Drainage Characteristics/Odor sanguineous 4/13/2020  6:00 PM   Drainage Amount scant 4/13/2020  6:00 PM     Rehab Goal Summary     Row Name 04/14/20 1500             Transfer Goal 1 (OT)    Progress/Outcome (Transfer Goal 1, OT)  goal met  -AR         Dressing Goal 1 (OT)    Progress/Outcome (Dressing Goal 1, OT)  goal partially met;goal ongoing  -AR         Toileting Goal 1 (OT)    Progress/Outcome (Toileting Goal 1, OT)  goal ongoing  -AR        User Key  (r) = Recorded By, (t) = Taken By, (c) = Cosigned By    Initials Name Provider Type Discipline    Nissa Agarwal, OT Occupational Therapist OT            OT Recommendation and Plan  Outcome Summary/Treatment Plan (OT)  Daily Summary of Progress (OT): progress toward functional goals as expected  Anticipated Discharge Disposition (OT): inpatient rehabilitation facility  Daily Summary of Progress (OT): progress toward functional goals as expected  Plan of Care Review  Plan of Care Reviewed With: patient  Plan of Care Reviewed With: patient  Outcome Summary: pt alert, Ox4. She completed toilet transfer with min assist, post-toilet hygiene with mod assist, UB dressing with min assist, LB dressing with min assist with AE and bed mobility with mod assist. Recommend IPR as pt lives with elderly spouse who cannot physically assist.  Outcome Measures     Row Name 04/14/20 1500 04/13/20 1300          How much help from another is currently needed...    Putting on and taking off regular lower body clothing?  2  -AR  2  -TB     Bathing (including washing, rinsing, and drying)  2  -AR  2  -TB     Toileting (which includes  using toilet bed pan or urinal)  2  -AR  2  -TB     Putting on and taking off regular upper body clothing  3  -AR  3  -TB     Taking care of personal grooming (such as brushing teeth)  3  -AR  3  -TB     Eating meals  3  -AR  4  -TB     AM-PAC 6 Clicks Score (OT)  15  -AR  16  -TB        Functional Assessment    Outcome Measure Options  AM-PAC 6 Clicks Daily Activity (OT)  -AR  AM-PAC 6 Clicks Daily Activity (OT)  -TB       User Key  (r) = Recorded By, (t) = Taken By, (c) = Cosigned By    Initials Name Provider Type     Rosario Lr, OT Occupational Therapist    Nissa Agarwal OT Occupational Therapist           Time Calculation:   Time Calculation- OT     Row Name 04/14/20 1500 04/14/20 1130          Time Calculation- OT    OT Start Time  1500  -AR  --     Total Timed Code Minutes- OT  40 minute(s)  -AR  --     OT Received On  04/14/20  -AR  --     OT Goal Re-Cert Due Date  04/23/20  -AR  --        Timed Charges    35231 - Gait Training Minutes   --  20  -SC     99289 - OT Self Care/Mgmt Minutes  40  -AR  --       User Key  (r) = Recorded By, (t) = Taken By, (c) = Cosigned By    Initials Name Provider Type    SC Lance Velásquez, PT Physical Therapist    Nissa Agarwal OT Occupational Therapist        Therapy Charges for Today     Code Description Service Date Service Provider Modifiers Qty    64389322810 HC OT SELF CARE/MGMT/TRAIN EA 15 MIN 4/14/2020 Nissa De La O OT GO 3    01280525233 HC OT THER SUPP EA 15 MIN 4/14/2020 Nissa De La O OT GO 2               Nissa De La O OT  4/14/2020

## 2020-04-14 NOTE — THERAPY TREATMENT NOTE
Patient Name: Prisca Ellis  : 1945    MRN: [id number]                              Today's Date: 2020       Admit Date: 2020    Visit Dx:     ICD-10-CM ICD-9-CM   1. Closed fracture of right hip, initial encounter (CMS/[facility]) S72.001A 820.8   2. Impaired mobility and ADLs Z74.09 799.89     Patient Active Problem List   Diagnosis   • Small bowel obstruction (CMS/[facility])   • Closed fracture of right hip (CMS/[facility])   • Tachycardia   • GERD without esophagitis   • Osteoporosis with fracture     Past Medical History:   Diagnosis Date   • Osteoporosis    • Ulcer      Past Surgical History:   Procedure Laterality Date   • COLONOSCOPY     • EXPLORATORY LAPAROTOMY N/A 2017    Procedure: LAPAROTOMY EXPLORATORY WITH LYSIS OF ADHESIONS; REDUCTION OF HERNIA  ;  Surgeon: Sami Griffiths MD;  Location: [facility];  Service:    • HIP TROCHANTERIC NAILING WITH INTRAMEDULLARY HIP SCREW Right 2020    Procedure: HIP TROCHANTERIC NAILING WITH INTRAMEDULLARY HIP SCREW;  Surgeon: Kash Akins Jr., MD;  Location: [facility];  Service: Orthopedics;  Laterality: Right;   • TUBAL ABDOMINAL LIGATION       General Information     [facility] Name 20 1338          PT Evaluation Time/Intention    Document Type  therapy note (daily note)  -SC     Mode of Treatment  physical therapy  -[facility] Name 20 1338          General Information    Patient Profile Reviewed?  yes  -SC     Existing Precautions/Restrictions  fall nerve cath removed today, brace not needed  -[facility] Name 20 1338          Cognitive Assessment/Intervention- PT/OT    Orientation Status (Cognition)  oriented x 4  -SC     Cognitive Assessment/Intervention Comment  alert, following commands  -[facility] Name 20 1338          Safety Issues, Functional Mobility    Impairments Affecting Function (Mobility)  pain;range of motion (ROM);strength;endurance/activity tolerance  -SC       User Key  (r) = Recorded By, (t) = Taken By, (c) = Cosigned  By    Initials Name Provider Type    SC Lance Velásquez, PT Physical Therapist        Mobility     Row Name 04/14/20 4464          Bed Mobility Assessment/Treatment    Bed Mobility Assessment/Treatment  supine-sit;scooting/bridging  -SC     Scooting/Bridging [place] (Bed Mobility)  verbal cues;1 person assist;moderate assist (50% patient effort)  -SC     Supine-Sit [place] (Bed Mobility)  verbal cues;moderate assist (50% patient effort)  -SC     Assistive Device (Bed Mobility)  bed rails;head of bed elevated  -SC     Comment (Bed Mobility)  cues for sequencing moving to edge of bed. Patient having difficulty todaty 2 to increased R hip pain. Required PT to give extra assistance with upper trunk. Transfer very slow   -SC     Row Name 04/14/20 9379          Transfer Assessment/Treatment    Comment (Transfers)  cues for hand placement and sequencing. Demonstrated good techn ique  -SC     Row Name 04/14/20 1339          Sit-Stand Transfer    Sit-Stand [place] (Transfers)  verbal cues;contact guard  -SC     Assistive Device (Sit-Stand Transfers)  walker, front-wheeled  -SC     Row Name 04/14/20 1154          Gait/Stairs Assessment/Training    Gait/Stairs Assessment/Training  gait/ambulation independence  -SC     [place] Level (Gait)  verbal cues;minimum assist (75% patient effort);1 person assist;1 person to manage equipment  -SC     Assistive Device (Gait)  walker, front-wheeled  -SC     Distance in Feet (Gait)  80  -SC     Pattern (Gait)  step-through  -SC     Deviations/Abnormal Patterns (Gait)  right sided deviations;antalgic;gait speed decreased;stride length decreased  -SC     Bilateral Gait Deviations  forward flexed posture;weight shift ability decreased  -SC     Comment (Gait/Stairs)  Gt training focused on sequencing walker. Encouraged to improve heel strike . Also needs to keep walker out  more away from her hips. . Demonstrated slow antalgic gait  -SC     Row Name 04/14/20 6998           Mobility Assessment/Intervention    Extremity Weight-bearing Status  right lower extremity  -SC     Right Lower Extremity (Weight-bearing Status)  weight-bearing as tolerated (WBAT)  -SC       User Key  (r) = Recorded By, (t) = Taken By, (c) = Cosigned By    Initials Name Provider Type    Lance Mo, KIA Physical Therapist        Obj/Interventions     [facility] Name 04/14/20 1347          Therapeutic Exercise    Lower Extremity (Therapeutic Exercise)  gluteal sets;heel slides, left;quad sets, bilateral  -SC     Lower Extremity Range of Motion (Therapeutic Exercise)  ankle dorsiflexion/plantar flexion, bilateral  -SC     Position (Therapeutic Exercise)  supine  -SC     Sets/Reps (Therapeutic Exercise)  10  -SC     Row Name 04/14/20 1341          Dynamic Standing Balance    Level of [place], Reaches Outside Midline (Standing, Dynamic Balance)  contact guard assist  -SC     Assistive Device Utilized (Supported Standing, Dynamic Balance)  walker, rolling  -SC       User Key  (r) = Recorded By, (t) = Taken By, (c) = Cosigned By    Initials Name Provider Type    SC Lance Velásquez, PT Physical Therapist        Goals/Plan    No documentation.       Clinical Impression     [facility] Name 04/14/20 9761          Pain Assessment    Additional Documentation  Pain Scale: FACES Pre/Post-Treatment (Group)  -SC     Row Name 04/14/20 0819          Pain Scale: Numbers Pre/Post-Treatment    Pain Location - Side  Right  -SC     Pain Location - Orientation  lower  -SC     Pain Location  extremity  -SC     Pain Intervention(s)  Repositioned;Cold applied  -[facility] Name 04/14/20 6924          Pain Scale: FACES Pre/Post-Treatment    Pain: FACES Scale, Pretreatment  2-->hurts little bit  -SC     Pain: FACES Scale, Post-Treatment  8-->hurts whole lot  -[facility] Name 04/14/20 3527          Plan of Care Review    Plan of Care Reviewed With  patient  -SC     Progress  improving  -SC     Outcome Summary  Patient had nerve cath removed today.  She required increase in assistance during bed mobility. Her ambulation remains slow and antalgic with no need for knee brace at this time  -[facility] Name 04/14/20 1344          Physical Therapy Clinical Impression    Criteria for Skilled Interventions Met (PT Clinical Impression)  yes;treatment indicated  -SC     Rehab Potential (PT Clinical Summary)  good, to achieve stated therapy goals  -[facility] Name 04/14/20 1347          Vital Signs    Pre Systolic BP Rehab  115  -SC     Pre Treatment Diastolic BP  56  -SC     Intra Treatment Diastolic BP  58  -SC     Post Systolic BP Rehab  100  -SC     Intratreatment Heart Rate (beats/min)  115  -SC     Posttreatment Heart Rate (beats/min)  86  -[facility] Name 04/14/20 1344          Positioning and Restraints    Pre-Treatment Position  in bed  -SC     Post Treatment Position  chair  -SC     In Chair  call light within reach;reclined;notified nsg  -SC       User Key  (r) = Recorded By, (t) = Taken By, (c) = Cosigned By    Initials Name Provider Type    Lance Mo, PT Physical Therapist        Outcome Measures     [facility] Name 04/14/20 1346          How much help from another person do you currently need...    Turning from your back to your side while in flat bed without using bedrails?  4  -SC     Moving from lying on back to sitting on the side of a flat bed without bedrails?  2  -SC     Moving to and from a bed to a chair (including a wheelchair)?  3  -SC     Standing up from a chair using your arms (e.g., wheelchair, bedside chair)?  3  -SC     Climbing 3-5 steps with a railing?  2  -SC     To walk in hospital room?  3  -SC     AM-PAC 6 Clicks Score (PT)  17  -SC     Row Name 04/14/20 1346          Functional Assessment    Outcome Measure Options  AM-PAC 6 Clicks Basic Mobility (PT)  -SC       User Key  (r) = Recorded By, (t) = Taken By, (c) = Cosigned By    Initials Name Provider Type    Lance Mo PT Physical Therapist          PT Recommendation and  Plan     Outcome Summary/Treatment Plan (PT)  Anticipated Equipment Needs at Discharge (PT): standard cane, front wheeled walker  Anticipated Discharge Disposition (PT): inpatient rehabilitation facility  Plan of Care Reviewed With: patient  Progress: improving  Outcome Summary: Patient had nerve cath removed today. She required increase in assistance during bed mobility. Her ambulation remains slow and antalgic with no need for knee brace at this time     Time Calculation:   PT Charges     Row Name 04/14/20 1130             Time Calculation    Start Time  1130  -SC      PT Received On  04/14/20  -SC      PT Goal Re-Cert Due Date  04/23/20  -SC         Time Calculation- PT    Total Timed Code Minutes- PT  1130 minute(s)  -SC         Timed Charges    30471 - PT Therapeutic Exercise Minutes  2  -SC      50512 - Gait Training Minutes   20  -SC      78865 - PT Therapeutic Activity Minutes  5  -SC        User Key  (r) = Recorded By, (t) = Taken By, (c) = Cosigned By    Initials Name Provider Type    SC Lance Velásquez, PT Physical Therapist        Therapy Charges for Today     Code Description Service Date Service Provider Modifiers Qty    22700012009 HC GAIT TRAINING EA 15 MIN 4/14/2020 Lance Velásquez, PT GP 2    11673681586 HC PT THER SUPP EA 15 MIN 4/14/2020 Lance Velásquez, PT GP 2          PT G-Codes  Outcome Measure Options: AM-PAC 6 Clicks Basic Mobility (PT)  AM-PAC 6 Clicks Score (PT): 17  AM-PAC 6 Clicks Score (OT): 16    Lance Velásquez PT  4/14/2020

## 2020-04-14 NOTE — PROGRESS NOTES
[facility] Medicine Services  PROGRESS NOTE    Patient Name: Prisca Ellis  : 1945  MRN: [id number]    Date of Admission: 2020  Primary Care Physician: Alva Bear MD    Subjective   Subjective     CC:  Hip pain      HPI:  Resting in bed in no acute distress and overall comfortable.  Feels a little weak but pain is well controlled.  No fever or chills.  No chest pain, palpitation, shortness of breath at rest.  No cough or coryza.  No nausea, vomiting, diarrhea, abdominal pain.  No headache    Review of Systems     Objective   Objective     Vital Signs:   Temp:  [98 °F (36.7 °C)-98.7 °F (37.1 °C)] 98.7 °F (37.1 °C)  Heart Rate:  [74-93] 90  Resp:  [14-16] 16  BP: ()/(40-52) 102/51        Physical Exam:  Constitutional: No acute distress, looks comfortable.  HENT: NCAT, mucous membranes moist  Respiratory: Clear to auscultation bilaterally, respiratory effort normal   Cardiovascular: RRR, no murmurs, rubs, or gallops  Gastrointestinal: Positive bowel sounds, soft, nontender, nondistended  Musculoskeletal: No bilateral ankle edema  Psychiatric: Appropriate affect, cooperative  Neurologic: Oriented x 3, strength symmetric in all extremities, Cranial Nerves grossly intact to confrontation, speech clear  Skin: No rashes    Results Reviewed:  Results from last 7 days   Lab Units 20  1208 20  0529 20  0012  20  0522 20  1103   WBC 10*3/mm3  --   --   --   --  6.68 4.99   HEMOGLOBIN g/dL 7.9* 7.3* 7.3*   < > 7.7* 12.0   HEMATOCRIT % 25.2* 22.8* 22.7*   < > 24.4* 36.9   PLATELETS 10*3/mm3  --   --   --   --  234 323   INR   --   --   --   --   --  0.99    < > = values in this interval not displayed.     Results from last 7 days   Lab Units 20  0522 20  1103   SODIUM mmol/L 139 141   POTASSIUM mmol/L 4.3 3.9   CHLORIDE mmol/L 104 102   CO2 mmol/L 26.0 27.0   BUN mg/dL 11 10   CREATININE mg/dL 0.88 0.93   GLUCOSE mg/dL 123* 114*      CALCIUM mg/dL 8.4* 10.0     Estimated Creatinine Clearance: 44.4 mL/min (by C-G formula based on SCr of 0.88 mg/dL).    Microbiology Results Abnormal     None          Imaging Results (Last 24 Hours)     ** No results found for the last 24 hours. **               I have reviewed the medications:  Scheduled Meds:    pantoprazole 40 mg Oral Daily   sodium chloride 10 mL Intravenous Q12H   vitamin B-12 1,000 mcg Oral Daily     Continuous Infusions:    ropivacaine (NAROPIN) 0.2% peripheral nerve cath (moog) 8 mL/hr Last Rate: 8 mL/hr (04/12/20 1834)   sodium chloride 0.9 % with KCl 20 mEq 100 mL/hr Last Rate: 100 mL/hr (04/12/20 1930)     PRN Meds:.•  acetaminophen **OR** acetaminophen **OR** acetaminophen  •  HYDROmorphone  •  ondansetron  •  sodium chloride    Assessment/Plan   Assessment & Plan     Active Hospital Problems    Diagnosis  POA   • **Closed fracture of right hip (CMS/[facility]) [S72.001A]  Yes   • Tachycardia [R00.0]  Yes   • GERD without esophagitis [K21.9]  Yes   • Osteoporosis with fracture [M80.80XA]  Yes      Resolved Hospital Problems   No resolved problems to display.        Brief Hospital Course to date:  Prisca Ellis is a 75 y.o. female with past medical history significant for osteoporosis, GERD.  Patient was admitted on 4/12/2020 for right hip fracture.  Patient has had surgery yesterday with no complications.    *Right hip fracture after a fall.  Status post surgery with no complication .    * postsurgical drop in H/H. Will monitor closely. If necessary will transfuse.    *GERD, currently asymptomatic.    PLAN:  - continue  current care.  - monitor H/H closely.  - monitor BP and HR closely  -iron studies in am      DVT Prophylaxis:      Disposition: I expect the patient to be discharged to rehab.    CODE STATUS:   Code Status and Medical Interventions:   Ordered at: 04/12/20 1315     Level Of Support Discussed With:    Patient     Code Status:    CPR     Medical Interventions (Level of Support  Prior to Arrest):    Full         Electronically signed by Cheko Burrell MD, 04/14/20, 12:56.

## 2020-04-14 NOTE — PLAN OF CARE
Problem: Patient Care Overview  Goal: Plan of Care Review  Flowsheets  Taken 4/14/2020 1347  Progress: improving  Plan of Care Reviewed With: patient  Taken 4/14/2020 1348  Outcome Summary: Patient had nerve cath removed today. She required increase in assistance during bed mobility. Her ambulation remains slow and antalgic with no need for knee brace at this time

## 2020-04-14 NOTE — PROGRESS NOTES
[facility]    Acute pain service Inpatient Progress Note    Patient Name: Prisca Ellis  :  1945  MRN:  [id number]        Acute Pain  Service Inpatient Progress Note:    Analgesia:Good  LOC: alert and awake  Resp Status: room air  Cardiac: VS stable  Side Effects:None  Catheter Site:clean, dressing intact and dry  Cath type: peripheral nerve cath(MOOG pump)  Infusion rate: 8ml/hr  Catheter Plan:Anesthesia removed catheter and tip intact  Comments: Pt up, ambulating with assist, to rehab today?, will d/c.  Thank you

## 2020-04-14 NOTE — PROGRESS NOTES
"Orthopedic Daily Progress Note      CC: NICOLA overnight. Patient doing well with PT. She does have some anemia, which did not appear to be symptomatic with PT this AM.    Pain well controlled  General: no fevers, chills  Abdomen: no nausea, vomiting, or diarrhea    No other complaints    Physical Exam:  I have reviewed the vital signs.  Temp:  [98 °F (36.7 °C)-98.7 °F (37.1 °C)] 98.7 °F (37.1 °C)  Heart Rate:  [74-93] 90  Resp:  [14-16] 16  BP: ()/(40-52) 102/51    Objective:  Vital signs: (most recent): Blood pressure 102/51, pulse 90, temperature 98.7 °F (37.1 °C), temperature source Oral, resp. rate 16, height 152.4 cm (60\"), weight 51.7 kg (114 lb), SpO2 99 %.            General Appearance:    Alert, cooperative, no distress  Extremities: No clubbing, cyanosis, or edema to lower extremities  Pulses:  2+ in distal surgical extremity  Skin: Dressing Clean/dry/intact      Results Review:    I have reviewed the labs, radiology results and diagnostic studies:Hgb 7.3 this PM    Results from last 7 days   Lab Units 04/14/20  0529  04/13/20  0522   WBC 10*3/mm3  --   --  6.68   HEMOGLOBIN g/dL 7.3*   < > 7.7*   PLATELETS 10*3/mm3  --   --  234    < > = values in this interval not displayed.     Results from last 7 days   Lab Units 04/13/20  0522   SODIUM mmol/L 139   POTASSIUM mmol/L 4.3   CO2 mmol/L 26.0   CREATININE mg/dL 0.88   GLUCOSE mg/dL 123*       I have reviewed the medications.    Assessment/Problem List  POD# 2 Day Post-Op   S/p R TFN  Post op blood loss anemia    Plan  WBAT RLE  PT/OT  DVT ppx with Lovenox 40 mg sq daily x 2 weeks followed by  mg daily x 4 weeks        Discharge Planning: I expect patient to be discharged to in rehab/[facility] in 0-2 days.    Kash Akins Jr., MD  04/14/20  12:17            "

## 2020-04-14 NOTE — PLAN OF CARE
Problem: Patient Care Overview  Goal: Plan of Care Review  Flowsheets  Taken 4/14/2020 1500  Progress: improving  Plan of Care Reviewed With: patient  Taken 4/14/2020 1531  Outcome Summary: pt alert, Ox4. She completed toilet transfer with min assist, post-toilet hygiene with mod assist, UB dressing with min assist, LB dressing with min assist with AE and bed mobility with mod assist. Recommend IPR as pt lives with elderly spouse who cannot physically assist.

## 2020-04-14 NOTE — PLAN OF CARE
Problem: Patient Care Overview  Goal: Plan of Care Review  Flowsheets  Taken 4/13/2020 1430 by Last Pérez, PT  Progress: improving  Outcome Summary: PT eval complete. Pt ambulated 60 feet using RW, chair follow, and CGA. Gait limited by pain and fatigue. STS requiring mod A. Reviewed HEP and pt educated on WBAT status. Will continue to progress strength and mobility as able. Recommend pt d/c to inpatient rehabilitation facility.  Taken 4/13/2020 2010 by Gadiel Franklin RN  Plan of Care Reviewed With: patient  Note:   VSS.  Pt. Ambulated 50ft this shift.  C/o pain at time of ambulation, but once pain medication pulled pt refused to take.  Neurovascular checks wdl.  Nerve block running at 6.  Dx cdi.       Problem: Fall Risk (Adult)  Goal: Identify Related Risk Factors and Signs and Symptoms  Flowsheets (Taken 4/14/2020 5655)  Related Risk Factors (Fall Risk): age-related changes; history of falls; gait/mobility problems; environment unfamiliar  Signs and Symptoms (Fall Risk): presence of risk factors

## 2020-04-15 LAB
FERRITIN SERPL-MCNC: 132.2 NG/ML (ref 13–150)
FOLATE SERPL-MCNC: 15.9 NG/ML (ref 4.78–24.2)
HCT VFR BLD AUTO: 21.9 % (ref 34–46.6)
HCT VFR BLD AUTO: 22 % (ref 34–46.6)
HCT VFR BLD AUTO: 23.9 % (ref 34–46.6)
HCT VFR BLD AUTO: 24.1 % (ref 34–46.6)
HGB BLD-MCNC: 7 G/DL (ref 12–15.9)
HGB BLD-MCNC: 7.1 G/DL (ref 12–15.9)
HGB BLD-MCNC: 7.8 G/DL (ref 12–15.9)
HGB BLD-MCNC: 7.9 G/DL (ref 12–15.9)
IRON 24H UR-MRATE: 18 MCG/DL (ref 37–145)
IRON SATN MFR SERPL: 9 % (ref 20–50)
RETICS # AUTO: 0.04 10*6/MM3 (ref 0.02–0.13)
RETICS/RBC NFR AUTO: 1.85 % (ref 0.7–1.9)
TIBC SERPL-MCNC: 192 MCG/DL (ref 298–536)
TRANSFERRIN SERPL-MCNC: 129 MG/DL (ref 200–360)
VIT B12 BLD-MCNC: 851 PG/ML (ref 211–946)

## 2020-04-15 PROCEDURE — 85014 HEMATOCRIT: CPT | Performed by: INTERNAL MEDICINE

## 2020-04-15 PROCEDURE — 82728 ASSAY OF FERRITIN: CPT | Performed by: INTERNAL MEDICINE

## 2020-04-15 PROCEDURE — 97116 GAIT TRAINING THERAPY: CPT

## 2020-04-15 PROCEDURE — 97110 THERAPEUTIC EXERCISES: CPT

## 2020-04-15 PROCEDURE — 85018 HEMOGLOBIN: CPT | Performed by: INTERNAL MEDICINE

## 2020-04-15 PROCEDURE — 85045 AUTOMATED RETICULOCYTE COUNT: CPT | Performed by: INTERNAL MEDICINE

## 2020-04-15 PROCEDURE — 83540 ASSAY OF IRON: CPT | Performed by: INTERNAL MEDICINE

## 2020-04-15 PROCEDURE — 97535 SELF CARE MNGMENT TRAINING: CPT

## 2020-04-15 PROCEDURE — 82746 ASSAY OF FOLIC ACID SERUM: CPT | Performed by: INTERNAL MEDICINE

## 2020-04-15 PROCEDURE — 84466 ASSAY OF TRANSFERRIN: CPT | Performed by: INTERNAL MEDICINE

## 2020-04-15 PROCEDURE — 82607 VITAMIN B-12: CPT | Performed by: INTERNAL MEDICINE

## 2020-04-15 PROCEDURE — 99232 SBSQ HOSP IP/OBS MODERATE 35: CPT | Performed by: INTERNAL MEDICINE

## 2020-04-15 PROCEDURE — 25010000002 NA FERRIC GLUC CPLX PER 12.5 MG: Performed by: INTERNAL MEDICINE

## 2020-04-15 RX ORDER — DOCUSATE SODIUM 100 MG/1
100 CAPSULE, LIQUID FILLED ORAL 2 TIMES DAILY PRN
Status: DISCONTINUED | OUTPATIENT
Start: 2020-04-15 | End: 2020-04-16 | Stop reason: HOSPADM

## 2020-04-15 RX ADMIN — POLYETHYLENE GLYCOL 3350 17 G: 17 POWDER, FOR SOLUTION ORAL at 08:28

## 2020-04-15 RX ADMIN — CYANOCOBALAMIN TAB 1000 MCG 1000 MCG: 1000 TAB at 08:27

## 2020-04-15 RX ADMIN — SODIUM CHLORIDE 125 MG: 9 INJECTION, SOLUTION INTRAVENOUS at 14:36

## 2020-04-15 RX ADMIN — PANTOPRAZOLE SODIUM 40 MG: 40 TABLET, DELAYED RELEASE ORAL at 08:27

## 2020-04-15 RX ADMIN — ACETAMINOPHEN 650 MG: 325 TABLET, FILM COATED ORAL at 08:27

## 2020-04-15 RX ADMIN — DOCUSATE SODIUM 100 MG: 100 CAPSULE, LIQUID FILLED ORAL at 08:27

## 2020-04-15 RX ADMIN — ACETAMINOPHEN 650 MG: 325 TABLET, FILM COATED ORAL at 18:25

## 2020-04-15 NOTE — PLAN OF CARE
Problem: Patient Care Overview  Goal: Plan of Care Review  Flowsheets (Taken 4/15/2020 6333)  Outcome Summary: Pt alert, Ox4 and motivated to work with OT. Min A STS and commode transfers. CGA in-room mobility. Education reinforced for use of AE to increase ADL independence with good effort in return demonstration. Mod A LB dressing/toileting today. OT will continue to follow IP. Plan is IRF at d/c.

## 2020-04-15 NOTE — PLAN OF CARE
Problem: Patient Care Overview  Goal: Plan of Care Review  Flowsheets (Taken 4/15/2020 0955)  Progress: improving  Plan of Care Reviewed With: patient  Outcome Summary: Pt increased ambulation distance to 100 feet using RW and min A for AD management and balance. Gait limited by pain and fatigue. Bed mobility requiring mod A and STS performed with CGA. Reviewed HEP and WB status. Will continue to progress strength and mobility as able.

## 2020-04-15 NOTE — THERAPY TREATMENT NOTE
Acute Care - Occupational Therapy Treatment Note  [facility]     Patient Name: Prisca Ellis  : 1945  MRN: [id number]  Today's Date: 4/15/2020  Onset of Illness/Injury or Date of Surgery: 20  Date of Referral to OT: 20  Referring Physician: [place]    Admit Date: 2020       ICD-10-CM ICD-9-CM   1. Closed fracture of right hip, initial encounter (CMS/[facility]) S72.001A 820.8   2. Impaired mobility and ADLs Z74.09 799.89     Patient Active Problem List   Diagnosis   • Small bowel obstruction (CMS/[facility])   • Closed fracture of right hip (CMS/[facility])   • Tachycardia   • GERD without esophagitis   • Osteoporosis with fracture     Past Medical History:   Diagnosis Date   • Osteoporosis    • Ulcer      Past Surgical History:   Procedure Laterality Date   • COLONOSCOPY     • EXPLORATORY LAPAROTOMY N/A 2017    Procedure: LAPAROTOMY EXPLORATORY WITH LYSIS OF ADHESIONS; REDUCTION OF HERNIA  ;  Surgeon: Sami Griffiths MD;  Location: [facility];  Service:    • HIP TROCHANTERIC NAILING WITH INTRAMEDULLARY HIP SCREW Right 2020    Procedure: HIP TROCHANTERIC NAILING WITH INTRAMEDULLARY HIP SCREW;  Surgeon: Kash Akins Jr., MD;  [facility];  Service: Orthopedics;  Laterality: Right;   • TUBAL ABDOMINAL LIGATION         Therapy Treatment    Rehabilitation Treatment Summary     Row Name 04/15/20 1335             Treatment Time/Intention    Discipline  occupational therapist  -TB      Document Type  therapy note (daily note)  -TB      Subjective Information  complains of;weakness;pain  -TB      Mode of Treatment  individual therapy  -TB      Patient Effort  good  -TB      Existing Precautions/Restrictions  fall  -TB      Recorded by [TB] Rosario Lr, OT 04/15/20 1455      Row Name 04/15/20 1335             Vital Signs    Pre Systolic BP Rehab  -- RN cleared OT; possible transfusion today  -TB      O2 Delivery Post Treatment  room air  -TB      Pre Patient Position  Sitting  -TB       Intra Patient Position  Standing  -TB      Post Patient Position  Supine  -TB      Recorded by [TB] Rosario Lr, OT 04/15/20 1455      Row Name 04/15/20 1339             Cognitive Assessment/Intervention- PT/OT    Affect/Mental Status (Cognitive)  WFL  -TB      Orientation Status (Cognition)  oriented x 4  -TB      Follows Commands (Cognition)  WFL  -TB      Safety Deficit (Cognitive)  mild deficit  -TB      Recorded by [TB] Rosario Lr, OT 04/15/20 1455      Row Name 04/15/20 1339             Safety Issues, Functional Mobility    Safety Issues Affecting Function (Mobility)  safety precaution awareness;safety precautions follow-through/compliance  -TB      Impairments Affecting Function (Mobility)  balance;endurance/activity tolerance;pain;range of motion (ROM);strength  -TB      Recorded by [TB] Rosario Lr, OT 04/15/20 1455      Row Name 04/15/20 1337             Mobility Assessment/Intervention    Extremity Weight-bearing Status  right lower extremity  -TB      Right Lower Extremity (Weight-bearing Status)  weight-bearing as tolerated (WBAT)  -TB      Recorded by [TB] Rosario Lr, OT 04/15/20 1455      Row Name 04/15/20 1335             Bed Mobility Assessment/Treatment    Bed Mobility Assessment/Treatment  sit-supine  -TB      Sit-Supine [place] (Bed Mobility)  moderate assist (50% patient effort);verbal cues  -TB      Bed Mobility, Safety Issues  decreased use of legs for bridging/pushing  -TB      Assistive Device (Bed Mobility)  head of bed elevated;bed rails;leg   -TB      Comment (Bed Mobility)  Education and cues for sequencing with AE  -TB      Recorded by [TB] Rosario Lr OT 04/15/20 1455      Row Name 04/15/20 1333             Functional Mobility    Functional Mobility- Ind. Level  contact guard assist  -TB      Functional Mobility- Device  rolling walker  -TB      Functional Mobility-Distance (Feet)  30  -TB      Functional Mobility-  Safety Issues  step length decreased;weight-shifting ability decreased;balance decreased during turns  -TB      Functional Mobility- Comment  encouragement and increased time  -TB      Recorded by [TB] Rosario Lr, OT 04/15/20 1455      Row Name 04/15/20 1334             Transfer Assessment/Treatment    Transfer Assessment/Treatment  sit-stand transfer;stand-sit transfer;chair-bed transfer;toilet transfer  -TB      Comment (Transfers)  cues for hand placement, sequencing and safety  -TB      Recorded by [TB] Rosario Lr OT 04/15/20 1455      Row Name 04/15/20 1337             Chair-Bed Transfer    Chair-Bed [place] (Transfers)  contact guard;verbal cues  -TB      Assistive Device (Chair-Bed Transfers)  walker, front-wheeled  -TB      Recorded by [TB] Rosario Lr,  04/15/20 1455      Row Name 04/15/20 1335             Sit-Stand Transfer    Sit-Stand [place] (Transfers)  minimum assist (75% patient effort);verbal cues  -TB      Assistive Device (Sit-Stand Transfers)  walker, front-wheeled  -TB      Recorded by [TB] Rosario Lr,  04/15/20 1455      Row Name 04/15/20 1339             Stand-Sit Transfer    Stand-Sit [place] (Transfers)  contact guard;verbal cues  -TB      Assistive Device (Stand-Sit Transfers)  walker, front-wheeled  -TB      Recorded by [TB] Rosario Lr, OT 04/15/20 1455      Row Name 04/15/20 1331             Toilet Transfer    Type (Toilet Transfer)  sit-stand;stand-sit  -TB      [place] Level (Toilet Transfer)  minimum assist (75% patient effort);verbal cues  -TB      Assistive Device (Toilet Transfer)  grab bars/safety frame;raised toilet seat;walker, front-wheeled  -TB      Recorded by [TB] Rosario Lr  04/15/20 1455      Row Name 04/15/20 1337             ADL Assessment/Intervention    55542 - OT Self Care/Mgmt Minutes  30  -TB      BADL Assessment/Intervention  lower body  dressing;grooming;toileting;feeding;upper body dressing  -TB      Recorded by [TB] Rosario Lr, OT 04/15/20 1455      Row Name 04/15/20 1335             Bathing Assessment/Intervention    Bathing [place] Level  lower body;bathing skills  -TB      Assistive Devices (Bathing)  long-handled sponge  -TB      Comment (Bathing)  Education reinforced for use of AE to increase ADL independence  -TB      Recorded by [TB] Rosario Lr, OT 04/15/20 1455      Row Name 04/15/20 1335             Upper Body Dressing Assessment/Training    Upper Body Dressing [place] Level  don;doff;pajama/robe;minimum assist (75% patient effort)  -TB      Upper Body Dressing Position  unsupported sitting  -TB      Comment (Upper Body Dressing)  assist for lines  -TB      Recorded by [TB] Rosario Lr, OT 04/15/20 1455      Row Name 04/15/20 133             Lower Body Dressing Assessment/Training    Lower Body Dressing [place] Level  doff;don;socks;undergarment;moderate assist (50% patient effort);verbal cues  -TB      Assistive Devices (Lower Body Dressing)  reacher;sock-aid  -TB      Lower Body Dressing Position  unsupported sitting  -TB      Comment (Lower Body Dressing)  Education reinforced for use of AE to increase ADL independence; good effort, reinforcement needed  -TB      Recorded by [TB] Rosario Lr, OT 04/15/20 1455      Row Name 04/15/20 1335             Grooming Assessment/Training    [place] Level (Grooming)  contact guard assist to wash/dry hands sink side  -TB      Grooming Position  sink side  -TB      Comment (Grooming)  education/cues for RW positioning at sink side  -TB      Recorded by [TB] Rosario Lr, OT 04/15/20 1455      Row Name 04/15/20 1335             Self-Feeding Assessment/Training    [place] Level (Feeding)  set up;liquids to mouth  -TB      Position (Self-Feeding)  supported sitting mid/high fowlers  -TB      Recorded by [TB] Be  Rosario Montalvo,  04/15/20 1455      Row Name 04/15/20 1335             Toileting Assessment/Training    [place] Level (Toileting)  adjust/manage clothing;maximum assist (25% patient effort);set up;perform perineal hygiene  -TB      Assistive Devices (Toileting)  grab bar/safety frame;raised toilet seat  -TB      Recorded by [TB] Rosario Lr, OT 04/15/20 1455      Row Name 04/15/20 1335             BADL Safety/Performance    Impairments, BADL Safety/Performance  balance;endurance/activity tolerance;pain;range of motion;strength  -TB      Skilled BADL Treatment/Intervention  BADL process/adaptation training;adaptive equipment training  -TB      Recorded by [TB] Rosario Lr,  04/15/20 1455      Row Name 04/15/20 1330             Motor Skills Assessment/Interventions    Additional Documentation  Balance (Group)  -TB      Recorded by [TB] Rosario Lr,  04/15/20 1455      Row Name 04/15/20 1335             Balance    Balance  dynamic sitting balance;dynamic standing balance  -TB      Recorded by [TB] Rosario Lr, OT 04/15/20 1455      Row Name 04/15/20 1335             Dynamic Sitting Balance    Level of [place], Reaches Outside Midline (Sitting, Dynamic Balance)  standby assist  -TB      Sitting Position, Reaches Outside Midline (Sitting, Dynamic Balance)  sitting in chair;sitting on edge of bed commode  -TB      Comment, Reaches Outside Midline (Sitting, Dynamic Balance)  ADL tasks/AE teaching and trial  -TB      Recorded by [TB] Rosario Lr  04/15/20 1455      Row Name 04/15/20 1335             Dynamic Standing Balance    Level of [place], Reaches Outside Midline (Standing, Dynamic Balance)  contact guard assist  -TB      Time Able to Maintain Position, Reaches Outside Midline (Standing, Dynamic Balance)  3 to 4 minutes  -TB      Assistive Device Utilized (Supported Standing, Dynamic Balance)  walker, rolling  -TB      Comment, Reaches  "Outside Midline (Standing, Dynamic Balance)  in-roomm mobility and transfer training  -TB      Recorded by [TB] Rosario Lr, OT 04/15/20 1455      Row Name 04/15/20 133             Positioning and Restraints    Pre-Treatment Position  sitting in chair/recliner  -TB      Post Treatment Position  bed  -TB      In Bed  notified nsg;fowlers;call light within reach;encouraged to call for assist;exit alarm on  -TB      Recorded by [TB] Rosario Lr, OT 04/15/20 1455      Row Name 04/15/20 1333             Pain Assessment    Additional Documentation  Pain Scale: Word Pre/Post-Treatment (Group)  -TB      Recorded by [TB] Rosario Lr, OT 04/15/20 1455      Row Name 04/15/20 3311             Pain Scale: Numbers Pre/Post-Treatment    Pain Location - Side  Right  -TB      Pain Location - Orientation  lower  -TB      Pain Location  extremity  -TB      Pre/Post Treatment Pain Comment  \"I'm stiff and hurting today\"  -TB      Pain Intervention(s)  Ambulation/increased activity;Repositioned  -TB      Recorded by [TB] Rosario Lr, OT 04/15/20 1455      Row Name 04/15/20 3446             Pain Scale: Word Pre/Post-Treatment    Pain: Word Scale, Pretreatment  2 - mild pain  -TB      Pain: Word Scale, Post-Treatment  4 - moderate pain  -TB      Pre/Post Treatment Pain Comment  Increased pain with activity/mobility  -TB      Recorded by [TB] Rosario Lr, OT 04/15/20 1456      Row Name                Wound Right lateral greater trochanter    Wound - Properties Group Side: Right [RB] Orientation: lateral [RB2] Location: greater trochanter [RB] Recorded by:  [RB] Roxy Crook RN 04/12/20 1650 [RB2] Roxy Crook RN 04/12/20 1651    Row Name 04/15/20 1335             Coping    Observed Emotional State  cooperative  -TB      Verbalized Emotional State  acceptance  -TB      Recorded by [TB] Rosario Lr, OT 04/15/20 1455      Row Name 04/15/20 1339             Plan of Care " Review    Plan of Care Reviewed With  patient  -TB      Progress  improving  -TB      Recorded by [TB] Rosario Lr, OT 04/15/20 1455      Row Name 04/15/20 1335             Outcome Summary/Treatment Plan (OT)    Daily Summary of Progress (OT)  progress toward functional goals as expected  -TB      Barriers to Overall Progress (OT)  pain, strength, endurance (anemia)  -TB      Plan for Continued Treatment (OT)  per POC  -TB      Anticipated Discharge Disposition (OT)  inpatient rehabilitation facility  -TB      Recorded by [TB] Rosario Lr, OT 04/15/20 1458        User Key  (r) = Recorded By, (t) = Taken By, (c) = Cosigned By    Initials Name Effective Dates Discipline    TB Rosario Lr, PAUL 06/08/18 -  OT    Roxy Borrero RN 07/02/19 -  Nurse        Wound Right lateral greater trochanter (Active)   Dressing Appearance dried drainage;dry;intact 4/15/2020  8:27 AM   Closure Liquid skin adhesive 4/14/2020  8:05 PM           OT Recommendation and Plan  Outcome Summary/Treatment Plan (OT)  Daily Summary of Progress (OT): progress toward functional goals as expected  Barriers to Overall Progress (OT): pain, strength, endurance (anemia)  Plan for Continued Treatment (OT): per POC  Anticipated Equipment Needs at Discharge (OT): bedside commode, shower chair(Education initiated for recommended DME)  Anticipated Discharge Disposition (OT): inpatient rehabilitation facility  Planned Therapy Interventions (OT Eval): transfer/mobility retraining, occupation/activity based interventions, BADL retraining, adaptive equipment training  Therapy Frequency (OT Eval): daily  Daily Summary of Progress (OT): progress toward functional goals as expected  Plan of Care Review  Plan of Care Reviewed With: patient  Plan of Care Reviewed With: patient  Outcome Summary: Pt alert, Ox4 and motivated to work with OT. Min A STS and commode transfers. CGA in-room mobility. Education reinforced for use of AE to  increase ADL independence with good effort in return demonstration. Mod A LB dressing/toileting today. OT will continue to follow IP. Plan is IRF at d/c.  Outcome Measures     Row Name 04/15/20 1335 04/14/20 1500 04/13/20 1300       How much help from another is currently needed...    Putting on and taking off regular lower body clothing?  2  -TB  2  -AR  2  -TB    Bathing (including washing, rinsing, and drying)  2  -TB  2  -AR  2  -TB    Toileting (which includes using toilet bed pan or urinal)  2  -TB  2  -AR  2  -TB    Putting on and taking off regular upper body clothing  3  -TB  3  -AR  3  -TB    Taking care of personal grooming (such as brushing teeth)  3  -TB  3  -AR  3  -TB    Eating meals  4  -TB  3  -AR  4  -TB    AM-PAC 6 Clicks Score (OT)  16  -TB  15  -AR  16  -TB       Functional Assessment    Outcome Measure Options  AM-PAC 6 Clicks Daily Activity (OT)  -TB  AM-PAC 6 Clicks Daily Activity (OT)  -AR  AM-PAC 6 Clicks Daily Activity (OT)  -TB      User Key  (r) = Recorded By, (t) = Taken By, (c) = Cosigned By    Initials Name Provider Type    TB Rosario Lr OT Occupational Therapist    Nissa Agarwal OT Occupational Therapist           Time Calculation:   Time Calculation- OT     Row Name 04/15/20 1459 04/15/20 1335 04/15/20 0955       Time Calculation- OT    OT Start Time  1335  -TB  --  --    OT Received On  04/15/20  -TB  --  --    OT Goal Re-Cert Due Date  04/23/20  -TB  --  --       Timed Charges    58281 - Gait Training Minutes   --  --  14  -CLAUDETTE    39315 - OT Self Care/Mgmt Minutes  --  30  -TB  --      User Key  (r) = Recorded By, (t) = Taken By, (c) = Cosigned By    Initials Name Provider Type    Rosario Campos OT Occupational Therapist    Last Navarro, PT Physical Therapist        Therapy Charges for Today     Code Description Service Date Service Provider Modifiers Qty    [id number] HC OT SELF CARE/MGMT/TRAIN EA 15 MIN 4/15/2020 Rosario Lr OT GO  2               Rosario Lr, OT  4/15/2020

## 2020-04-15 NOTE — PROGRESS NOTES
Continued Stay Note  [facility]     Patient Name: Prisca Ellis  MRN: [id number]  Today's Date: 4/15/2020    Admit Date: 4/12/2020    Discharge Plan     Row Name 04/15/20 1442       Plan    Plan  [facility]    Plan Comments  Dr. Burrell tells me not medically ready for today and plans transfer to [facility] tomorrow. I have updated Rhett Aida with [facility]. I updated patient on plans.     Final Discharge Disposition Code  03 - skilled nursing facility (SNF)        Discharge Codes    No documentation.             Marisela Glass RN

## 2020-04-15 NOTE — PROGRESS NOTES
"Orthopedic Daily Progress Note      CC: NICOLA overnight. Patient doing well with PT. Possibly will receive PRBCs for anemia today, which has delayed her tx to [facility].    Pain well controlled  General: no fevers, chills  Abdomen: no nausea, vomiting, or diarrhea    No other complaints    Physical Exam:  I have reviewed the vital signs.  Temp:  [97.3 °F (36.3 °C)-98.1 °F (36.7 °C)] 97.3 °F (36.3 °C)  Heart Rate:  [71-88] 88  Resp:  [14-16] 14  BP: ()/(37-52) 105/45    Objective:  Vital signs: (most recent): Blood pressure 105/45, pulse 88, temperature 97.3 °F (36.3 °C), temperature source Oral, resp. rate 14, height 152.4 cm (60\"), weight 51.7 kg (114 lb), SpO2 99 %.            General Appearance:    Alert, cooperative, no distress  Extremities: No clubbing, cyanosis, or edema to lower extremities  Pulses:  2+ in distal surgical extremity  Skin: Dressing Clean/dry/intact      Results Review:    I have reviewed the labs, radiology results and diagnostic studies:Hgb 7.1 this AM    Results from last 7 days   Lab Units 04/15/20  0530  04/13/20  0522   WBC 10*3/mm3  --   --  6.68   HEMOGLOBIN g/dL 7.1*   < > 7.7*   PLATELETS 10*3/mm3  --   --  234    < > = values in this interval not displayed.     Results from last 7 days   Lab Units 04/13/20  0522   SODIUM mmol/L 139   POTASSIUM mmol/L 4.3   CO2 mmol/L 26.0   CREATININE mg/dL 0.88   GLUCOSE mg/dL 123*       I have reviewed the medications.    Assessment/Problem List  POD# 3 Day Post-Op   S/p R TFN  Post op blood loss anemia    Plan  WBAT RLE  PT/OT  DVT ppx with Lovenox 40 mg sq daily x 2 weeks followed by  mg daily x 4 weeks        Discharge Planning: I expect patient to be discharged to in rehab/[facility] in 0-2 days.    Kash Akins Jr., MD  04/15/20  12:04            "

## 2020-04-15 NOTE — PLAN OF CARE
Problem: Patient Care Overview  Goal: Plan of Care Review  Flowsheets  Taken 4/14/2020 1500 by Nissa De La O, OT  Progress: improving  Taken 4/14/2020 2005 by Gadiel Franklin RN  Plan of Care Reviewed With: patient  Taken 4/14/2020 1531 by Nissa De La O, OT  Outcome Summary: pt alert, Ox4. She completed toilet transfer with min assist, post-toilet hygiene with mod assist, UB dressing with min assist, LB dressing with min assist with AE and bed mobility with mod assist. Recommend IPR as pt lives with elderly spouse who cannot physically assist.  Note:   VSS.  BP continues to run low.  Pt non symptomatic.   O2 had to be placed on pt while sleeping.  Ambulated 150ft in [place]ultz at beginning of shift.   No c/o pain.  Resting quietly this am.  Will continue to monitor.

## 2020-04-15 NOTE — THERAPY TREATMENT NOTE
Patient Name: Prisca Ellis  : 1945    MRN: [id number]                              Today's Date: 4/15/2020       Admit Date: 2020    Visit Dx:     ICD-10-CM ICD-9-CM   1. Closed fracture of right hip, initial encounter (CMS/[facility]) S72.001A 820.8   2. Impaired mobility and ADLs Z74.09 799.89     Patient Active Problem List   Diagnosis   • Small bowel obstruction (CMS/[facility])   • Closed fracture of right hip (CMS/[facility])   • Tachycardia   • GERD without esophagitis   • Osteoporosis with fracture     Past Medical History:   Diagnosis Date   • Osteoporosis    • Ulcer      Past Surgical History:   Procedure Laterality Date   • COLONOSCOPY     • EXPLORATORY LAPAROTOMY N/A 2017    Procedure: LAPAROTOMY EXPLORATORY WITH LYSIS OF ADHESIONS; REDUCTION OF HERNIA  ;  Surgeon: Sami Griffiths MD;  Location: [facility];  Service:    • HIP TROCHANTERIC NAILING WITH INTRAMEDULLARY HIP SCREW Right 2020    Procedure: HIP TROCHANTERIC NAILING WITH INTRAMEDULLARY HIP SCREW;  Surgeon: Kash Akins Jr., MD;  Location: [facility];  Service: Orthopedics;  Laterality: Right;   • TUBAL ABDOMINAL LIGATION       General Information     Row Name 04/15/20 0955          PT Evaluation Time/Intention    Document Type  therapy note (daily note)  -     Mode of Treatment  physical therapy;individual therapy  -     Row Name 04/15/20 0955          General Information    Patient Profile Reviewed?  yes  -CLAUDETTE     Existing Precautions/Restrictions  fall  -CLAUDETTE     Row Name 04/15/20 0955          Cognitive Assessment/Intervention- PT/OT    Orientation Status (Cognition)  oriented x 4  -CLAUDETTE     Row Name 04/15/20 0955          Safety Issues, Functional Mobility    Safety Issues Affecting Function (Mobility)  safety precautions follow-through/compliance;safety precaution awareness  -     Impairments Affecting Function (Mobility)  endurance/activity tolerance;pain;range of motion (ROM);strength  -CLAUDETTE       User Key  (r) = Recorded By, (t) =  Taken By, (c) = Cosigned By    Initials Name Provider Type    CLAUDETTE Last Pérez, PT Physical Therapist        Mobility     Row Name 04/15/20 0955          Bed Mobility Assessment/Treatment    Bed Mobility Assessment/Treatment  supine-sit;scooting/bridging  -CLAUDETTE     Scooting/Bridging [place] (Bed Mobility)  contact guard;verbal cues  -     Supine-Sit [place] (Bed Mobility)  verbal cues;moderate assist (50% patient effort)  -CLAUDETTE     Assistive Device (Bed Mobility)  bed rails;draw sheet  -CLAUDETTE     Comment (Bed Mobility)  Verbal cues for LE sequencing off of EOB and use of UEs to push trunk into sitting; mod A for LE/trunk management  -     Row Name 04/15/20 0955          Transfer Assessment/Treatment    Comment (Transfers)  Verbal cues for safe hand placement during standing/sitting and moving R LE out for comfort prior to sitting; pt assisted to bathroom, requiring CGA for toilet transfer  -     Row Name 04/15/20 0955          Sit-Stand Transfer    Sit-Stand [place] (Transfers)  verbal cues;contact guard  -     Assistive Device (Sit-Stand Transfers)  walker, front-wheeled  -CLAUDETTE     Row Name 04/15/20 0955          Gait/Stairs Assessment/Training    Gait/Stairs Assessment/Training  gait/ambulation independence;gait/ambulation assistive device  -     [place] Level (Gait)  verbal cues;minimum assist (75% patient effort)  -CLAUDETTE     Assistive Device (Gait)  walker, front-wheeled  -     Distance in Feet (Gait)  100 feet  -     Pattern (Gait)  step-through  -CLAUDETTE     Deviations/Abnormal Patterns (Gait)  right sided deviations;antalgic;gait speed decreased;stride length decreased;bilateral deviations;kimmy decreased;base of support, narrow  -CLAUDETTE     Bilateral Gait Deviations  forward flexed posture;weight shift ability decreased  -CLAUDETTE     Right Sided Gait Deviations  weight shift ability decreased;heel strike decreased  -     [place] Level (Stairs)  not tested  -CLAUDETTE     Comment (Gait/Stairs)  Pt  ambulated with step through pattern and decreased speed. Verbal cues for increasing step length, MIYA, and WB on LEs, and maintain upright posture and body within walker throughout. Min A for AD management. Gait limited by pain and fatigue.   -     Row Name 04/15/20 0955          Mobility Assessment/Intervention    Extremity Weight-bearing Status  right lower extremity  -     Right Lower Extremity (Weight-bearing Status)  weight-bearing as tolerated (WBAT)  -       User Key  (r) = Recorded By, (t) = Taken By, (c) = Cosigned By    Initials Name Provider Type    Last Navarro, PT Physical Therapist        Obj/Interventions     Row Name 04/15/20 0955          Therapeutic Exercise    Lower Extremity (Therapeutic Exercise)  gluteal sets;quad sets, bilateral;SLR (straight leg raise), right;LAQ (long arc quad), right;heel slides, right  -CLAUDETTE     Lower Extremity Range of Motion (Therapeutic Exercise)  ankle dorsiflexion/plantar flexion, bilateral;hip abduction/adduction, right  -CLAUDETTE     Exercise Type (Therapeutic Exercise)  AROM (active range of motion);AAROM (active assistive range of motion);isometric contraction, static AAROM for SLR and hip ABD  -CLAUDETTE     Position (Therapeutic Exercise)  seated  -     Sets/Reps (Therapeutic Exercise)  15x each  -     Expected Outcome (Therapeutic Exercise)  facilitate normal movement patterns;improve functional tolerance, self-care activity  -     Comment (Therapeutic Exercise)  Cues for technique  -     Row Name 04/15/20 0955          Static Sitting Balance    Level of [place] (Unsupported Sitting, Static Balance)  supervision  -     Sitting Position (Unsupported Sitting, Static Balance)  sitting on edge of bed  -     Time Able to Maintain Position (Unsupported Sitting, Static Balance)  2 to 3 minutes  -     Row Name 04/15/20 0955          Static Standing Balance    Level of [place] (Supported Standing, Static Balance)  contact guard assist  -     Time Able  to Maintain Position (Supported Standing, Static Balance)  1 to 2 minutes  -     Assistive Device Utilized (Supported Standing, Static Balance)  walker, rolling  -CLAUDETTE     Row Name 04/15/20 0955          Dynamic Standing Balance    Level of [place], Reaches Outside Midline (Standing, Dynamic Balance)  minimal assist, 75% patient effort  -     Time Able to Maintain Position, Reaches Outside Midline (Standing, Dynamic Balance)  more than 5 minutes  -     Assistive Device Utilized (Supported Standing, Dynamic Balance)  walker, rolling  -       User Key  (r) = Recorded By, (t) = Taken By, (c) = Cosigned By    Initials Name Provider Type    Last Navarro, PT Physical Therapist        Goals/Plan    No documentation.       Clinical Impression     Row Name 04/15/20 0955          Pain Assessment    Additional Documentation  Pain Scale: Numbers Pre/Post-Treatment (Group)  -CLAUDETTE     Row Name 04/15/20 0955          Pain Scale: Numbers Pre/Post-Treatment    Pain Scale: Numbers, Pretreatment  6/10  -CLAUDETTE     Pain Scale: Numbers, Post-Treatment  7/10  -CLAUDETTE     Pain Location - Side  Right  -CLAUDETTE     Pain Location  hip  -CLAUDETTE     Pain Intervention(s)  Repositioned;Ambulation/increased activity;Cold applied  -CLAUDETTE     Row Name 04/15/20 0955          Physical Therapy Clinical Impression    Criteria for Skilled Interventions Met (PT Clinical Impression)  yes;treatment indicated  -     Rehab Potential (PT Clinical Summary)  good, to achieve stated therapy goals  -CLAUDETTE     Row Name 04/15/20 0955          Positioning and Restraints    Pre-Treatment Position  in bed  -     Post Treatment Position  chair  -CLAUDETTE     In Chair  notified nsg;reclined;call light within reach;encouraged to call for assist;exit alarm on;legs elevated;waffle cushion  -       User Key  (r) = Recorded By, (t) = Taken By, (c) = Cosigned By    Initials Name Provider Type    Last Navarro, PT Physical Therapist        Outcome Measures     Row Name 04/15/20 0955           How much help from another person do you currently need...    Turning from your back to your side while in flat bed without using bedrails?  4  -CLAUDETTE     Moving from lying on back to sitting on the side of a flat bed without bedrails?  2  -CLAUDETTE     Moving to and from a bed to a chair (including a wheelchair)?  3  -CLAUDETTE     Standing up from a chair using your arms (e.g., wheelchair, bedside chair)?  3  -CLAUDETTE     Climbing 3-5 steps with a railing?  2  -CLAUDETTE     To walk in hospital room?  3  -CLAUDETTE     AM-PAC 6 Clicks Score (PT)  17  -     Row Name 04/15/20 0955          Functional Assessment    Outcome Measure Options  AM-PAC 6 Clicks Basic Mobility (PT)  -       User Key  (r) = Recorded By, (t) = Taken By, (c) = Cosigned By    Initials Name Provider Type    Last Navarro, PT Physical Therapist          PT Recommendation and Plan  Planned Therapy Interventions (PT Eval): balance training, bed mobility training, gait training, home exercise program, patient/family education, strengthening, transfer training  Outcome Summary/Treatment Plan (PT)  Anticipated Equipment Needs at Discharge (PT): standard cane, front wheeled walker  Anticipated Discharge Disposition (PT): inpatient rehabilitation facility  Plan of Care Reviewed With: patient  Progress: improving  Outcome Summary: Pt increased ambulation distance to 100 feet using RW and min A for AD management and balance. Gait limited by pain and fatigue. Bed mobility requiring mod A and STS performed with CGA. Reviewed HEP and WB status. Will continue to progress strength and mobility as able.     Time Calculation:   PT Charges     Row Name 04/15/20 0955             Time Calculation    Start Time  0955  -CLAUDETTE      PT Received On  04/15/20  -      PT Goal Re-Cert Due Date  04/23/20  -         Time Calculation- PT    Total Timed Code Minutes- PT  24 minute(s)  -         Timed Charges    12672 - PT Therapeutic Exercise Minutes  10  -      08666 - Gait Training Minutes   14   -CLAUDETTE        User Key  (r) = Recorded By, (t) = Taken By, (c) = Cosigned By    Initials Name Provider Type    Last Navarro, PT Physical Therapist        Therapy Charges for Today     Code Description Service Date Service Provider Modifiers Qty    20480016168  PT THER PROC EA 15 MIN 4/15/2020 Last Pérez, PT GP 1    [id number]  GAIT TRAINING EA 15 MIN 4/15/2020 Last Pérez, PT GP 1          PT G-Codes  Outcome Measure Options: AM-PAC 6 Clicks Basic Mobility (PT)  AM-PAC 6 Clicks Score (PT): 17  AM-PAC 6 Clicks Score (OT): 15    Last Pérez PT  4/15/2020

## 2020-04-16 VITALS
OXYGEN SATURATION: 96 % | RESPIRATION RATE: 16 BRPM | HEART RATE: 84 BPM | TEMPERATURE: 98.1 F | BODY MASS INDEX: 22.38 KG/M2 | WEIGHT: 114 LBS | HEIGHT: 60 IN | DIASTOLIC BLOOD PRESSURE: 53 MMHG | SYSTOLIC BLOOD PRESSURE: 106 MMHG

## 2020-04-16 LAB
BH BB BLOOD EXPIRATION DATE: NORMAL
BH BB BLOOD EXPIRATION DATE: NORMAL
BH BB BLOOD TYPE BARCODE: 5100
BH BB BLOOD TYPE BARCODE: 5100
BH BB DISPENSE STATUS: NORMAL
BH BB DISPENSE STATUS: NORMAL
BH BB PRODUCT CODE: NORMAL
BH BB PRODUCT CODE: NORMAL
BH BB UNIT NUMBER: NORMAL
BH BB UNIT NUMBER: NORMAL
CROSSMATCH INTERPRETATION: NORMAL
CROSSMATCH INTERPRETATION: NORMAL
HBA1C MFR BLD: 5.5 % (ref 4.8–5.6)
HCT VFR BLD AUTO: 23.3 % (ref 34–46.6)
HCT VFR BLD AUTO: 24.4 % (ref 34–46.6)
HCT VFR BLD AUTO: 25.6 % (ref 34–46.6)
HGB BLD-MCNC: 7.5 G/DL (ref 12–15.9)
HGB BLD-MCNC: 7.8 G/DL (ref 12–15.9)
HGB BLD-MCNC: 8.1 G/DL (ref 12–15.9)
RETICS # AUTO: 0.07 10*6/MM3 (ref 0.02–0.13)
RETICS/RBC NFR AUTO: 2.66 % (ref 0.7–1.9)
UNIT  ABO: NORMAL
UNIT  ABO: NORMAL
UNIT  RH: NORMAL
UNIT  RH: NORMAL

## 2020-04-16 PROCEDURE — 97116 GAIT TRAINING THERAPY: CPT

## 2020-04-16 PROCEDURE — 83036 HEMOGLOBIN GLYCOSYLATED A1C: CPT | Performed by: INTERNAL MEDICINE

## 2020-04-16 PROCEDURE — 99239 HOSP IP/OBS DSCHRG MGMT >30: CPT | Performed by: INTERNAL MEDICINE

## 2020-04-16 PROCEDURE — 25010000002 NA FERRIC GLUC CPLX PER 12.5 MG: Performed by: INTERNAL MEDICINE

## 2020-04-16 PROCEDURE — 85045 AUTOMATED RETICULOCYTE COUNT: CPT | Performed by: INTERNAL MEDICINE

## 2020-04-16 PROCEDURE — 85018 HEMOGLOBIN: CPT | Performed by: INTERNAL MEDICINE

## 2020-04-16 PROCEDURE — 97110 THERAPEUTIC EXERCISES: CPT

## 2020-04-16 PROCEDURE — 97535 SELF CARE MNGMENT TRAINING: CPT

## 2020-04-16 PROCEDURE — 85014 HEMATOCRIT: CPT | Performed by: INTERNAL MEDICINE

## 2020-04-16 RX ORDER — HYDROCODONE BITARTRATE AND ACETAMINOPHEN 5; 325 MG/1; MG/1
1 TABLET ORAL EVERY 6 HOURS PRN
Status: DISCONTINUED | OUTPATIENT
Start: 2020-04-16 | End: 2020-04-16 | Stop reason: HOSPADM

## 2020-04-16 RX ORDER — ACETAMINOPHEN 325 MG/1
650 TABLET ORAL EVERY 6 HOURS PRN
Start: 2020-04-16 | End: 2022-01-14

## 2020-04-16 RX ORDER — HYDROCODONE BITARTRATE AND ACETAMINOPHEN 5; 325 MG/1; MG/1
1 TABLET ORAL EVERY 6 HOURS PRN
Qty: 12 TABLET | Refills: 0 | Status: SHIPPED | OUTPATIENT
Start: 2020-04-16 | End: 2022-01-14

## 2020-04-16 RX ADMIN — PANTOPRAZOLE SODIUM 40 MG: 40 TABLET, DELAYED RELEASE ORAL at 07:56

## 2020-04-16 RX ADMIN — CYANOCOBALAMIN TAB 1000 MCG 1000 MCG: 1000 TAB at 07:56

## 2020-04-16 RX ADMIN — SODIUM CHLORIDE, PRESERVATIVE FREE 10 ML: 5 INJECTION INTRAVENOUS at 07:56

## 2020-04-16 RX ADMIN — ACETAMINOPHEN 650 MG: 325 TABLET, FILM COATED ORAL at 11:53

## 2020-04-16 RX ADMIN — ACETAMINOPHEN 650 MG: 325 TABLET, FILM COATED ORAL at 07:56

## 2020-04-16 RX ADMIN — SODIUM CHLORIDE 125 MG: 9 INJECTION, SOLUTION INTRAVENOUS at 10:25

## 2020-04-16 NOTE — THERAPY TREATMENT NOTE
Patient Name: Prisca Ellis  : 1945    MRN: [id number]                              Today's Date: 2020       Admit Date: 2020    Visit Dx:     ICD-10-CM ICD-9-CM   1. Closed fracture of right hip, initial encounter (CMS/[facility]) S72.001A 820.8   2. Impaired mobility and ADLs Z74.09 799.89     Patient Active Problem List   Diagnosis   • Small bowel obstruction (CMS/[facility])   • Closed fracture of right hip (CMS/[facility])   • Tachycardia   • GERD without esophagitis   • Osteoporosis with fracture     Past Medical History:   Diagnosis Date   • Osteoporosis    • Ulcer      Past Surgical History:   Procedure Laterality Date   • COLONOSCOPY     • EXPLORATORY LAPAROTOMY N/A 2017    Procedure: LAPAROTOMY EXPLORATORY WITH LYSIS OF ADHESIONS; REDUCTION OF HERNIA  ;  Surgeon: Sami Griffiths MD;  Location: [facility];  Service:    • HIP TROCHANTERIC NAILING WITH INTRAMEDULLARY HIP SCREW Right 2020    Procedure: HIP TROCHANTERIC NAILING WITH INTRAMEDULLARY HIP SCREW;  Surgeon: Kash Akins Jr., MD;  Location: [facility];  Service: Orthopedics;  Laterality: Right;   • TUBAL ABDOMINAL LIGATION       General Information     Row Name 20 1056          PT Evaluation Time/Intention    Document Type  therapy note (daily note)  -SC     Mode of Treatment  individual therapy;physical therapy  -SC     Row Name 20 1056          General Information    Patient Profile Reviewed?  yes  -SC     Existing Precautions/Restrictions  fall  -SC     Row Name 20 1056          Cognitive Assessment/Intervention- PT/OT    Orientation Status (Cognition)  oriented x 4  -SC     Cognitive Assessment/Intervention Comment  alert, following commands  -SC     Row Name 20 1056          Safety Issues, Functional Mobility    Impairments Affecting Function (Mobility)  balance;pain;range of motion (ROM);strength  -SC       User Key  (r) = Recorded By, (t) = Taken By, (c) = Cosigned By    Initials Name Provider Type    SC Didier,  Lance GRIFFITH PT Physical Therapist        Mobility     Row Name 04/16/20 1057          Bed Mobility Assessment/Treatment    Comment (Bed Mobility)  uic  -SC     Row Name 04/16/20 1057          Transfer Assessment/Treatment    Comment (Transfers)  cues for hand placement  -[facility] Name 04/16/20 1057          Sit-Stand Transfer    Sit-Stand [place] (Transfers)  supervision;contact guard  -SC     Assistive Device (Sit-Stand Transfers)  walker, front-wheeled  -[facility] Name 04/16/20 1057          Gait/Stairs Assessment/Training    Gait/Stairs Assessment/Training  gait/ambulation independence  -SC     [place] Level (Gait)  supervision;verbal cues  -SC     Assistive Device (Gait)  walker, front-wheeled  -SC     Distance in Feet (Gait)  280  -SC     Pattern (Gait)  step-through  -SC     Deviations/Abnormal Patterns (Gait)  right sided deviations;antalgic;gait speed decreased;stride length decreased  -SC     Comment (Gait/Stairs)  gait training focused on controling walker, improving upright posture and equal weight shifting. Took x2 standing break. Demonstrated improving control/ Some c/o nausea at end of walk  -[facility] Name 04/16/20 1057          Mobility Assessment/Intervention    Extremity Weight-bearing Status  right lower extremity  -SC     Right Lower Extremity (Weight-bearing Status)  weight-bearing as tolerated (WBAT)  -SC       User Key  (r) = Recorded By, (t) = Taken By, (c) = Cosigned By    Initials Name Provider Type    SC Lance Velásquez PT Physical Therapist        Obj/Interventions     [facility] Name 04/16/20 1102          Therapeutic Exercise    Lower Extremity (Therapeutic Exercise)  gastroc stretch, bilateral;heel slides, right;SAQ (short arc quad), right;quad sets, bilateral;gluteal sets  -SC     Lower Extremity Range of Motion (Therapeutic Exercise)  hip abduction/adduction, right  -SC     Exercise Type (Therapeutic Exercise)  AROM (active range of motion);isometric contraction, static  -SC      Sets/Reps (Therapeutic Exercise)  15  -SC     Expected Outcome (Therapeutic Exercise)  facilitate normal movement patterns;improve functional stability;improve neuromuscular control  -[facility] Name 04/16/20 1102          Dynamic Standing Balance    Level of [place], Reaches Outside Midline (Standing, Dynamic Balance)  supervision  -SC     Time Able to Maintain Position, Reaches Outside Midline (Standing, Dynamic Balance)  more than 5 minutes  -SC     Assistive Device Utilized (Supported Standing, Dynamic Balance)  walker, rolling  -SC       User Key  (r) = Recorded By, (t) = Taken By, (c) = Cosigned By    Initials Name Provider Type    SC Lance Velásquez, PT Physical Therapist        Goals/Plan    No documentation.       Clinical Impression     Row Name 04/16/20 1103          Pain Assessment    Additional Documentation  Pain Scale: FACES Pre/Post-Treatment (Group)  -SC     Row Name 04/16/20 1103          Pain Scale: Numbers Pre/Post-Treatment    Pain Location - Side  Right  -SC     Pain Location  hip  -SC     Pain Intervention(s)  Cold pack  -SC     Row Name 04/16/20 1103          Pain Scale: FACES Pre/Post-Treatment    Pain: FACES Scale, Pretreatment  2-->hurts little bit  -SC     Pain: FACES Scale, Post-Treatment  6-->hurts even more  -SC     Row Name 04/16/20 1103          Plan of Care Review    Plan of Care Reviewed With  patient  -SC     Progress  improving  -SC     Outcome Summary  Patient demonstrated improving motor control when ambulating. SHe was able to ambulate 280 feet today. Some c/o nausea limiting her  -SC     Row Name 04/16/20 1103          Physical Therapy Clinical Impression    Criteria for Skilled Interventions Met (PT Clinical Impression)  yes;treatment indicated  -SC     Rehab Potential (PT Clinical Summary)  good, to achieve stated therapy goals  -SC     Row Name 04/16/20 1103          Positioning and Restraints    Pre-Treatment Position  sitting in chair/recliner  -SC     Post  Treatment Position  chair  -SC     In Chair  notified nsg;reclined;call light within reach  -SC       User Key  (r) = Recorded By, (t) = Taken By, (c) = Cosigned By    Initials Name Provider Type    Lance Mo PT Physical Therapist        Outcome Measures     Row Name 04/16/20 1105          How much help from another person do you currently need...    Turning from your back to your side while in flat bed without using bedrails?  4  -SC     Moving from lying on back to sitting on the side of a flat bed without bedrails?  2  -SC     Moving to and from a bed to a chair (including a wheelchair)?  3  -SC     Standing up from a chair using your arms (e.g., wheelchair, bedside chair)?  3  -SC     Climbing 3-5 steps with a railing?  2  -SC     To walk in hospital room?  3  -SC     AM-PAC 6 Clicks Score (PT)  17  -[facility] Name 04/16/20 1105          Functional Assessment    Outcome Measure Options  AM-PAC 6 Clicks Basic Mobility (PT)  -SC       User Key  (r) = Recorded By, (t) = Taken By, (c) = Cosigned By    Initials Name Provider Type    Lance Mo PT Physical Therapist          PT Recommendation and Plan     Outcome Summary/Treatment Plan (PT)  Anticipated Equipment Needs at Discharge (PT): standard cane, front wheeled walker  Anticipated Discharge Disposition (PT): inpatient rehabilitation facility  Plan of Care Reviewed With: patient  Progress: improving  Outcome Summary: Patient demonstrated improving motor control when ambulating. SHe was able to ambulate 280 feet today. Some c/o nausea limiting her     Time Calculation:   PT Charges     Row Name 04/16/20 0900             Time Calculation    Start Time  0900  -SC      PT Received On  04/16/20  -SC      PT Goal Re-Cert Due Date  04/23/20  -SC         Time Calculation- PT    Total Timed Code Minutes- PT  30 minute(s)  -SC         Timed Charges    22094 - PT Therapeutic Exercise Minutes  10  -SC      74657 - Gait Training Minutes   20  -SC         User Key  (r) = Recorded By, (t) = Taken By, (c) = Cosigned By    Initials Name Provider Type    SC Lance Velásquez, PT Physical Therapist        Therapy Charges for Today     Code Description Service Date Service Provider Modifiers Qty    [id number]  PT THER PROC EA 15 MIN 4/16/2020 Lance Velásquez, PT GP 1    [id number]  GAIT TRAINING EA 15 MIN 4/16/2020 Lance Velásquez PT GP 1          PT G-Codes  Outcome Measure Options: AM-PAC 6 Clicks Basic Mobility (PT)  AM-PAC 6 Clicks Score (PT): 17  AM-PAC 6 Clicks Score (OT): 17    Lance Velásquez PT  4/16/2020

## 2020-04-16 NOTE — DISCHARGE SUMMARY
[facility]  DISCHARGE SUMMARY    Patient Name: Prisca Ellis  : 1945  MRN: [id number]    Date of Admission: 2020 10:55 AM  Date of Discharge:  20  Primary Care Physician: Alva Bear MD    Consults     Date and Time Order Name Status Description    2020 1411 Inpatient Orthopedic Surgery Consult Completed           Hospital Course     Presenting Problem:   Closed fracture of right hip, initial encounter (CMS/Formerly [facility]) [S72.001A]    Active Hospital Problems    Diagnosis  POA   • **Closed fracture of right hip (CMS/Formerly [facility]) [S72.001A]  Yes   • Tachycardia [R00.0]  Yes   • GERD without esophagitis [K21.9]  Yes   • Osteoporosis with fracture [M80.80XA]  Blood loss anemia  Chronic iron defeciency  Yes      Resolved Hospital Problems   No resolved problems to display.          Hospital Course:  Prisca Ellis is a 75 y.o. female with past medical history significant for GERD, osteoporosis, chronic iron deficiency and patient has been on iron replacement.  Patient was admitted on 2024 right hip fracture.  Initial evaluation included a CBC which was a normal study.  Basic metabolic panel also was done which showed glucose of 114 the rest of the parameters within normal limits.  Creatinine kinase was elevated at 398.  X-ray of right hip showed fracture of the right hip.  A chest x-ray also was done which was a normal study and the chest was clear.  Patient was admitted to telemetry and orthopedic surgery was consulted.  Patient had a surgery on the right hip.  Postsurgically patient had significant drop in hemoglobin and hematocrit.  We monitored that very closely however she did not require any blood transfusion.  Hemoglobin and hematocrit is increasing slowly but consistently.  Reticulocyte also was checked and is appropriately elevated.  Postsurgically physical therapy and Occupational Therapy have been working with the patient and patient is  ambulating in the hallways.  Patient also received 2 doses of IV iron.  Patient is hemodynamically stable.  Her blood pressure is a little on the low side but it seems to be normal for her.  We will discharge patient to the rehab.  Patient needs to follow-up with the primary care physician within 1 week and also follow-up with orthopedic surgery as per schedule.    Discharge Follow Up Recommendations for outpatient labs/diagnostics:   check hemoglobin and hematocret daily for 2 days.    Day of Discharge     HPI:   Resting in bed in no acute distress and overall comfortable.  Denies any fever or chills.  No chest pain, palpitation, shortness of breath.  No cough or coryza.  No nausea, vomiting, diarrhea, abdominal pain.  No headache    Review of Systems      Vital Signs:   Temp:  [97.4 °F (36.3 °C)-98.6 °F (37 °C)] 98.1 °F (36.7 °C)  Heart Rate:  [81-93] 84  Resp:  [14-16] 16  BP: (106-127)/(43-72) 111/49     Physical Exam:  Constitutional: No acute distress, looks comfortable.  HENT: NCAT, mucous membranes moist  Respiratory: Clear to auscultation bilaterally, respiratory effort normal   Cardiovascular: RRR, no murmurs, rubs, or gallops  Gastrointestinal: Positive bowel sounds, soft, nontender, nondistended  Musculoskeletal: No bilateral ankle edema  Psychiatric: Appropriate affect, cooperative  Neurologic: Oriented x 3, strength symmetric in all extremities, Cranial Nerves grossly intact to confrontation, speech clear  Skin: No rashes    Pertinent  and/or Most Recent Results     Results from last 7 days   Lab Units 04/16/20  1128 04/16/20  0402 04/15/20  2335 04/15/20  1823 04/15/20  1133 04/15/20  0530 04/15/20  0026  04/13/20  0522 04/12/20  1103   WBC 10*3/mm3  --   --   --   --   --   --   --   --  6.68 4.99   HEMOGLOBIN g/dL 8.1* 7.8* 7.5* 7.8* 7.9* 7.1* 7.0*   < > 7.7* 12.0   HEMATOCRIT % 25.6* 24.4* 23.3* 24.1* 23.9* 22.0* 21.9*   < > 24.4* 36.9   PLATELETS 10*3/mm3  --   --   --   --   --   --   --   --   234 323   SODIUM mmol/L  --   --   --   --   --   --   --   --  139 141   POTASSIUM mmol/L  --   --   --   --   --   --   --   --  4.3 3.9   CHLORIDE mmol/L  --   --   --   --   --   --   --   --  104 102   CO2 mmol/L  --   --   --   --   --   --   --   --  26.0 27.0   BUN mg/dL  --   --   --   --   --   --   --   --  11 10   CREATININE mg/dL  --   --   --   --   --   --   --   --  0.88 0.93   GLUCOSE mg/dL  --   --   --   --   --   --   --   --  123* 114*   CALCIUM mg/dL  --   --   --   --   --   --   --   --  8.4* 10.0    < > = values in this interval not displayed.     Results from last 7 days   Lab Units 04/12/20  1103   PROTIME Seconds 12.8   INR  0.99           Invalid input(s): TG, LDLCALC, LDLREALC        Brief Urine Lab Results     None          Microbiology Results Abnormal     None          Imaging Results (All)     Procedure Component Value Units Date/Time    FL C Arm During Surgery [[id number]] Collected:  04/12/20 1858     Updated:  04/13/20 0919    Narrative:       EXAMINATION: FL C ARM DURING SURGERY - 04/12/2020     INDICATION: Hip trochanteric nailing.      S72.001A-Fracture of unspecified part of neck of right femur, initial  encounter for closed fracture.     COMPARISON: None.     FINDINGS: Dictation is to record 3 minutes and 58 seconds of fluoroscopy  time. Single AP view shows trochanteric nail placement apparently for  repair of previously noted intertrochanteric fracture. Of the bony  structures included, alignment appears normal. Please see the procedure  report for full details.       Impression:       Fluoroscopy provided during ORIF of the right hip.     DICTATED:   04/12/2020  EDITED/ls :   04/12/2020         This report was finalized on 4/13/2020 9:16 AM by Dr. Frankie Pryor MD.       XR Chest 1 View [[id number]] Collected:  04/12/20 1236     Updated:  04/12/20 1836    Narrative:       EXAMINATION: XR CHEST, SINGLE VIEW - 04/12/2020     INDICATION: Broken hip. Pre-op examination.         S72.001A-Fracture of unspecified part of neck of right femur, initial  encounter for closed fracture.     COMPARISON: 02/20/2017     FINDINGS: Heart and pulmonary vasculature appear normal in size but  lungs are well-expanded and appear clear. No edema, effusion or  pneumothorax is seen.       Impression:       No evidence of active chest disease.     DICTATED:   04/12/2020  EDITED/ls :   04/12/2020          This report was finalized on 4/12/2020 6:33 PM by Dr. Frankie Pryor MD.       XR Hip With or Without Pelvis 2 - 3 View Right [[id number]] Collected:  04/12/20 1239     Updated:  04/12/20 1831    Narrative:       EXAMINATION: XR RIGHT HIP W OR WO PELVIS, 2-3 VIEWS - 04/12/2020     INDICATION: Right hip pain status post fall one day prior.     S72.001A-Fracture of unspecified part of neck of right femur, initial  encounter for closed fracture.     COMPARISON: None.     FINDINGS: There is an intertrochanteric fracture of the right hip with  mild, if any, comminution other than avulsion of the lesser trochanter.  Remaining bony structures appear osteopenic but intact.       Impression:       Acute intertrochanteric fracture of the right hip.      DICTATED:   04/12/2020  EDITED/ls :   04/12/2020      This report was finalized on 4/12/2020 6:28 PM by Dr. Frankie Pryor MD.                                Discharge Details        Discharge Medications      New Medications      Instructions Start Date   acetaminophen 325 MG tablet  Commonly known as:  TYLENOL   650 mg, Oral, Every 6 Hours PRN      HYDROcodone-acetaminophen 5-325 MG per tablet  Commonly known as:  NORCO   1 tablet, Oral, Every 6 Hours PRN         Continue These Medications      Instructions Start Date   Actonel 35 MG tablet  Generic drug:  risedronate  Notes to patient:  Next dose April 17, 2020   35 mg, Oral, Every 7 Days, with water on empty stomach, nothing by mouth or lie down for next 30 minutes. On Fridays.      MULTIVITAMIN PO   1 tablet, Oral, Daily       pantoprazole 40 MG EC tablet  Commonly known as:  PROTONIX   40 mg, Oral, Daily      vitamin B-12 1000 MCG tablet  Commonly known as:  CYANOCOBALAMIN   1,000 mcg, Oral, Daily             Allergies   Allergen Reactions   • Sulfa Antibiotics          Discharge Disposition:  Rehab Facility or Unit (DC - External)    Diet:  Hospital:  Diet Order   Procedures   • Diet Regular       Activity:  Activity Instructions     Activity as Tolerated      Additional Activity Instructions:      You may bear weight as tolerated on your right leg.     Use ice as needed for pain and swelling.                          CODE STATUS:    Code Status and Medical Interventions:   Ordered at: 04/12/20 1315     Level Of Support Discussed With:    Patient     Code Status:    CPR     Medical Interventions (Level of Support Prior to Arrest):    Full       No future appointments.    Additional Instructions for the Follow-ups that You Need to Schedule     Discharge Follow-up with PCP   As directed       Currently Documented PCP:    Alva Bear MD    PCP Phone Number:    [phone]     Follow Up Details:  within one week         Discharge Follow-up with Specified Provider: orthopedic surgery as per schedule.   As directed      To:  orthopedic surgery as per schedule.               Time Spent on Discharge: 38 minutes    Electronically signed by Cheko Burrell MD, 04/16/20, 1:12 PM.

## 2020-04-16 NOTE — PLAN OF CARE
Problem: Patient Care Overview  Goal: Plan of Care Review  Flowsheets (Taken 4/16/2020 0516)  Progress: no change  Plan of Care Reviewed With: patient  Outcome Summary: VSS, voids well, pt rested thouhgout the night, pain managed with PRN medications, will continue to montior for changes.

## 2020-04-16 NOTE — PLAN OF CARE
Problem: Patient Care Overview  Goal: Plan of Care Review  Flowsheets  Taken 4/16/2020 1106  Progress: improving  Plan of Care Reviewed With: patient  Taken 4/16/2020 1103  Outcome Summary: Patient demonstrated improving motor control when ambulating. SHe was able to ambulate 280 feet today. Some c/o nausea limiting her

## 2020-04-16 NOTE — PROGRESS NOTES
"Orthopedic Daily Progress Note      CC: NICOLA overnight. Patient doing well with PT. Possibly will receive PRBCs for anemia today, which has delayed her tx to [facility].    Pain well controlled  General: no fevers, chills  Abdomen: no nausea, vomiting, or diarrhea    No other complaints    Physical Exam:  I have reviewed the vital signs.  Temp:  [97.3 °F (36.3 °C)-98.6 °F (37 °C)] 98.3 °F (36.8 °C)  Heart Rate:  [81-93] 93  Resp:  [14-16] 16  BP: (105-127)/(43-72) 106/43    Objective:  Vital signs: (most recent): Blood pressure 106/43, pulse 93, temperature 98.3 °F (36.8 °C), temperature source Oral, resp. rate 16, height 152.4 cm (60\"), weight 51.7 kg (114 lb), SpO2 94 %.            General Appearance:    Alert, cooperative, no distress  Extremities: No clubbing, cyanosis, or edema to lower extremities  Pulses:  2+ in distal surgical extremity  Skin: Dressing Clean/dry/intact      Results Review:    I have reviewed the labs, radiology results and diagnostic studies:Hgb 7.8 this AM    Results from last 7 days   Lab Units 04/16/20  0402  04/13/20  0522   WBC 10*3/mm3  --   --  6.68   HEMOGLOBIN g/dL 7.8*   < > 7.7*   PLATELETS 10*3/mm3  --   --  234    < > = values in this interval not displayed.     Results from last 7 days   Lab Units 04/13/20  0522   SODIUM mmol/L 139   POTASSIUM mmol/L 4.3   CO2 mmol/L 26.0   CREATININE mg/dL 0.88   GLUCOSE mg/dL 123*       I have reviewed the medications.    Assessment/Problem List  POD# 4 Day Post-Op   S/p R TFN  Post op blood loss anemia    Plan  WBAT RLE  PT/OT  DVT ppx with Lovenox 40 mg sq daily x 2 weeks followed by  mg daily x 4 weeks        Discharge Planning: I expect patient to be discharged to in rehab/[facility] in 0-2 days.    Kash Akins Jr., MD  04/16/20  10:24            "

## 2020-04-16 NOTE — PROGRESS NOTES
Case Management Discharge Note      Final Note: Ms. Ellis has a skilled bed at [facility] today, if medically ready.  Notified patient at the bedside.  Notified Dr. Burrell.  [facility] is scheduled for today at 2:30pm.  Please have Ms. Ellis at the entrance between the [address] by  time.  Please call report to [facility] at ph [phone] and have a copy of the DC summary, AVS and any hard scripts in the DC packet.   Thank you.      Provided Post Acute Provider List?: Yes  Post Acute Provider List: Nursing Home  Provided Post Acute Provider Quality & Resource List?: Yes  Post Acute Provider Quality and Resource List: Nursing Home  Delivered To: Patient  Method of Delivery: In person    Destination - Selection Complete      Service Provider Request Status Selected Services Address Phone Number Fax Number    [facility] Selected Skilled Nursing [address] [id number] [phone]      Durable Medical Equipment      No service has been selected for the patient.      Dialysis/Infusion      No service has been selected for the patient.      Home Medical Care      No service has been selected for the patient.      Therapy      No service has been selected for the patient.      Community Resources      No service has been selected for the patient.             Final Discharge Disposition Code: 03 - skilled nursing facility (SNF)

## 2020-04-16 NOTE — PLAN OF CARE
Problem: Patient Care Overview  Goal: Plan of Care Review  Flowsheets (Taken 4/16/2020 0801)  Outcome Summary: Pt alert and motivated to work with OT. Mod A bed mobility. CGA in-room mobility. Min A UIC transfer. Pt presents with improved endurance, standing sink side for sponge bathing and grooming tasks. Mod A LB dressing with education reinforced for use of AE. OT will continue to follow IP. Plan is [facility] at d/c when medically ready.

## 2020-04-16 NOTE — THERAPY TREATMENT NOTE
Acute Care - Occupational Therapy Treatment Note  [facility]     Patient Name: Prisca Ellis  : 1945  MRN: [id number]  Today's Date: 2020  Onset of Illness/Injury or Date of Surgery: 20  Date of Referral to OT: 20  Referring Physician: [place]    Admit Date: 2020       ICD-10-CM ICD-9-CM   1. Closed fracture of right hip, initial encounter (CMS/[facility]) S72.001A 820.8   2. Impaired mobility and ADLs Z74.09 799.89     Patient Active Problem List   Diagnosis   • Small bowel obstruction (CMS/[facility])   • Closed fracture of right hip (CMS/[facility])   • Tachycardia   • GERD without esophagitis   • Osteoporosis with fracture     Past Medical History:   Diagnosis Date   • Osteoporosis    • Ulcer      Past Surgical History:   Procedure Laterality Date   • COLONOSCOPY     • EXPLORATORY LAPAROTOMY N/A 2017    Procedure: LAPAROTOMY EXPLORATORY WITH LYSIS OF ADHESIONS; REDUCTION OF HERNIA  ;  Surgeon: Sami Griffiths MD;  Location: [facility];  Service:    • HIP TROCHANTERIC NAILING WITH INTRAMEDULLARY HIP SCREW Right 2020    Procedure: HIP TROCHANTERIC NAILING WITH INTRAMEDULLARY HIP SCREW;  Surgeon: Kash Akins Jr., MD;  Location: [facility];  Service: Orthopedics;  Laterality: Right;   • TUBAL ABDOMINAL LIGATION         Therapy Treatment    Rehabilitation Treatment Summary     Row Name 20 0801             Treatment Time/Intention    Discipline  occupational therapist  -TB      Document Type  therapy note (daily note)  -TB      Subjective Information  complains of;pain  -TB      Mode of Treatment  individual therapy  -TB      Patient Effort  good  -TB      Comment  anxious  -TB      Existing Precautions/Restrictions  fall;right;hip WBAT  -TB      Recorded by [TB] Rosario Lr OT 20 0857      Row Name 20 0801             Vital Signs    Pre Systolic BP Rehab  106 RN cleared OT  -TB      Pre Treatment Diastolic BP  43  -TB      O2 Delivery Post Treatment  room air   -TB      Pre Patient Position  Supine  -TB      Intra Patient Position  Standing  -TB      Post Patient Position  Sitting  -TB      Recorded by [TB] Rosario Lr, OT 04/16/20 0857      Row Name 04/16/20 0801             Cognitive Assessment/Intervention- PT/OT    Affect/Mental Status (Cognitive)  anxious  -TB      Behavioral Issues (Cognitive)  overwhelmed easily  -TB      Orientation Status (Cognition)  oriented x 4  -TB      Follows Commands (Cognition)  over 90% accuracy;verbal cues/prompting required  -TB      Safety Deficit (Cognitive)  mild deficit  -TB      Recorded by [TB] Rosario Lr, OT 04/16/20 0857      Row Name 04/16/20 0801             Safety Issues, Functional Mobility    Safety Issues Affecting Function (Mobility)  safety precautions follow-through/compliance;sequencing abilities  -TB      Impairments Affecting Function (Mobility)  balance;pain;range of motion (ROM);strength  -TB      Recorded by [TB] Rosario Lr, OT 04/16/20 0857      Row Name 04/16/20 0801             Mobility Assessment/Intervention    Extremity Weight-bearing Status  right lower extremity  -TB      Right Lower Extremity (Weight-bearing Status)  weight-bearing as tolerated (WBAT)  -TB      Recorded by [TB] Rosario Lr, OT 04/16/20 0857      Row Name 04/16/20 0801             Bed Mobility Assessment/Treatment    Bed Mobility Assessment/Treatment  supine-sit  -TB      Scooting/Bridging [place] (Bed Mobility)  minimum assist (75% patient effort);verbal cues  -TB      Supine-Sit [place] (Bed Mobility)  moderate assist (50% patient effort);verbal cues  -TB      Bed Mobility, Safety Issues  decreased use of legs for bridging/pushing  -TB      Assistive Device (Bed Mobility)  head of bed elevated;bed rails;leg   -TB      Comment (Bed Mobility)  increased time and max cues to sequence  -TB      Recorded by [TB] Rosario Lr, OT 04/16/20 0857      Row Name 04/16/20 0801              Functional Mobility    Functional Mobility- Ind. Level  contact guard assist;verbal cues required  -TB      Functional Mobility- Device  rolling walker  -TB      Functional Mobility-Distance (Feet)  30  -TB      Functional Mobility- Safety Issues  step length decreased;weight-shifting ability decreased;balance decreased during turns  -TB      Functional Mobility- Comment  slow pace; in-room/to BR ambulation  -TB      Recorded by [TB] Rosario Lr, OT 04/16/20 0857      Row Name 04/16/20 0801             Transfer Assessment/Treatment    Transfer Assessment/Treatment  sit-stand transfer;stand-sit transfer;bed-chair transfer  -TB      Comment (Transfers)  encouragement, cues for hand placement, sequencing and safety  -TB      Recorded by [TB] Rosario Lr OT 04/16/20 0857      Row Name 04/16/20 0801             Bed-Chair Transfer    Bed-Chair [place] (Transfers)  minimum assist (75% patient effort);verbal cues  -TB      Assistive Device (Bed-Chair Transfers)  walker, front-wheeled  -TB      Recorded by [TB] Rosario Lr OT 04/16/20 0857      Row Name 04/16/20 0801             Sit-Stand Transfer    Sit-Stand [place] (Transfers)  minimum assist (75% patient effort);verbal cues  -TB      Assistive Device (Sit-Stand Transfers)  walker, front-wheeled  -TB      Recorded by [TB] Rosario Lr, OT 04/16/20 0857      Row Name 04/16/20 0801             Stand-Sit Transfer    Stand-Sit [place] (Transfers)  contact guard;verbal cues  -TB      Assistive Device (Stand-Sit Transfers)  walker, front-wheeled  -TB      Recorded by [TB] Rosario Lr OT 04/16/20 0857      Row Name 04/16/20 0801             ADL Assessment/Intervention    09627 - OT Self Care/Mgmt Minutes  40  -TB      BADL Assessment/Intervention  bathing;upper body dressing;lower body dressing;grooming;feeding  -TB      Recorded by [TB] Rosario Lr OT 04/16/20 0857      Row Name  04/16/20 0801             Bathing Assessment/Intervention    Bathing [place] Level  upper body;lower body;contact guard assist;verbal cues  -TB      Bathing Position  sink side  -TB      Comment (Bathing)  increased time; great effort  -TB      Recorded by [TB] Rosario Lr, OT 04/16/20 0857      Row Name 04/16/20 0801             Upper Body Dressing Assessment/Training    Upper Body Dressing [place] Level  doff;don;pajama/robe;minimum assist (75% patient effort)  -TB      Comment (Upper Body Dressing)  assist for lines  -TB      Recorded by [TB] Rosario Lr, OT 04/16/20 0857      Row Name 04/16/20 0801             Lower Body Dressing Assessment/Training    Lower Body Dressing [place] Level  doff;don;socks;moderate assist (50% patient effort);verbal cues  -TB      Assistive Devices (Lower Body Dressing)  reacher;sock-aid  -TB      Comment (Lower Body Dressing)  Education reinforced for use of AE to increase ADL independence  -TB      Recorded by [TB] Rosario Lr, OT 04/16/20 0857      Row Name 04/16/20 0801             Grooming Assessment/Training    [place] Level (Grooming)  set up;supervision  -TB      Grooming Position  sink side  -TB      Recorded by [TB] Rosario Lr, OT 04/16/20 0857      Row Name 04/16/20 0801             Self-Feeding Assessment/Training    [place] Level (Feeding)  independent;scoop food and bring to mouth;liquids to mouth  -TB      Position (Self-Feeding)  supported sitting  -TB      Recorded by [TB] Rosario Lr, OT 04/16/20 0857      Row Name 04/16/20 0801             BADL Safety/Performance    Impairments, BADL Safety/Performance  balance;pain;range of motion;strength  -TB      Skilled BADL Treatment/Intervention  BADL process/adaptation training;adaptive equipment training  -TB      Recorded by [TB] Rosario Lr, OT 04/16/20 0857      Row Name 04/16/20 0801             Motor Skills  Assessment/Interventions    Additional Documentation  Balance (Group)  -TB      Recorded by [TB] Rosario Lr, OT 04/16/20 0857      Row Name 04/16/20 0801             Balance    Balance  dynamic sitting balance;dynamic standing balance  -TB      Recorded by [TB] Rosario Lr, OT 04/16/20 0857      Row Name 04/16/20 0801             Dynamic Sitting Balance    Level of [place], Reaches Outside Midline (Sitting, Dynamic Balance)  supervision  -TB      Sitting Position, Reaches Outside Midline (Sitting, Dynamic Balance)  sitting on edge of bed;sitting in chair  -TB      Comment, Reaches Outside Midline (Sitting, Dynamic Balance)  ADL tasks  -TB      Recorded by [TB] Rosario Lr, OT 04/16/20 0857      Row Name 04/16/20 0801             Dynamic Standing Balance    Level of [place], Reaches Outside Midline (Standing, Dynamic Balance)  contact guard assist;standby assist progressed to SBA  -TB      Time Able to Maintain Position, Reaches Outside Midline (Standing, Dynamic Balance)  more than 5 minutes  -TB      Assistive Device Utilized (Supported Standing, Dynamic Balance)  walker, rolling  -TB      Comment, Reaches Outside Midline (Standing, Dynamic Balance)  Bathing/grooming tasks at sink side  -TB      Recorded by [TB] Rosario Lr, OT 04/16/20 0857      Row Name 04/16/20 0801             Positioning and Restraints    Pre-Treatment Position  in bed  -TB      Post Treatment Position  chair  -TB      In Chair  notified nsg;reclined;call light within reach;encouraged to call for assist;exit alarm on;waffle cushion;legs elevated  -TB      Recorded by [TB] Rosario Lr, OT 04/16/20 0857      Row Name 04/16/20 0801             Pain Assessment    Additional Documentation  Pain Scale: Word Pre/Post-Treatment (Group)  -TB      Recorded by [TB] Rosario Lr, OT 04/16/20 0857      Row Name 04/16/20 0801             Pain Scale: Numbers Pre/Post-Treatment     Pain Location - Side  Right  -TB      Pain Location - Orientation  lower  -TB      Pain Location  extremity  -TB      Pain Intervention(s)  Ambulation/increased activity;Repositioned;Medication (See MAR) pt pre-medicated for session  -TB      Recorded by [TB] Rosario Lr, OT 04/16/20 0857      Row Name 04/16/20 0801             Pain Scale: Word Pre/Post-Treatment    Pain: Word Scale, Pretreatment  2 - mild pain  -TB      Pain: Word Scale, Post-Treatment  4 - moderate pain  -TB      Pre/Post Treatment Pain Comment  increased pain with bed mobility  -TB      Recorded by [TB] Rosario Lr, OT 04/16/20 0857      Row Name                Wound Right lateral greater trochanter    Wound - Properties Group Side: Right [RB] Orientation: lateral [RB2] Location: greater trochanter [RB] Recorded by:  [RB] Roxy Crook RN 04/12/20 1650 [RB2] Roxy Crook RN 04/12/20 1651    Row Name 04/16/20 0801             Coping    Observed Emotional State  calm;cooperative  -TB      Verbalized Emotional State  acceptance  -TB      Recorded by [TB] Rosario Lr, OT 04/16/20 0857      Row Name 04/16/20 0801             Plan of Care Review    Plan of Care Reviewed With  patient  -TB      Progress  improving  -TB      Recorded by [TB] Rosario Lr, OT 04/16/20 0857      Row Name 04/16/20 0801             Outcome Summary/Treatment Plan (OT)    Daily Summary of Progress (OT)  progress toward functional goals as expected  -TB      Barriers to Overall Progress (OT)  pain, strength, ROM; hypotensive  -TB      Plan for Continued Treatment (OT)  per POC  -TB      Anticipated Discharge Disposition (OT)  inpatient rehabilitation facility  -TB      Recorded by [TB] Rosario Lr, OT 04/16/20 0857        User Key  (r) = Recorded By, (t) = Taken By, (c) = Cosigned By    Initials Name Effective Dates Discipline    Rosario Campos, OT 06/08/18 -  OT    Roxy Borrero RN 07/02/19 -  Nurse         Wound Right lateral greater trochanter (Active)   Dressing Appearance dried drainage;dry;intact 4/15/2020  8:00 PM           OT Recommendation and Plan  Outcome Summary/Treatment Plan (OT)  Daily Summary of Progress (OT): progress toward functional goals as expected  Barriers to Overall Progress (OT): pain, strength, ROM; hypotensive  Plan for Continued Treatment (OT): per POC  Anticipated Equipment Needs at Discharge (OT): bedside commode, shower chair(Education initiated for recommended DME)  Anticipated Discharge Disposition (OT): inpatient rehabilitation facility  Planned Therapy Interventions (OT Eval): transfer/mobility retraining, occupation/activity based interventions, BADL retraining, adaptive equipment training  Therapy Frequency (OT Eval): daily  Daily Summary of Progress (OT): progress toward functional goals as expected  Plan of Care Review  Plan of Care Reviewed With: patient  Plan of Care Reviewed With: patient  Outcome Summary: Pt alert and motivated to work with OT. Mod A bed mobility. CGA in-room mobility. Min A UIC transfer. Pt presents with improved endurance, standing sink side for sponge bathing and grooming tasks. Mod A LB dressing with education reinforced for use of AE. OT will continue to follow IP. Plan is IRF at d/c when medically ready.  Outcome Measures     Row Name 04/16/20 0801 04/15/20 1335 04/14/20 1500       How much help from another is currently needed...    Putting on and taking off regular lower body clothing?  2  -TB  2  -TB  2  -AR    Bathing (including washing, rinsing, and drying)  3  -TB  2  -TB  2  -AR    Toileting (which includes using toilet bed pan or urinal)  2  -TB  2  -TB  2  -AR    Putting on and taking off regular upper body clothing  3  -TB  3  -TB  3  -AR    Taking care of personal grooming (such as brushing teeth)  3  -TB  3  -TB  3  -AR    Eating meals  4  -TB  4  -TB  3  -AR    AM-PAC 6 Clicks Score (OT)  17  -TB  16  -TB  15  -AR       Functional  Assessment    Outcome Measure Options  AM-PAC 6 Clicks Daily Activity (OT)  -TB  AM-PAC 6 Clicks Daily Activity (OT)  -TB  AM-PAC 6 Clicks Daily Activity (OT)  -AR    Row Name 04/13/20 1300             How much help from another is currently needed...    Putting on and taking off regular lower body clothing?  2  -TB      Bathing (including washing, rinsing, and drying)  2  -TB      Toileting (which includes using toilet bed pan or urinal)  2  -TB      Putting on and taking off regular upper body clothing  3  -TB      Taking care of personal grooming (such as brushing teeth)  3  -TB      Eating meals  4  -TB      AM-PAC 6 Clicks Score (OT)  16  -TB         Functional Assessment    Outcome Measure Options  AM-PAC 6 Clicks Daily Activity (OT)  -TB        User Key  (r) = Recorded By, (t) = Taken By, (c) = Cosigned By    Initials Name Provider Type    TB Rosario Lr OT Occupational Therapist    Nissa Agarwal, OT Occupational Therapist           Time Calculation:   Time Calculation- OT     Row Name 04/16/20 0901 04/16/20 0801          Time Calculation- OT    OT Start Time  0801  -TB  --     OT Received On  04/16/20  -TB  --     OT Goal Re-Cert Due Date  04/23/20  -TB  --        Timed Charges    16236 - OT Self Care/Mgmt Minutes  --  40  -TB       User Key  (r) = Recorded By, (t) = Taken By, (c) = Cosigned By    Initials Name Provider Type    TB Rosario Lr OT Occupational Therapist        Therapy Charges for Today     Code Description Service Date Service Provider Modifiers Qty    68355764458 HC OT SELF CARE/MGMT/TRAIN EA 15 MIN 4/15/2020 Rosario Lr, OT GO 2    64485599683 HC OT SELF CARE/MGMT/TRAIN EA 15 MIN 4/16/2020 Rosario Lr, OT GO 3               Rosario rL OT  4/16/2020

## 2020-05-04 ENCOUNTER — READMISSION MANAGEMENT (OUTPATIENT)
Dept: CALL CENTER | Facility: HOSPITAL | Age: 75
End: 2020-05-04

## 2020-05-04 NOTE — OUTREACH NOTE
Prep Survey      Responses   [facility] patient discharged from?  [place]   Is LACE score < 7 ?  No   Eligibility  Readm Mgmt   Discharge diagnosis  s/p hip trochanteric nailing with intramedullary hip screw   Does the patient have one of the following disease processes/diagnoses(primary or secondary)?  General Surgery   Does the patient have Home health ordered?  Yes   What is the Home health agency?   [facility]    Is there a DME ordered?  No   General alerts for this patient  DC from rehab on 4/29   Prep survey completed?  Yes          Trang Arreola RN

## 2020-05-08 ENCOUNTER — READMISSION MANAGEMENT (OUTPATIENT)
Dept: CALL CENTER | Facility: HOSPITAL | Age: 75
End: 2020-05-08

## 2020-05-08 NOTE — OUTREACH NOTE
General Surgery Week 4 Survey      Responses   [facility] patient discharged from?  [place]   Does the patient have one of the following disease processes/diagnoses(primary or secondary)?  General Surgery   Week 4 attempt successful?  Yes   Call start time  0941   Call end time  0943   General alerts for this patient  DC from rehab on 4/29   Discharge diagnosis  s/p hip trochanteric nailing with intramedullary hip screw   Is the patient taking all medications as directed (includes completed medication regime)?  Yes   Has the patient kept scheduled appointments due by today?  Yes   Comments  Has appt on Monday   Is the patient still receiving Home Health Services?  Yes   Psychosocial issues?  No   What is the patient's perception of their health status since discharge?  Improving   Nursing interventions  Nurse provided patient education   Is the patient/caregiver able to teach back steps to recovery at home?  Rest and rebuild strength, gradually increase activity   Is the patient/caregiver able to teach back the hierarchy of who to call/visit for symptoms/problems? PCP, Specialist, Home health nurse, Urgent Care, ED, 911  Yes   Additional teach back comments  Pt doing well after d/c from rehab and hip is healing very well.   Week 4 call completed?  Yes   Would the patient like one additional call?  No   Graduated  Yes   Did the patient feel the follow up calls were helpful during their recovery period?  Yes   Was the number of calls appropriate?  Yes          Mehnaz Agustin RN

## 2020-09-08 ENCOUNTER — TREATMENT (OUTPATIENT)
Dept: PHYSICAL THERAPY | Facility: CLINIC | Age: 75
End: 2020-09-08

## 2020-09-08 DIAGNOSIS — Z96.641 HISTORY OF TOTAL RIGHT HIP REPLACEMENT: Primary | ICD-10-CM

## 2020-09-08 PROCEDURE — 97161 PT EVAL LOW COMPLEX 20 MIN: CPT | Performed by: PHYSICAL THERAPIST

## 2020-09-08 PROCEDURE — 97530 THERAPEUTIC ACTIVITIES: CPT | Performed by: PHYSICAL THERAPIST

## 2020-09-08 NOTE — PROGRESS NOTES
Physical Therapy Initial Evaluation and Plan of Care      Patient: Prisca Ellis   : 1945  Diagnosis/ICD-10 Code:  No primary diagnosis found.  Referring practitioner: Kash Akins Jr., MD    Subjective Evaluation    History of Present Illness  Date of onset: 2020  Mechanism of injury: Pt reports that she fell in April breaking her R hip and having ORIF to repair it. Pt states that she went to [facility] for 3 weeks following surgery and has been doing home health until a couple of weeks ago. Pt reports that going up and down stairs and getting into and out of the car. Pt reports that she is nervous due to her back CT scan. Pt states that most of her pain is in the anterior hip. Pt reports that she has been having R knee pain.       Patient Occupation: retired Pain  Current pain ratin  At best pain ratin  Aggravating factors: stairs, movement, ambulation and squatting  Progression: improved    Social Support  Lives with: spouse    Diagnostic Tests  No diagnostic tests performed    Treatments  Previous treatment: home therapy and physical therapy  Patient Goals  Patient goals for therapy: increased motion, increased strength and improved balance             Objective          Palpation   Left   No palpable tenderness to the lumbar paraspinals.     Right   No palpable tenderness to the lumbar paraspinals.     Tenderness     Lumbar Spine  No tenderness in the spinous process.     Left Hip   No tenderness in the PSIS.     Right Hip   Tenderness in the PSIS.     Neurological Testing     Reflexes   Left   Patellar (L4): normal (2+)  Achilles (S1): trace (1+)    Right   Patellar (L4): normal (2+)  Achilles (S1): trace (1+)    Active Range of Motion     Lumbar   Flexion: WFL  Left lateral flexion: WFL  Right lateral flexion: WFL  Left rotation: WFL  Right rotation: WFL    Additional Active Range of Motion Details  Ext avoided due to anterolisthesis    Strength/Myotome Testing     Left Hip    Planes of Motion   Flexion: 4    Right Hip   Planes of Motion   Flexion: 4  Abduction: 3+    Left Knee   Flexion: 4  Extension: 4+    Right Knee   Flexion: 4-  Extension: 4+    Left Ankle/Foot   Dorsiflexion: 4+    Right Ankle/Foot   Dorsiflexion: 4          Assessment & Plan     Assessment  Impairments: activity intolerance, impaired balance, impaired physical strength, lacks appropriate home exercise program and pain with function  Assessment details: Pt is a 75 year old female that presents following R RENU. Pt with no neuro signs or symptoms. Pt with anterolisthesis in the lumbar spine she was concerned about and pt was educated on condition. Pt with weakness in the R hip compared to L. Pt provided with strengthening exercises and activities to help improve lumbar motion. Pt is very fearful of falling which is limiting her activity and function. Pt would benefit from PT to help improve the above deficits.   Prognosis: good  Prognosis details: Short Term Goals (4 weeks)  1. Pt will demonstrate independence with HEP  2. Pt will increase R hip abduction to 4-/5 to help with stability with ambulation  3. Pt will present with TUG test time > 12 seconds to display decreased fall risk    Long Term Goals (8 weeks)  1. Pt will present with R hip abduction of 4/5 to improve stability with SL activities  2. Pt will be able to ascend and descend 10 steps without handrail without assistance   3. Pt will be able to perform 45 mins of moderate activity in the clinic   Functional Limitations: walking  Plan  Therapy options: will be seen for skilled physical therapy services  Planned modality interventions: cryotherapy and thermotherapy (hydrocollator packs)  Planned therapy interventions: abdominal trunk stabilization, balance/weight-bearing training, fine motor coordination training, flexibility, functional ROM exercises, home exercise program, joint mobilization, manual therapy, motor coordination training, neuromuscular  re-education, soft tissue mobilization, spinal/joint mobilization, strengthening, stretching and therapeutic activities  Frequency: 2x week  Treatment plan discussed with: patient  Plan details: Pt to be seen 2x per week for 4-6 weeks        Manual Therapy:         mins  64745;  Therapeutic Exercise:         mins  59995;     Neuromuscular Phil:        mins  41286;    Therapeutic Activity:    12      mins  87771;     Gait Training:           mins  66141;     Ultrasound:          mins  24997;    Electrical Stimulation:         mins  59092 ( );  Dry Needling          mins self-pay    Timed Treatment:   12   mins   Total Treatment:     41   mins    PT SIGNATURE: Boubacar Redmond, PT   DATE TREATMENT INITIATED: 9/8/2020    Initial Certification  Certification Period: 12/7/2020  I certify that the therapy services are furnished while this patient is under my care.  The services outlined above are required by this patient, and will be reviewed every 90 days.     PHYSICIAN: Kash Akins Jr., MD      DATE:     Please sign and return via fax to [phone].. Thank you, [facility].

## 2020-09-15 ENCOUNTER — TREATMENT (OUTPATIENT)
Dept: PHYSICAL THERAPY | Facility: CLINIC | Age: 75
End: 2020-09-15

## 2020-09-15 DIAGNOSIS — Z96.641 HISTORY OF TOTAL RIGHT HIP REPLACEMENT: Primary | ICD-10-CM

## 2020-09-15 PROCEDURE — 97110 THERAPEUTIC EXERCISES: CPT | Performed by: PHYSICAL THERAPIST

## 2020-09-15 PROCEDURE — 97140 MANUAL THERAPY 1/> REGIONS: CPT | Performed by: PHYSICAL THERAPIST

## 2020-09-15 PROCEDURE — 97112 NEUROMUSCULAR REEDUCATION: CPT | Performed by: PHYSICAL THERAPIST

## 2020-09-15 NOTE — PROGRESS NOTES
Physical Therapy Daily Progress Note      Visit #: 2    Prisca Ellis reports 0/10 pain today at rest.  Pt reports that she is doing her HEP at the house on her bed. Pt states that she is feeling more confident and comfortable moving at home with less fear of doing stairs and falling.         Objective Pt present to PT today with no distress at rest.     Pt with pain in the lumbar spine with SL hip abduction which was held.     Pt hand no increased pain in the R hip or lumbar spine following activities today.       See Exercise, Manual, and Modality Logs for complete treatment.     Assessment/Plan  Pt continues to be pain free at rest although is unsteady on her feet. Pt is very nervous about moving and even being active around her house. Pt would benefit from PT to help improve hip strength, functional mobility, and balance to help improve safety with daily activity.       Progress per Plan of Care  Progress as tolerated    Visit Diagnosis:    ICD-10-CM ICD-9-CM   1. History of total right hip replacement  Z96.641 V43.64            Manual Therapy:    9     mins  69625;  Therapeutic Exercise:    17     mins  08776;     Neuromuscular Phil:    13    mins  11218;    Therapeutic Activity:          mins  25653;     Gait Training:      _  mins  91662;     Ultrasound:          mins  17526;    Electrical Stimulation:         mins  00009 ( );  Dry Needling          mins self-pay    Timed Treatment:   39   mins   Total Treatment:     51   mins    Boubacar Redmond, PT  Physical Therapist

## 2020-09-18 ENCOUNTER — TREATMENT (OUTPATIENT)
Dept: PHYSICAL THERAPY | Facility: CLINIC | Age: 75
End: 2020-09-18

## 2020-09-18 DIAGNOSIS — Z96.641 HISTORY OF TOTAL RIGHT HIP REPLACEMENT: Primary | ICD-10-CM

## 2020-09-18 PROCEDURE — 97110 THERAPEUTIC EXERCISES: CPT | Performed by: PHYSICAL THERAPIST

## 2020-09-18 PROCEDURE — 97112 NEUROMUSCULAR REEDUCATION: CPT | Performed by: PHYSICAL THERAPIST

## 2020-09-18 NOTE — PROGRESS NOTES
Physical Therapy Daily Progress Note      Visit #: 3    Prisca Ellis reports 0/10 pain today at rest.  Pt reports that her back and R hip are feeling good although has had some R knee pain recently. Pt reports that she still gets stiff in the back when doing a lot although is more confident and feels safer around the house.         Objective Pt present to PT today with no distress at rest.     Pt had no increased pain in the back or the R hip with activities today.     Pt with relief in the back when supine distraction.       See Exercise, Manual, and Modality Logs for complete treatment.     Assessment/Plan  Pt continues to respond well to hip strengthening and balance training to help improve hip and core stability. Pt to continue with PT to help improve functional mobility, activity tolerance, and safety at home with daily activities.       Progress per Plan of Care  Progress as tolerated    Visit Diagnosis:    ICD-10-CM ICD-9-CM   1. History of total right hip replacement  Z96.641 V43.64            Manual Therapy:         mins  17483;  Therapeutic Exercise:    27     mins  45643;     Neuromuscular Phil:    12    mins  48431;    Therapeutic Activity:          mins  19366;     Gait Training:      _  mins  83867;     Ultrasound:          mins  00434;    Electrical Stimulation:         mins  47714 ( );  Dry Needling          mins self-pay    Timed Treatment:   39   mins   Total Treatment:     50   mins    Boubacar Redmond, PT  Physical Therapist

## 2020-09-23 ENCOUNTER — TREATMENT (OUTPATIENT)
Dept: PHYSICAL THERAPY | Facility: CLINIC | Age: 75
End: 2020-09-23

## 2020-09-23 DIAGNOSIS — Z96.641 HISTORY OF TOTAL RIGHT HIP REPLACEMENT: Primary | ICD-10-CM

## 2020-09-23 PROCEDURE — 97110 THERAPEUTIC EXERCISES: CPT | Performed by: PHYSICAL THERAPIST

## 2020-09-23 PROCEDURE — 97112 NEUROMUSCULAR REEDUCATION: CPT | Performed by: PHYSICAL THERAPIST

## 2020-09-23 PROCEDURE — 97140 MANUAL THERAPY 1/> REGIONS: CPT | Performed by: PHYSICAL THERAPIST

## 2020-09-23 NOTE — PROGRESS NOTES
Physical Therapy Daily Progress Note      Visit #: 4    Prisca Ellis reports 0/10 pain today at rest.  Pt reports that she was not able to do much this weekend because she was very busy with her 's health issues. Pt reports that she has not had much pain and is feeling more confident moving around the house.         Objective Pt present to PT today with no distress at rest.     Pt tolerated activities well today without increased pain in the hips or back.       See Exercise, Manual, and Modality Logs for complete treatment.     Assessment/Plan  Pt continues to show improvement with functional activities in the clinic without pain or LOB. Pt to continue with PT to help increase hip strength and stability to improve safety and functional mobility.       Progress per Plan of Care  Progress as tolerated    Visit Diagnosis:    ICD-10-CM ICD-9-CM   1. History of total right hip replacement  Z96.641 V43.64            Manual Therapy:    10     mins  92202;  Therapeutic Exercise:    18     mins  90375;     Neuromuscular Phil:    16    mins  27961;    Therapeutic Activity:          mins  17463;     Gait Training:      _  mins  75397;     Ultrasound:          mins  22270;    Electrical Stimulation:         mins  22266 ( );  Dry Needling          mins self-pay    Timed Treatment:   44   mins   Total Treatment:     46   mins    Boubacar Redmond, PT  Physical Therapist

## 2020-09-25 ENCOUNTER — TREATMENT (OUTPATIENT)
Dept: PHYSICAL THERAPY | Facility: CLINIC | Age: 75
End: 2020-09-25

## 2020-09-25 DIAGNOSIS — Z96.641 HISTORY OF TOTAL RIGHT HIP REPLACEMENT: Primary | ICD-10-CM

## 2020-09-25 PROCEDURE — 97112 NEUROMUSCULAR REEDUCATION: CPT | Performed by: PHYSICAL THERAPIST

## 2020-09-25 PROCEDURE — 97110 THERAPEUTIC EXERCISES: CPT | Performed by: PHYSICAL THERAPIST

## 2020-09-25 NOTE — PROGRESS NOTES
Physical Therapy Daily Progress Note      Visit #: 5    Prisca Ellis reports 0/10 pain today at rest.  Pt reports that she felt really good leaving the after last visit. Pt states that she feels like she is able to do so much more than a few weeks ago.         Objective Pt present to PT today with no distress at rest.     Pt with improved control and form with steps today.     Pt tolerated activities well today without pain.       See Exercise, Manual, and Modality Logs for complete treatment.     Assessment/Plan  Pt continues to do well with functional strengthening activities for hips and mobility activities for back. Pt to continue with PT to help improve hip strength, stability, and overall functional mobility to improve daily acitvities.       Progress per Plan of Care  Progress as tolerated    Visit Diagnosis:    ICD-10-CM ICD-9-CM   1. History of total right hip replacement  Z96.641 V43.64            Manual Therapy:         mins  21366;  Therapeutic Exercise:    33     mins  86280;     Neuromuscular Phil:    9    mins  83978;    Therapeutic Activity:          mins  88523;     Gait Training:      _  mins  23885;     Ultrasound:          mins  63142;    Electrical Stimulation:         mins  51623 ( );  Dry Needling          mins self-pay    Timed Treatment:   42   mins   Total Treatment:     50   mins    Boubacar Redmond, PT  Physical Therapist

## 2020-09-30 ENCOUNTER — TREATMENT (OUTPATIENT)
Dept: PHYSICAL THERAPY | Facility: CLINIC | Age: 75
End: 2020-09-30

## 2020-09-30 DIAGNOSIS — Z96.641 HISTORY OF TOTAL RIGHT HIP REPLACEMENT: Primary | ICD-10-CM

## 2020-09-30 PROCEDURE — 97112 NEUROMUSCULAR REEDUCATION: CPT | Performed by: PHYSICAL THERAPIST

## 2020-09-30 PROCEDURE — 97110 THERAPEUTIC EXERCISES: CPT | Performed by: PHYSICAL THERAPIST

## 2020-09-30 PROCEDURE — 97140 MANUAL THERAPY 1/> REGIONS: CPT | Performed by: PHYSICAL THERAPIST

## 2020-09-30 NOTE — PROGRESS NOTES
Physical Therapy Daily Progress Note      Visit #: 6    Prisca Ellis reports 0/10 pain today at rest.  Pt reports that she has had some pain in her R knee although her hip and back have been feeling good. Pt reports that she was fatigued after last session but had no pain.         Objective Pt present to PT today with no distress at rest.     Pt tolerated functional activities well today without pain in the back or R hip.       See Exercise, Manual, and Modality Logs for complete treatment.     Assessment/Plan  Pt continues to improve activity tolerance and function of the hips and back. Pt had no LOB today and is improving her mobility with daily activities at home. Pt would benefit from PT to help increase activity tolerance, prevent fatigue with daily tasks, and improve balance and safety at home.       Progress per Plan of Care  Progress as tolerated    Visit Diagnosis:    ICD-10-CM ICD-9-CM   1. History of total right hip replacement  Z96.641 V43.64            Manual Therapy:    9     mins  62969;  Therapeutic Exercise:    20     mins  67083;     Neuromuscular Phil:    14    mins  79056;    Therapeutic Activity:          mins  45084;     Gait Training:      _  mins  18424;     Ultrasound:          mins  72542;    Electrical Stimulation:         mins  15296 ( );  Dry Needling          mins self-pay    Timed Treatment:   43   mins   Total Treatment:     57   mins    Boubacar Redmond, PT  Physical Therapist

## 2020-10-02 ENCOUNTER — TREATMENT (OUTPATIENT)
Dept: PHYSICAL THERAPY | Facility: CLINIC | Age: 75
End: 2020-10-02

## 2020-10-02 DIAGNOSIS — Z96.641 HISTORY OF TOTAL RIGHT HIP REPLACEMENT: Primary | ICD-10-CM

## 2020-10-02 PROCEDURE — 97140 MANUAL THERAPY 1/> REGIONS: CPT | Performed by: PHYSICAL THERAPIST

## 2020-10-02 NOTE — PROGRESS NOTES
Physical Therapy Daily Progress Note      Visit #: 7    Prisca Ellis reports 0/10 pain today at rest.  Pt reports that she had a really good day yesterday without pain in the R knee and was able to do everything around the house without fatigue.         Objective Pt present to PT today with no distress at rest.     Pt tolerated activities well today without as much fatigue as last session.       See Exercise, Manual, and Modality Logs for complete treatment.     Assessment/Plan  Pt is seeing a big improvement in her activity tolerance and pain free function. Pt to continue with PT through next week and then discuss POC with PT. Pt would benefit from PT to help increase R hip strength, stability, and function.       Progress per Plan of Care  Progress as tolerated    Visit Diagnosis:    ICD-10-CM ICD-9-CM   1. History of total right hip replacement  Z96.641 V43.64            Manual Therapy:    11     mins  09142;  Therapeutic Exercise:         mins  05528;     Neuromuscular Phil:        mins  56948;    Therapeutic Activity:          mins  20792;     Gait Training:      _  mins  93570;     Ultrasound:          mins  71247;    Electrical Stimulation:         mins  75459 ( );  Dry Needling          mins self-pay    Timed Treatment:   11   mins   Total Treatment:     59   mins    Boubacar Redmond, PT  Physical Therapist

## 2020-10-07 ENCOUNTER — TREATMENT (OUTPATIENT)
Dept: PHYSICAL THERAPY | Facility: CLINIC | Age: 75
End: 2020-10-07

## 2020-10-07 DIAGNOSIS — Z96.641 HISTORY OF TOTAL RIGHT HIP REPLACEMENT: Primary | ICD-10-CM

## 2020-10-07 PROCEDURE — 97110 THERAPEUTIC EXERCISES: CPT | Performed by: PHYSICAL THERAPIST

## 2020-10-07 NOTE — PROGRESS NOTES
Physical Therapy Daily Progress Note      Visit #: 8    Prisca Ellis reports 0/10 pain today at rest.  Pt reports that she has done well and has not had any problems at the house.         Objective Pt present to PT today with no distress at rest.     Pt with pain in the back around PSIS with lateral movements in cones and balance board. Pain resolved with pelvic tilts and DKTC.     Pt able to complete all activities after performing lumbar mobility activities.       See Exercise, Manual, and Modality Logs for complete treatment.     Assessment/Plan  Pt continues to respond well to PT with improved functional mobility, strength, and activity tolerance. Pt instructed to perform lumbar mobility activities if pain comes at home. Pt would benefit from PT to help progress strengthening and functional training as tolerated.       Progress per Plan of Care  Progress as tolerated    Visit Diagnosis:    ICD-10-CM ICD-9-CM   1. History of total right hip replacement  Z96.641 V43.64            Manual Therapy:         mins  11507;  Therapeutic Exercise:    10     mins  89543;     Neuromuscular Phil:        mins  14453;    Therapeutic Activity:          mins  02134;     Gait Training:      _  mins  70273;     Ultrasound:          mins  44787;    Electrical Stimulation:         mins  97357 ( );  Dry Needling          mins self-pay    Timed Treatment:   10   mins   Total Treatment:     58   mins    Boubacar Redmond, PT  Physical Therapist

## 2020-10-14 ENCOUNTER — TREATMENT (OUTPATIENT)
Dept: PHYSICAL THERAPY | Facility: CLINIC | Age: 75
End: 2020-10-14

## 2020-10-14 DIAGNOSIS — Z96.641 HISTORY OF TOTAL RIGHT HIP REPLACEMENT: Primary | ICD-10-CM

## 2020-10-14 PROCEDURE — 97530 THERAPEUTIC ACTIVITIES: CPT | Performed by: PHYSICAL THERAPIST

## 2020-10-14 NOTE — PROGRESS NOTES
Physical Therapy Daily Progress Note      Visit #: 9    Prisca Ellis reports 0/10 pain today at rest.  Pt reports that she continues to get pain in the R posterior hip and back randomly during the day and it seems to have increased in frequency lately. Pt reports that she is still fearful at home sometimes when trying to squat down or pick something up.         Objective Pt present to PT today with no distress at rest.     Pt with no pain in the back or hip with activities today.     Pt was able to squat down into resting squat but required UE assistance.       See Exercise, Manual, and Modality Logs for complete treatment.     Assessment/Plan  PT and pt discussed POC and pt wants to return to doctor as she is concerned with her continued pain in the back of the hip. Pt to return to doctor and call PT if further follow up is needed. Pt instructed to continue with PT HEP to help continue to strengthen hips for functional movement.       Other  Hold pt    Visit Diagnosis:    ICD-10-CM ICD-9-CM   1. History of total right hip replacement  Z96.641 V43.64           Manual Therapy:         mins  00247;  Therapeutic Exercise:         mins  92715;     Neuromuscular Phil:        mins  37335;    Therapeutic Activity:    14      mins  18242;     Gait Training:           mins  49442;     Ultrasound:          mins  41331;    Electrical Stimulation:         mins  13385 ( );  Dry Needling          mins self-pay  Iontophoresis          mins 70951      Timed Treatment:   14   mins   Total Treatment:     43   mins    Boubacar Redmond, PT  Physical Therapist

## 2021-11-01 ENCOUNTER — APPOINTMENT (OUTPATIENT)
Dept: GENERAL RADIOLOGY | Facility: HOSPITAL | Age: 76
End: 2021-11-01

## 2021-11-01 ENCOUNTER — HOSPITAL ENCOUNTER (EMERGENCY)
Facility: HOSPITAL | Age: 76
Discharge: HOME | End: 2021-11-01
Attending: EMERGENCY MEDICINE | Admitting: EMERGENCY MEDICINE

## 2021-11-01 ENCOUNTER — APPOINTMENT (OUTPATIENT)
Dept: CT IMAGING | Facility: HOSPITAL | Age: 76
End: 2021-11-01

## 2021-11-01 VITALS
HEIGHT: 60 IN | TEMPERATURE: 97.8 F | OXYGEN SATURATION: 97 % | BODY MASS INDEX: 22.62 KG/M2 | DIASTOLIC BLOOD PRESSURE: 72 MMHG | RESPIRATION RATE: 16 BRPM | HEART RATE: 92 BPM | SYSTOLIC BLOOD PRESSURE: 131 MMHG | WEIGHT: 115.2 LBS

## 2021-11-01 DIAGNOSIS — R42 VERTIGO: ICD-10-CM

## 2021-11-01 DIAGNOSIS — R42 DIZZINESS: Primary | ICD-10-CM

## 2021-11-01 LAB
ALBUMIN SERPL-MCNC: 4.4 G/DL (ref 3.5–5.2)
ALBUMIN/GLOB SERPL: 1.5 G/DL
ALP SERPL-CCNC: 101 U/L (ref 39–117)
ALT SERPL W P-5'-P-CCNC: 10 U/L (ref 1–33)
ANION GAP SERPL CALCULATED.3IONS-SCNC: 8.4 MMOL/L (ref 5–15)
AST SERPL-CCNC: 15 U/L (ref 1–32)
BACTERIA UR QL AUTO: ABNORMAL /HPF
BASOPHILS # BLD AUTO: 0.05 10*3/MM3 (ref 0–0.2)
BASOPHILS NFR BLD AUTO: 0.9 % (ref 0–1.5)
BILIRUB SERPL-MCNC: 0.3 MG/DL (ref 0–1.2)
BILIRUB UR QL STRIP: NEGATIVE
BUN SERPL-MCNC: 9 MG/DL (ref 8–23)
BUN/CREAT SERPL: 12.9 (ref 7–25)
CALCIUM SPEC-SCNC: 10.1 MG/DL (ref 8.6–10.5)
CHLORIDE SERPL-SCNC: 102 MMOL/L (ref 98–107)
CLARITY UR: ABNORMAL
CO2 SERPL-SCNC: 29.6 MMOL/L (ref 22–29)
COLOR UR: YELLOW
CREAT SERPL-MCNC: 0.7 MG/DL (ref 0.57–1)
DEPRECATED RDW RBC AUTO: 40 FL (ref 37–54)
EOSINOPHIL # BLD AUTO: 0.27 10*3/MM3 (ref 0–0.4)
EOSINOPHIL NFR BLD AUTO: 5 % (ref 0.3–6.2)
ERYTHROCYTE [DISTWIDTH] IN BLOOD BY AUTOMATED COUNT: 11.9 % (ref 12.3–15.4)
GFR SERPL CREATININE-BSD FRML MDRD: 81 ML/MIN/1.73
GLOBULIN UR ELPH-MCNC: 2.9 GM/DL
GLUCOSE SERPL-MCNC: 100 MG/DL (ref 65–99)
GLUCOSE UR STRIP-MCNC: NEGATIVE MG/DL
HCT VFR BLD AUTO: 38.5 % (ref 34–46.6)
HGB BLD-MCNC: 12.8 G/DL (ref 12–15.9)
HGB UR QL STRIP.AUTO: NEGATIVE
HOLD SPECIMEN: NORMAL
HOLD SPECIMEN: NORMAL
HYALINE CASTS UR QL AUTO: ABNORMAL /LPF
IMM GRANULOCYTES # BLD AUTO: 0.01 10*3/MM3 (ref 0–0.05)
IMM GRANULOCYTES NFR BLD AUTO: 0.2 % (ref 0–0.5)
KETONES UR QL STRIP: NEGATIVE
LEUKOCYTE ESTERASE UR QL STRIP.AUTO: ABNORMAL
LYMPHOCYTES # BLD AUTO: 1.01 10*3/MM3 (ref 0.7–3.1)
LYMPHOCYTES NFR BLD AUTO: 18.6 % (ref 19.6–45.3)
MAGNESIUM SERPL-MCNC: 2 MG/DL (ref 1.6–2.4)
MCH RBC QN AUTO: 30.8 PG (ref 26.6–33)
MCHC RBC AUTO-ENTMCNC: 33.2 G/DL (ref 31.5–35.7)
MCV RBC AUTO: 92.5 FL (ref 79–97)
MONOCYTES # BLD AUTO: 0.52 10*3/MM3 (ref 0.1–0.9)
MONOCYTES NFR BLD AUTO: 9.6 % (ref 5–12)
NEUTROPHILS NFR BLD AUTO: 3.57 10*3/MM3 (ref 1.7–7)
NEUTROPHILS NFR BLD AUTO: 65.7 % (ref 42.7–76)
NITRITE UR QL STRIP: NEGATIVE
NRBC BLD AUTO-RTO: 0 /100 WBC (ref 0–0.2)
PH UR STRIP.AUTO: 8 [PH] (ref 5–8)
PLATELET # BLD AUTO: 347 10*3/MM3 (ref 140–450)
PMV BLD AUTO: 9.9 FL (ref 6–12)
POTASSIUM SERPL-SCNC: 4.1 MMOL/L (ref 3.5–5.2)
PROT SERPL-MCNC: 7.3 G/DL (ref 6–8.5)
PROT UR QL STRIP: NEGATIVE
RBC # BLD AUTO: 4.16 10*6/MM3 (ref 3.77–5.28)
RBC # UR: ABNORMAL /HPF
RBC MORPH BLD: NORMAL
REF LAB TEST METHOD: ABNORMAL
SMALL PLATELETS BLD QL SMEAR: ADEQUATE
SODIUM SERPL-SCNC: 140 MMOL/L (ref 136–145)
SP GR UR STRIP: <=1.005 (ref 1–1.03)
SQUAMOUS #/AREA URNS HPF: ABNORMAL /HPF
TROPONIN T SERPL-MCNC: <0.01 NG/ML (ref 0–0.03)
UROBILINOGEN UR QL STRIP: ABNORMAL
WBC # BLD AUTO: 5.43 10*3/MM3 (ref 3.4–10.8)
WBC MORPH BLD: NORMAL
WBC UR QL AUTO: ABNORMAL /HPF
WHOLE BLOOD HOLD SPECIMEN: NORMAL
WHOLE BLOOD HOLD SPECIMEN: NORMAL

## 2021-11-01 PROCEDURE — 99284 EMERGENCY DEPT VISIT MOD MDM: CPT

## 2021-11-01 PROCEDURE — 84484 ASSAY OF TROPONIN QUANT: CPT | Performed by: EMERGENCY MEDICINE

## 2021-11-01 PROCEDURE — 25010000002 DIAZEPAM PER 5 MG: Performed by: EMERGENCY MEDICINE

## 2021-11-01 PROCEDURE — 80053 COMPREHEN METABOLIC PANEL: CPT | Performed by: EMERGENCY MEDICINE

## 2021-11-01 PROCEDURE — 85007 BL SMEAR W/DIFF WBC COUNT: CPT | Performed by: EMERGENCY MEDICINE

## 2021-11-01 PROCEDURE — 93005 ELECTROCARDIOGRAM TRACING: CPT | Performed by: EMERGENCY MEDICINE

## 2021-11-01 PROCEDURE — 71045 X-RAY EXAM CHEST 1 VIEW: CPT

## 2021-11-01 PROCEDURE — 81001 URINALYSIS AUTO W/SCOPE: CPT | Performed by: EMERGENCY MEDICINE

## 2021-11-01 PROCEDURE — 85025 COMPLETE CBC W/AUTO DIFF WBC: CPT | Performed by: EMERGENCY MEDICINE

## 2021-11-01 PROCEDURE — 70450 CT HEAD/BRAIN W/O DYE: CPT

## 2021-11-01 PROCEDURE — 96374 THER/PROPH/DIAG INJ IV PUSH: CPT

## 2021-11-01 PROCEDURE — 83735 ASSAY OF MAGNESIUM: CPT | Performed by: EMERGENCY MEDICINE

## 2021-11-01 RX ORDER — SODIUM CHLORIDE 0.9 % (FLUSH) 0.9 %
10 SYRINGE (ML) INJECTION AS NEEDED
Status: DISCONTINUED | OUTPATIENT
Start: 2021-11-01 | End: 2021-11-01 | Stop reason: HOSPADM

## 2021-11-01 RX ORDER — MECLIZINE HYDROCHLORIDE 25 MG/1
25 TABLET ORAL 3 TIMES DAILY PRN
Qty: 12 TABLET | Refills: 0 | Status: SHIPPED | OUTPATIENT
Start: 2021-11-01 | End: 2023-03-08

## 2021-11-01 RX ORDER — DIAZEPAM 5 MG/ML
2.5 INJECTION, SOLUTION INTRAMUSCULAR; INTRAVENOUS ONCE
Status: COMPLETED | OUTPATIENT
Start: 2021-11-01 | End: 2021-11-01

## 2021-11-01 RX ORDER — MECLIZINE HYDROCHLORIDE 25 MG/1
25 TABLET ORAL ONCE
Status: COMPLETED | OUTPATIENT
Start: 2021-11-01 | End: 2021-11-01

## 2021-11-01 RX ADMIN — DIAZEPAM 2.5 MG: 5 INJECTION, SOLUTION INTRAMUSCULAR; INTRAVENOUS at 15:36

## 2021-11-01 RX ADMIN — MECLIZINE HYDROCHLORIDE 25 MG: 25 TABLET ORAL at 13:48

## 2021-11-01 NOTE — ED PROVIDER NOTES
"Subjective   Nadira Ellis is a 76-year-old female with a past medical history significant for vertigo, eustachian tube dysfunction, and impacted cerumen who presents to [facility] ED for chief complaint of shortness of air and dizziness.  Patient indicates that when she first first woke up this morning she felt extremely lightheaded and dizzy, indicating that the room was spinning like a \"merry go round\", she could not lift her head off the bed at this time.  Patient also reports a \"funny feeling in my head\", and indicates associated symptoms of sharp epigastric abdominal pain that went away shortly after the episode ended.  Patient reports she has never had this sharp epigastric abdominal pain before.  Patient does indicate she has had shortness of air in the past, and it comes and goes.  Patient reports that shortness of air is her biggest concern today, and indicates she would like to rule out a cardiac cause.  Patient reports her dyspnea occurs both at rest and with exertion.  Patient is satting in the upper 90s at room air upon presentation to the ED.  Patient denies orthopnea or paroxysmal nocturnal dyspnea.  Patient indicates seeing ENT approximately once yearly for an ear exam, but indicates she has never been given any medications by them.  Patient denies nausea, vomiting.  She indicates her dizziness did not improve with change in position.      History provided by:  Patient   used: No        Review of Systems   Respiratory: Positive for shortness of breath.    Gastrointestinal: Positive for abdominal pain.   Neurological: Positive for dizziness and light-headedness.   Psychiatric/Behavioral: The patient is nervous/anxious.    All other systems reviewed and are negative.      Past Medical History:   Diagnosis Date   • Osteoporosis    • Ulcer        Allergies   Allergen Reactions   • Sulfa Antibiotics        Past Surgical History:   Procedure Laterality Date   • " COLONOSCOPY     • EXPLORATORY LAPAROTOMY N/A 6/26/2017    Procedure: LAPAROTOMY EXPLORATORY WITH LYSIS OF ADHESIONS; REDUCTION OF HERNIA  ;  Surgeon: Sami Griffiths MD;  Location: [facility] OR;  Service:    • HIP TROCHANTERIC NAILING WITH INTRAMEDULLARY HIP SCREW Right 4/12/2020    Procedure: HIP TROCHANTERIC NAILING WITH INTRAMEDULLARY HIP SCREW;  Surgeon: Kash Akins Jr., MD;  Location: [facility] OR;  Service: Orthopedics;  Laterality: Right;   • TUBAL ABDOMINAL LIGATION         History reviewed. No pertinent family history.    Social History     Socioeconomic History   • Marital status:    Tobacco Use   • Smoking status: Never Smoker   • Smokeless tobacco: Never Used   Vaping Use   • Vaping Use: Never used   Substance and Sexual Activity   • Alcohol use: No   • Drug use: No   • Sexual activity: Defer           Objective   Physical Exam  Vitals and nursing note reviewed.   Constitutional:       General: She is not in acute distress.     Appearance: Normal appearance. She is well-developed. She is not ill-appearing.   HENT:      Head: Normocephalic and atraumatic.      Right Ear: Tympanic membrane and external ear normal. There is no impacted cerumen.      Left Ear: Tympanic membrane and external ear normal. There is no impacted cerumen.      Mouth/Throat:      Mouth: Mucous membranes are moist.   Eyes:      Conjunctiva/sclera: Conjunctivae normal.      Pupils: Pupils are equal, round, and reactive to light.   Cardiovascular:      Rate and Rhythm: Normal rate and regular rhythm.      Heart sounds: Normal heart sounds.   Pulmonary:      Effort: Pulmonary effort is normal. No respiratory distress.      Breath sounds: Normal breath sounds.   Abdominal:      General: Bowel sounds are normal.      Palpations: Abdomen is soft.      Tenderness: There is no abdominal tenderness.   Musculoskeletal:         General: Normal range of motion.      Cervical back: Normal range of motion and neck supple.   Skin:      General: Skin is warm and dry.   Neurological:      Mental Status: She is alert and oriented to person, place, and time.      Deep Tendon Reflexes: Reflexes are normal and symmetric.   Psychiatric:         Mood and Affect: Mood normal.         Behavior: Behavior normal.         Thought Content: Thought content normal.         Procedures           ED Course  ED Course as of 11/01/21 1620   Mon Nov 01, 2021   1249 EKG interpreted by me: Sinus rhythm, normal rate, no acute ST/T changes, low-voltage QRS complexes, this is an atypical EKG [MP]   1325 ECG 12 Lead [CS]      ED Course User Index  [CS] Avtar Melgar Jr., PA-C  [MP] Ralph Martin MD                                           MDM  Number of Diagnoses or Management Options  Dizziness: new and requires workup  Vertigo: new and requires workup     Amount and/or Complexity of Data Reviewed  Clinical lab tests: reviewed  Tests in the radiology section of CPT®: reviewed    Risk of Complications, Morbidity, and/or Mortality  Presenting problems: minimal  Diagnostic procedures: minimal  Management options: minimal    Patient Progress  Patient progress: stable      Final diagnoses:   Dizziness   Vertigo       ED Disposition  ED Disposition     ED Disposition Condition Comment    Discharge Stable           [facility] Emergency Department  [address] [address] [phone]    If symptoms worsen         Medication List      New Prescriptions    meclizine 25 MG tablet  Commonly known as: ANTIVERT  Take 1 tablet by mouth 3 (Three) Times a Day As Needed for Dizziness.           Where to Get Your Medications      These medications were sent to [facility] [phone] [facility] [phone] [address] [address]    Phone: [phone]   · meclizine 25 MG tablet          Avtar Melgar Jr., PA-C  11/01/21 1620

## 2022-01-11 DIAGNOSIS — Z01.812 BLOOD TESTS PRIOR TO TREATMENT OR PROCEDURE: Primary | ICD-10-CM

## 2022-01-12 ENCOUNTER — CLINICAL SUPPORT NO REQUIREMENTS (OUTPATIENT)
Dept: PULMONOLOGY | Facility: CLINIC | Age: 77
End: 2022-01-12

## 2022-01-12 DIAGNOSIS — Z01.812 BLOOD TESTS PRIOR TO TREATMENT OR PROCEDURE: ICD-10-CM

## 2022-01-12 PROCEDURE — 99211 OFF/OP EST MAY X REQ PHY/QHP: CPT | Performed by: INTERNAL MEDICINE

## 2022-01-12 PROCEDURE — U0004 COV-19 TEST NON-CDC HGH THRU: HCPCS | Performed by: INTERNAL MEDICINE

## 2022-01-13 LAB — SARS-COV-2 RNA NOSE QL NAA+PROBE: NOT DETECTED

## 2022-01-14 ENCOUNTER — OFFICE VISIT (OUTPATIENT)
Dept: PULMONOLOGY | Facility: CLINIC | Age: 77
End: 2022-01-14

## 2022-01-14 VITALS
OXYGEN SATURATION: 99 % | TEMPERATURE: 98.7 F | DIASTOLIC BLOOD PRESSURE: 80 MMHG | RESPIRATION RATE: 18 BRPM | SYSTOLIC BLOOD PRESSURE: 132 MMHG | HEIGHT: 60 IN | WEIGHT: 111 LBS | HEART RATE: 77 BPM | BODY MASS INDEX: 21.79 KG/M2

## 2022-01-14 DIAGNOSIS — J44.9 CHRONIC OBSTRUCTIVE PULMONARY DISEASE, UNSPECIFIED COPD TYPE: Primary | ICD-10-CM

## 2022-01-14 DIAGNOSIS — R06.02 SOB (SHORTNESS OF BREATH): ICD-10-CM

## 2022-01-14 PROBLEM — K57.30 DIVERTICULOSIS OF COLON: Status: ACTIVE | Noted: 2021-06-21

## 2022-01-14 PROBLEM — Z87.81 HISTORY OF HIP FRACTURE: Status: ACTIVE | Noted: 2021-01-08

## 2022-01-14 PROCEDURE — 94729 DIFFUSING CAPACITY: CPT | Performed by: INTERNAL MEDICINE

## 2022-01-14 PROCEDURE — 94726 PLETHYSMOGRAPHY LUNG VOLUMES: CPT | Performed by: INTERNAL MEDICINE

## 2022-01-14 PROCEDURE — 94010 BREATHING CAPACITY TEST: CPT | Performed by: INTERNAL MEDICINE

## 2022-01-14 PROCEDURE — 99204 OFFICE O/P NEW MOD 45 MIN: CPT | Performed by: INTERNAL MEDICINE

## 2022-01-14 RX ORDER — LANSOPRAZOLE 30 MG/1
30 CAPSULE, DELAYED RELEASE ORAL DAILY
COMMUNITY

## 2022-01-14 RX ORDER — PANTOPRAZOLE SODIUM 40 MG/1
TABLET, DELAYED RELEASE ORAL
COMMUNITY
Start: 2020-04-17 | End: 2023-03-08

## 2022-01-14 NOTE — PROGRESS NOTES
Sorts Subjective:     Chief Complaint:   Chief Complaint   Patient presents with   • Shortness of Breath       HPI:    Prisca Ellis is a 76 y.o. female seen in consultation at the request of Tai Montelongo DO for evaluation of shortness of breath    She has a history of the intermittent sensation of shortness of breath.  She describes it as the sensation of not being able to get a deep breath.  She has always attributed it to anxiety as she is under a lot of stress at home with her 's prostate cancer.  On good days she is not limited at all except by her right hip but on the days she feels short of breath she can feel this way even at rest.    She notes no increased cough.  Her sputum is normal.  She complained of occasional chest pain and had a recent negative nuclear test last month per the patient's report.  She notes no lower extremity edema.  She is a lifelong non-smoker.    She presented to the ER in [place] several months ago for dizziness and lightheadedness.  She was diagnosed with vertigo.  The x-ray reported the presence of emphysema and she was advised to get a pulmonary consultation.    Current medications are:   Current Outpatient Medications:   •  lansoprazole (PREVACID) 30 MG capsule, lansoprazole 30 mg capsule,delayed release  Take 1 capsule every day by oral route., Disp: , Rfl:   •  meclizine (ANTIVERT) 25 MG tablet, Take 1 tablet by mouth 3 (Three) Times a Day As Needed for Dizziness., Disp: 12 tablet, Rfl: 0  •  Multiple Vitamins-Minerals (MULTIVITAMIN PO), Take 1 tablet by mouth Daily., Disp: , Rfl:   •  pantoprazole (PROTONIX) 40 MG EC tablet, Take 40 mg by mouth Daily., Disp: , Rfl:   •  pantoprazole (PROTONIX) 40 MG EC tablet, Take 1 tablet every day by oral route., Disp: , Rfl:   •  risedronate (Actonel) 35 MG tablet, Take 35 mg by mouth Every 7 (Seven) Days. with water on empty stomach, nothing by mouth or lie down for next 30 minutes. On Fridays., Disp: , Rfl:   •  vitamin B-12  (CYANOCOBALAMIN) 1000 MCG tablet, Take 1,000 mcg by mouth Daily., Disp: , Rfl: .      The patient's relevant past medical, surgical, family and social history were reviewed and updated in Epic as appropriate.     ROS:    Review of Systems  ROS documented in patient questionnaire ×14 systems.  Reviewed with patient.  Otherwise negative except as noted in HPI.    Objective:    Physical Exam  Vitals and nursing note reviewed.   Constitutional:       Appearance: Normal appearance. She is well-developed.   HENT:      Head: Normocephalic and atraumatic.      Nose: Nose normal.      Mouth/Throat:      Mouth: Mucous membranes are moist.      Pharynx: Oropharynx is clear. No oropharyngeal exudate.   Eyes:      General: No scleral icterus.     Conjunctiva/sclera: Conjunctivae normal.   Neck:      Thyroid: No thyromegaly.      Trachea: No tracheal deviation.   Cardiovascular:      Rate and Rhythm: Normal rate and regular rhythm.      Heart sounds: No murmur heard.  No friction rub. No gallop.    Pulmonary:      Effort: Pulmonary effort is normal. No respiratory distress.      Breath sounds: No wheezing or rales.   Musculoskeletal:         General: No deformity. Normal range of motion.   Skin:     General: Skin is warm and dry.      Findings: No rash.   Neurological:      Mental Status: She is alert and oriented to person, place, and time.   Psychiatric:         Behavior: Behavior normal.         Thought Content: Thought content normal.         Data:     PFT: Normal spirometry lung volumes and diffusing capacity.    CXR: Normal heart size.  Lungs appear large in relation to the chest but no clear hyperinflation.    I independently reviewed CT images of the chest and thoracic spine as described below    Assessment:    Problem List Items Addressed This Visit        Pulmonary Problems    SOB (shortness of breath)      Other Visit Diagnoses     Chronic obstructive pulmonary disease, unspecified COPD type (HCC)    -  Primary     Relevant Orders    XR Chest PA & Lateral    Pulmonary Function Test (Completed)          76-year-old female with intermittent shortness of breath without an apparent pulmonary cause.  She was recently told she might have emphysema based upon a portable chest x-ray but that, on closer examination, is not present.  She has normal PFTs, normal exam, and normal appearance of lungs on chest CT 5 years ago and also on a thoracic spine CT about 2 years ago.  As such, a diagnosis of emphysema is invalid.  Oxygen saturations are 99% on room air.    She is under the suspicion that her shortness of breath is nonphysiologic and may be related to stress and anxiety related to what she is going to at home with her 's cancer.  I think this is a valid assessment.  I do not think she warrants further pulmonary work-up or any empiric pulmonary medications as I doubt they would help.    Plan:     1. I spent a fair amount of time showing her her test results and images and reassuring her  2. I would be happy to see her in the future at any time as she or Dr. Montelongo sees fit    Level of Risk Moderate due to: undiagnosed new problem    Signed by  Dylon Mclaughlin MD

## 2023-01-11 PROBLEM — I49.1 PREMATURE ATRIAL CONTRACTIONS: Status: ACTIVE | Noted: 2023-01-11

## 2023-01-11 PROBLEM — R42 DIZZINESS: Status: ACTIVE | Noted: 2023-01-11

## 2023-03-06 ENCOUNTER — DOCUMENTATION (OUTPATIENT)
Dept: CARDIOLOGY | Facility: CLINIC | Age: 78
End: 2023-03-06
Payer: MEDICARE

## 2023-03-06 NOTE — PROGRESS NOTES
CONFIRMED APPT     pcp notes under media     Stress, echo, carotid (requested), heart monitor (requested better copy)    Requested labs    Has not seen cardiologist- pcp ordered testing

## 2023-03-07 PROBLEM — I10 ESSENTIAL HYPERTENSION: Status: ACTIVE | Noted: 2023-03-07

## 2023-03-07 PROBLEM — E78.5 DYSLIPIDEMIA: Status: ACTIVE | Noted: 2023-03-07

## 2023-03-07 NOTE — PROGRESS NOTES
[facility] Cardiology    Encounter Date: 2023    Patient ID: Magy Ellis is a 77 y.o. female.  : 1945     PCP: Tai Montelongo DO       Chief Complaint: Establish Care ([place])      PROBLEM LIST:  1. Dizziness  a. Echo 10/2021: normal EF, no significant valve abnormality  b. Carotid duplex 2021: Less than 50% bilateral carotid stenosis, antegrade flow no evidence of subclavian steal  c. 7-day Holter 2022: Normal sinus rhythm at 70 bpm, 3.6% PAC burden, short burst of SVT, rare PVCs, less than 1%.  No atrial fibrillation, pauses or heart block  d. Normal filippo stress 2022  2. Hypertension  3. Dyslipidemia  4. History of diverticulosis    History of Present Illness: Magy Ellis is a 77 y.o. female who presents to the cardiology office today by referral from her PCP to be seen in consultation for shortness of air and palpitations. She admits to being under a lot of stress as her  is struggling with pain due to his cancer and her grandson calls often with issues. She states she has been having daily episodes of palpitations that come on randomly when she is resting and active, last a minute or so and feel like a fluttering in her heart. During these episodes, she feels like she can't take a full breath and feels some lightheadedness. She denies any chest pain, presyncope, syncope or nausea being associated with these episodes. She is wondering if these episodes are related to her anxiety or if they are of a cardiac cause. Of note, a few months ago she had an episode of elevated blood pressure with her systolic in the 170s that was associated with dizziness, shortness of breath and a headache. Her  called 911 and the paramedics who arrived evaluated her and did not feel the need to send her to the hospital as she was able to lower her blood pressure as she calmed herself. She saw her PCP after this episode who did a 7day Holter monitor study as  well as prescribing her hydroxyzine, nitroglycerin and losartan to take as needed but denies ever taking any of them as her blood pressure has been normal in the 110s systolic over the 70s diastolic generally. Her Holter monitor overall was insignificant. She does keep a blood pressure log and states that her blood pressure has continued to be normal.     Past Medical History:   Past Medical History:   Diagnosis Date   • Acid reflux    • Arthritis    • Heart murmur    • Osteoporosis    • Ulcer        Past Surgical History:   Past Surgical History:   Procedure Laterality Date   • COLONOSCOPY     • EXPLORATORY LAPAROTOMY N/A 6/26/2017    Procedure: LAPAROTOMY EXPLORATORY WITH LYSIS OF ADHESIONS; REDUCTION OF HERNIA  ;  Surgeon: Sami Griffiths MD;  Location: [facility] OR;  Service:    • HIP TROCHANTERIC NAILING WITH INTRAMEDULLARY HIP SCREW Right 4/12/2020    Procedure: HIP TROCHANTERIC NAILING WITH INTRAMEDULLARY HIP SCREW;  Surgeon: Kash Akins Jr., MD;  Location: [facility] OR;  Service: Orthopedics;  Laterality: Right;   • TUBAL ABDOMINAL LIGATION         Family History:   Family History   Problem Relation Age of Onset   • No Known Problems Mother    • Throat cancer Father    • No Known Problems Sister    • No Known Problems Sister    • No Known Problems Brother    • No Known Problems Daughter    • No Known Problems Son    • No Known Problems Son        Social History:   Social History     Socioeconomic History   • Marital status:    Tobacco Use   • Smoking status: Never   • Smokeless tobacco: Never   Vaping Use   • Vaping Use: Never used   Substance and Sexual Activity   • Alcohol use: No   • Drug use: No   • Sexual activity: Defer       Tobacco History:   Social History     Tobacco Use   Smoking Status Never   Smokeless Tobacco Never       Medications:     Current Outpatient Medications:   •  Ascorbic Acid (Vitamin C) 500 MG chewable tablet, Chew 500 mg Daily., Disp: , Rfl:   •  lansoprazole (PREVACID) 30  "MG capsule, Take 1 capsule by mouth Daily., Disp: , Rfl:   •  Multiple Vitamins-Minerals (MULTIVITAMIN PO), Take 1 tablet by mouth Daily., Disp: , Rfl:   •  Probiotic Product (Probiotic-10) chewable tablet, Chew 1 tablet Daily., Disp: , Rfl:   •  risedronate (ACTONEL) 35 MG tablet, Take 1 tablet by mouth Every 7 (Seven) Days. with water on empty stomach, nothing by mouth or lie down for next 30 minutes. On Fridays., Disp: , Rfl:   •  vitamin B-12 (CYANOCOBALAMIN) 1000 MCG tablet, Take 1 tablet by mouth Daily., Disp: , Rfl:   •  Vitamin D, Cholecalciferol, 50 MCG (2000 UT) capsule, Take 1 capsule by mouth Daily., Disp: , Rfl:     Allergies:   Allergies   Allergen Reactions   • Sulfa Antibiotics Hives     The following portions of the patient's history were reviewed and updated as appropriate: allergies, current medications, past family history, past medical history, past social history, past surgical history and problem list.    Review of Systems   12 point review of systems negative except for that listed in the HPI        Objective:     /72 (BP Location: Left arm, Patient Position: Sitting, Cuff Size: Adult)   Pulse 76   Ht 152.4 cm (60\")   Wt 47.8 kg (105 lb 6.4 oz)   SpO2 99%   BMI 20.58 kg/m²      Physical Exam  Constitutional: Patient appears well-developed and well-nourished.   HENT: HEENT exam unremarkable.   Neck: Neck supple. No JVD present. No carotid bruits.   Cardiovascular: Normal rate, regular rhythm and normal heart sounds. Faint Murmur.   2+ symmetric pulses.   Pulmonary/Chest: Breath sounds normal. Does not exhibit tenderness.   Abdominal: Abdomen benign.   Musculoskeletal: Does not exhibit edema.   Neurological: No focal deficits  Vitals reviewed.    Data Review:   Lab review 9/14/2022  CBC: WBC 4.2, Hgb 12.5, HCT 31.8,   TSH 2.62  Lipid panel: , , HDL 85,   CMP: GLU 97, BUN 13, creatinine 0.88, , K4.4, CL 99, CO2 30, CA 10.4, AST 15, ALT 11      ECG 12 " Lead    Date/Time: 3/8/2023 3:17 PM  Performed by: Nissa Fountain PA-C  Authorized by: Nissa Fountain PA-C   Comparison: compared with previous ECG from 11/1/2021  Rhythm: sinus rhythm  Rate: normal  BPM: 76  Other findings: non-specific ST-T wave changes    Clinical impression: abnormal EKG  Comments: Non-specific ST-T wave changes. Abnormal EKG.               Assessment:      Diagnosis   1. Palpitations    2. Essential hypertension    3. Dyslipidemia      Plan:   1. Begin hydroxyzine as needed for anxious/palpitations  2. If palpitations worsen or persist, will consider addition of beta blocker  3. Continue taking current medications  4. Follow-up in 6 months, sooner if needed.      Nissa Fountain PA-C scribing for Dr. Staci Gomez.    Part of this note may be an electronic transcription/translation of spoken language to printed text using the Dragon Dictation System.      I Staci Gomez MD personally performed the services described in this documentation as scribed by the above individual in my presence, and it is both accurate and complete.    Staci Gomez MD, [facility]

## 2023-03-08 ENCOUNTER — OFFICE VISIT (OUTPATIENT)
Dept: CARDIOLOGY | Facility: CLINIC | Age: 78
End: 2023-03-08
Payer: MEDICARE

## 2023-03-08 VITALS
SYSTOLIC BLOOD PRESSURE: 120 MMHG | HEIGHT: 60 IN | HEART RATE: 76 BPM | DIASTOLIC BLOOD PRESSURE: 72 MMHG | BODY MASS INDEX: 20.69 KG/M2 | OXYGEN SATURATION: 99 % | WEIGHT: 105.4 LBS

## 2023-03-08 DIAGNOSIS — I10 ESSENTIAL HYPERTENSION: ICD-10-CM

## 2023-03-08 DIAGNOSIS — R00.2 PALPITATIONS: Primary | ICD-10-CM

## 2023-03-08 DIAGNOSIS — E78.5 DYSLIPIDEMIA: ICD-10-CM

## 2023-03-08 PROCEDURE — 3074F SYST BP LT 130 MM HG: CPT | Performed by: INTERNAL MEDICINE

## 2023-03-08 PROCEDURE — 1159F MED LIST DOCD IN RCRD: CPT | Performed by: INTERNAL MEDICINE

## 2023-03-08 PROCEDURE — 1160F RVW MEDS BY RX/DR IN RCRD: CPT | Performed by: INTERNAL MEDICINE

## 2023-03-08 PROCEDURE — 3078F DIAST BP <80 MM HG: CPT | Performed by: INTERNAL MEDICINE

## 2023-03-08 PROCEDURE — 99203 OFFICE O/P NEW LOW 30 MIN: CPT | Performed by: INTERNAL MEDICINE

## 2023-03-08 PROCEDURE — 93000 ELECTROCARDIOGRAM COMPLETE: CPT

## 2023-03-08 RX ORDER — MULTIVIT-MIN/IRON/FOLIC ACID/K 18-600-40
2000 CAPSULE ORAL DAILY
COMMUNITY

## 2023-07-11 NOTE — ANESTHESIA PROCEDURE NOTES
Peripheral Block      Patient reassessed immediately prior to procedure    Patient location during procedure: pre-op  Reason for block: procedure for pain and at surgeon's request  Performed by  Anesthesiologist: Rosalind Montero MD  Preanesthetic Checklist  Completed: patient identified, site marked, surgical consent, pre-op evaluation, timeout performed, IV checked, risks and benefits discussed and monitors and equipment checked  Prep:  Pt Position: supine  Sterile barriers:cap, gloves and mask  Prep: ChloraPrep  Patient monitoring: blood pressure monitoring, continuous pulse oximetry and EKG  Procedure  Sedation:no  Performed under: local infiltration  Guidance:ultrasound guided  Images:still images obtained, printed/placed on chart    Laterality:right  Block Type:fascia iliaca catheter  Injection Technique:catheter  Needle Type:echogenic  Needle Gauge:18 G  Resistance on Injection: none  Catheter Size:20 G (20g)    Medications Used: ropivacaine (NAROPIN) 0.2 % injection, 20 mL      Medications  Preservative Free Saline:10ml  Comment:20 ml of 0.25% ropivacaine given-pt received FI block in ER with previous LA infused    Post Assessment  Injection Assessment: negative aspiration for heme, no paresthesia on injection and incremental injection  Patient Tolerance:comfortable throughout block  Complications:no  Additional Notes  Procedure:                 Pt placed in supine position.   The insertion site was prepped in sterile fashion with Chlorapreop and clear plastic drapes.  Analgesia was provided by skin infiltration at insertion site with Lidocaine 1% 3mls.  A B-Preston 18 g , 4 inch echogenic Touhy needle was advance In-plane under ultrasound guidance. The   Anterior superior Iliac crest was initially visualized and the probe was directed slightly medially and slightly towards the umbilicus.  The course of the needle was tracked over the sartorius muscle through the fascia Iliacus and into the anterior portion  of the Iliacus muscle.  Major vessels where identified and avoided as where structures of the peritoneal cavity.  LA injection was made incrementally in 1-5ml amounts spread was visualized superiorly below fascia iliacus.  Injection was completed with negative aspiration of blood and negative intravascular injection.  Injection pressures where normal or minimal resistance.  A 20 g B-Preston wire styleted catheter was then advance thru the needle and very easily placed in a superior or cephalad direction.  The catheter was secured at insertion site with skin afix , mastisol, steristreps.  A CHG tegaderm dressing was placed over the insertion site and the nerve catheter labeled and capped.  Thank You.               Bed in lowest position, wheels locked, appropriate side rails in place/Call bell, personal items and telephone in reach/Instruct patient to call for assistance before getting out of bed or chair/Non-slip footwear when patient is out of bed/Star to call system/Physically safe environment - no spills, clutter or unnecessary equipment/Purposeful Proactive Rounding/Room/bathroom lighting operational, light cord in reach

## 2023-10-10 ENCOUNTER — OFFICE VISIT (OUTPATIENT)
Dept: CARDIOLOGY | Facility: CLINIC | Age: 78
End: 2023-10-10
Payer: MEDICARE

## 2023-10-10 VITALS
BODY MASS INDEX: 20.34 KG/M2 | HEART RATE: 61 BPM | DIASTOLIC BLOOD PRESSURE: 60 MMHG | SYSTOLIC BLOOD PRESSURE: 130 MMHG | OXYGEN SATURATION: 93 % | HEIGHT: 60 IN | WEIGHT: 103.6 LBS

## 2023-10-10 DIAGNOSIS — I49.1 PREMATURE ATRIAL CONTRACTIONS: Primary | ICD-10-CM

## 2023-10-10 DIAGNOSIS — R42 DIZZINESS: ICD-10-CM

## 2023-10-10 DIAGNOSIS — E78.5 DYSLIPIDEMIA: ICD-10-CM

## 2023-10-10 PROCEDURE — 1160F RVW MEDS BY RX/DR IN RCRD: CPT | Performed by: PHYSICIAN ASSISTANT

## 2023-10-10 PROCEDURE — 3075F SYST BP GE 130 - 139MM HG: CPT | Performed by: PHYSICIAN ASSISTANT

## 2023-10-10 PROCEDURE — 1159F MED LIST DOCD IN RCRD: CPT | Performed by: PHYSICIAN ASSISTANT

## 2023-10-10 PROCEDURE — 3078F DIAST BP <80 MM HG: CPT | Performed by: PHYSICIAN ASSISTANT

## 2023-10-10 PROCEDURE — 99214 OFFICE O/P EST MOD 30 MIN: CPT | Performed by: PHYSICIAN ASSISTANT

## 2023-10-10 RX ORDER — FLUTICASONE PROPIONATE 50 MCG
1 SPRAY, SUSPENSION (ML) NASAL AS NEEDED
COMMUNITY
Start: 2023-08-28

## 2023-10-10 NOTE — PROGRESS NOTES
Follow-up Visit      Date: 10/10/2023  Patient Name: Magy Ellis  : 1945   MRN: [id number]     Chief Complaint:    Chief Complaint   Patient presents with    Palpitations       Problem List     Dizziness  Echo 10/2021: normal EF, no significant valve abnormality  Carotid duplex 2021: Less than 50% bilateral carotid stenosis, antegrade flow no evidence of subclavian steal  7-day Holter 2022: Normal sinus rhythm at 70 bpm, 3.6% PAC burden, short burst of SVT, rare PVCs, less than 1%.  No atrial fibrillation, pauses or heart block  Normal filippo stress 2022  Hypertension  Dyslipidemia  History of diverticulosis        Subjective     Allergies   Allergen Reactions    Sulfa Antibiotics Hives         Current Outpatient Medications:     Ascorbic Acid (Vitamin C) 500 MG chewable tablet, Chew 500 mg Daily., Disp: , Rfl:     fluticasone (FLONASE) 50 MCG/ACT nasal spray, 1 spray into the nostril(s) as directed by provider As Needed., Disp: , Rfl:     lansoprazole (PREVACID) 30 MG capsule, Take 1 capsule by mouth Daily., Disp: , Rfl:     Multiple Vitamins-Minerals (MULTIVITAMIN PO), Take 1 tablet by mouth Daily., Disp: , Rfl:     Probiotic Product (Probiotic-10) chewable tablet, Chew 1 tablet Daily., Disp: , Rfl:     risedronate (ACTONEL) 35 MG tablet, Take 1 tablet by mouth Every 7 (Seven) Days. with water on empty stomach, nothing by mouth or lie down for next 30 minutes. On ., Disp: , Rfl:     vitamin B-12 (CYANOCOBALAMIN) 1000 MCG tablet, Take 1 tablet by mouth Daily., Disp: , Rfl:     Vitamin D, Cholecalciferol, 50 MCG ( UT) capsule, Take 1 capsule by mouth Daily., Disp: , Rfl:      History of Present Illness: Magy Ellis is a 78 y.o. female who is here today for 6-month follow-up regarding palpitations and atypical chest pain.  Since her last visit patient has been doing well from a cardiac perspective.  Her  did pass away in April and she is slowly been getting back  "to a regular routine.  Patient has not been symptomatic with palpitations and has not had any atypical chest pain.  Patient states that she remains fairly active with doing housework at home without any issues with her heart.  She believes that a lot of the stress and palpitations were related to when her  was not doing well before his passing.    The following portions of the patient's history were reviewed and updated as appropriate: allergies, current medications and problem list.     Pertinent positives as listed in the HPI.  All other systems reviewed are negative.    Objective     Vitals:    10/10/23 1107   BP: 130/60   BP Location: Right arm   Patient Position: Sitting   Cuff Size: Adult   Pulse: 61   SpO2: 93%   Weight: 47 kg (103 lb 9.6 oz)   Height: 152.4 cm (60\")     Body mass index is 20.23 kg/mý.    Physical Exam   Constitutional: Thin build, no acute distress  HENT: Normocephalic, atraumatic moist oral mucosa  Neck: Neck supple. No JVD present. No carotid bruits.   Cardiovascular: Regular rate, regular rhythm and normal heart sounds. No murmur heard.   Pulmonary/Chest: Clear to auscultation bilaterally without wheezing, rhonchi, or rails  Abdominal: Nondistended  Musculoskeletal: No obvious deformity  Neurological: Alert and oriented x3, no focal deficits  Extremities: No peripheral edema, palpable PT pulses bilaterally    Lab Review:   CMP:   Lab Results   Component Value Date    GLUCOSE 100 (H) 11/01/2021    BUN 9 11/01/2021    CREATININE 0.70 11/01/2021    EGFRIFNONA 81 11/01/2021    BCR 12.9 11/01/2021    K 4.1 11/01/2021    CO2 29.6 (H) 11/01/2021    CALCIUM 10.1 11/01/2021    ALBUMIN 4.40 11/01/2021    AST 15 11/01/2021    ALT 10 11/01/2021       CBC:    Lab Results   Component Value Date    WBC 5.43 11/01/2021    HGB 12.8 11/01/2021    HCT 38.5 11/01/2021    MCV 92.5 11/01/2021     11/01/2021       LIPIDS:    No results found for: \"CHOL\", \"CHLPL\", \"TRIG\", \"HDL\", \"LDL\", " "\"LDLDIRECT\"    HgbA1c:    Lab Results   Component Value Date    HGBA1C 5.50 04/16/2020       Assessment / Plan    Assessment:  1. Premature atrial contractions    2. Dizziness    3. Dyslipidemia           Plan:  Continue current medications.  She needs follow-up with her primary care provider regarding her cholesterol as we do not have any recent blood work.  If she becomes symptomatic with her PACs again she can be placed on beta-blocker but at this time patient has rare occurrences of palpitations.      Smoking Cessation:       Advance Care Planning   ACP discussion was declined by the patient. Patient does not have an advance directive, declines further assistance.        Follow Up    Return in about 1 year (around 10/10/2024), or With [facility].    Farrah River PA-C          "

## 2024-01-05 ENCOUNTER — APPOINTMENT (OUTPATIENT)
Dept: GENERAL RADIOLOGY | Facility: HOSPITAL | Age: 79
End: 2024-01-05
Payer: MEDICARE

## 2024-01-05 ENCOUNTER — APPOINTMENT (OUTPATIENT)
Dept: MRI IMAGING | Facility: HOSPITAL | Age: 79
End: 2024-01-05
Payer: MEDICARE

## 2024-01-05 ENCOUNTER — HOSPITAL ENCOUNTER (EMERGENCY)
Facility: HOSPITAL | Age: 79
Discharge: HOME | End: 2024-01-05
Attending: EMERGENCY MEDICINE
Payer: MEDICARE

## 2024-01-05 VITALS
OXYGEN SATURATION: 98 % | RESPIRATION RATE: 18 BRPM | TEMPERATURE: 98.1 F | HEART RATE: 76 BPM | WEIGHT: 104 LBS | DIASTOLIC BLOOD PRESSURE: 70 MMHG | SYSTOLIC BLOOD PRESSURE: 135 MMHG | HEIGHT: 60 IN | BODY MASS INDEX: 20.42 KG/M2

## 2024-01-05 DIAGNOSIS — R20.2 PARESTHESIA: ICD-10-CM

## 2024-01-05 DIAGNOSIS — R07.9 ACUTE CHEST PAIN: Primary | ICD-10-CM

## 2024-01-05 LAB
ALBUMIN SERPL-MCNC: 5 G/DL (ref 3.5–5.2)
ALBUMIN/GLOB SERPL: 1.6 G/DL
ALP SERPL-CCNC: 91 U/L (ref 39–117)
ALT SERPL W P-5'-P-CCNC: 12 U/L (ref 1–33)
ANION GAP SERPL CALCULATED.3IONS-SCNC: 11 MMOL/L (ref 5–15)
AST SERPL-CCNC: 16 U/L (ref 1–32)
BASOPHILS # BLD AUTO: 0.05 10*3/MM3 (ref 0–0.2)
BASOPHILS NFR BLD AUTO: 1 % (ref 0–1.5)
BILIRUB SERPL-MCNC: 0.4 MG/DL (ref 0–1.2)
BILIRUB UR QL STRIP: NEGATIVE
BUN SERPL-MCNC: 12 MG/DL (ref 8–23)
BUN/CREAT SERPL: 16.2 (ref 7–25)
CALCIUM SPEC-SCNC: 10.3 MG/DL (ref 8.6–10.5)
CHLORIDE SERPL-SCNC: 102 MMOL/L (ref 98–107)
CLARITY UR: CLEAR
CO2 SERPL-SCNC: 29 MMOL/L (ref 22–29)
COLOR UR: YELLOW
CREAT SERPL-MCNC: 0.74 MG/DL (ref 0.57–1)
DEPRECATED RDW RBC AUTO: 40.5 FL (ref 37–54)
EGFRCR SERPLBLD CKD-EPI 2021: 82.4 ML/MIN/1.73
EOSINOPHIL # BLD AUTO: 0.16 10*3/MM3 (ref 0–0.4)
EOSINOPHIL NFR BLD AUTO: 3.2 % (ref 0.3–6.2)
ERYTHROCYTE [DISTWIDTH] IN BLOOD BY AUTOMATED COUNT: 11.9 % (ref 12.3–15.4)
FLUAV RNA RESP QL NAA+PROBE: NOT DETECTED
FLUBV RNA RESP QL NAA+PROBE: NOT DETECTED
GEN 5 2HR TROPONIN T REFLEX: <6 NG/L
GLOBULIN UR ELPH-MCNC: 3.1 GM/DL
GLUCOSE SERPL-MCNC: 102 MG/DL (ref 65–99)
GLUCOSE UR STRIP-MCNC: NEGATIVE MG/DL
HCT VFR BLD AUTO: 42.1 % (ref 34–46.6)
HGB BLD-MCNC: 13.9 G/DL (ref 12–15.9)
HGB UR QL STRIP.AUTO: NEGATIVE
HOLD SPECIMEN: NORMAL
IMM GRANULOCYTES # BLD AUTO: 0.01 10*3/MM3 (ref 0–0.05)
IMM GRANULOCYTES NFR BLD AUTO: 0.2 % (ref 0–0.5)
KETONES UR QL STRIP: NEGATIVE
LEUKOCYTE ESTERASE UR QL STRIP.AUTO: NEGATIVE
LIPASE SERPL-CCNC: 34 U/L (ref 13–60)
LYMPHOCYTES # BLD AUTO: 1.19 10*3/MM3 (ref 0.7–3.1)
LYMPHOCYTES NFR BLD AUTO: 23.9 % (ref 19.6–45.3)
MCH RBC QN AUTO: 31.2 PG (ref 26.6–33)
MCHC RBC AUTO-ENTMCNC: 33 G/DL (ref 31.5–35.7)
MCV RBC AUTO: 94.4 FL (ref 79–97)
MONOCYTES # BLD AUTO: 0.32 10*3/MM3 (ref 0.1–0.9)
MONOCYTES NFR BLD AUTO: 6.4 % (ref 5–12)
NEUTROPHILS NFR BLD AUTO: 3.24 10*3/MM3 (ref 1.7–7)
NEUTROPHILS NFR BLD AUTO: 65.3 % (ref 42.7–76)
NITRITE UR QL STRIP: NEGATIVE
NRBC BLD AUTO-RTO: 0 /100 WBC (ref 0–0.2)
NT-PROBNP SERPL-MCNC: 135.7 PG/ML (ref 0–1800)
PH UR STRIP.AUTO: 6.5 [PH] (ref 5–8)
PLATELET # BLD AUTO: 358 10*3/MM3 (ref 140–450)
PMV BLD AUTO: 9.6 FL (ref 6–12)
POTASSIUM SERPL-SCNC: 4.2 MMOL/L (ref 3.5–5.2)
PROT SERPL-MCNC: 8.1 G/DL (ref 6–8.5)
PROT UR QL STRIP: NEGATIVE
QT INTERVAL: 344 MS
QT INTERVAL: 368 MS
QTC INTERVAL: 404 MS
QTC INTERVAL: 427 MS
RBC # BLD AUTO: 4.46 10*6/MM3 (ref 3.77–5.28)
SARS-COV-2 RNA RESP QL NAA+PROBE: NOT DETECTED
SODIUM SERPL-SCNC: 142 MMOL/L (ref 136–145)
SP GR UR STRIP: 1.01 (ref 1–1.03)
TROPONIN T DELTA: NORMAL
TROPONIN T SERPL HS-MCNC: <6 NG/L
UROBILINOGEN UR QL STRIP: NORMAL
WBC NRBC COR # BLD AUTO: 4.97 10*3/MM3 (ref 3.4–10.8)
WHOLE BLOOD HOLD COAG: NORMAL
WHOLE BLOOD HOLD SPECIMEN: NORMAL

## 2024-01-05 PROCEDURE — 80053 COMPREHEN METABOLIC PANEL: CPT | Performed by: EMERGENCY MEDICINE

## 2024-01-05 PROCEDURE — 36415 COLL VENOUS BLD VENIPUNCTURE: CPT

## 2024-01-05 PROCEDURE — 83690 ASSAY OF LIPASE: CPT | Performed by: EMERGENCY MEDICINE

## 2024-01-05 PROCEDURE — 87636 SARSCOV2 & INF A&B AMP PRB: CPT | Performed by: NURSE PRACTITIONER

## 2024-01-05 PROCEDURE — 85025 COMPLETE CBC W/AUTO DIFF WBC: CPT | Performed by: EMERGENCY MEDICINE

## 2024-01-05 PROCEDURE — 83880 ASSAY OF NATRIURETIC PEPTIDE: CPT | Performed by: EMERGENCY MEDICINE

## 2024-01-05 PROCEDURE — 71045 X-RAY EXAM CHEST 1 VIEW: CPT

## 2024-01-05 PROCEDURE — 93005 ELECTROCARDIOGRAM TRACING: CPT | Performed by: EMERGENCY MEDICINE

## 2024-01-05 PROCEDURE — 84484 ASSAY OF TROPONIN QUANT: CPT | Performed by: EMERGENCY MEDICINE

## 2024-01-05 PROCEDURE — 99284 EMERGENCY DEPT VISIT MOD MDM: CPT

## 2024-01-05 PROCEDURE — 70551 MRI BRAIN STEM W/O DYE: CPT

## 2024-01-05 PROCEDURE — 93005 ELECTROCARDIOGRAM TRACING: CPT

## 2024-01-05 PROCEDURE — 81003 URINALYSIS AUTO W/O SCOPE: CPT | Performed by: NURSE PRACTITIONER

## 2024-01-05 RX ORDER — ASPIRIN 81 MG/1
81 TABLET ORAL DAILY
Qty: 30 TABLET | Refills: 1 | Status: SHIPPED | OUTPATIENT
Start: 2024-01-05

## 2024-01-05 RX ORDER — ASPIRIN 81 MG/1
324 TABLET, CHEWABLE ORAL ONCE
Status: COMPLETED | OUTPATIENT
Start: 2024-01-05 | End: 2024-01-05

## 2024-01-05 RX ORDER — SODIUM CHLORIDE 0.9 % (FLUSH) 0.9 %
10 SYRINGE (ML) INJECTION AS NEEDED
Status: DISCONTINUED | OUTPATIENT
Start: 2024-01-05 | End: 2024-01-05 | Stop reason: HOSPADM

## 2024-01-05 RX ADMIN — ASPIRIN 324 MG: 81 TABLET, CHEWABLE ORAL at 10:38

## 2024-01-05 NOTE — ED PROVIDER NOTES
Subjective   History of Present Illness  Pleasant patient who presents to the ER for chest pain difficulty breathing.  She tells me she has seen Dr. Gomez in the past.  Symptoms have been off and on for the last several days.  She also reports having some left-sided facial numbness yesterday that lasted several hours but currently resolved.  She denies any headache.  She denies any fevers or chills.        Review of Systems    Past Medical History:   Diagnosis Date    Acid reflux     Arthritis     Heart murmur     Osteoporosis     Ulcer        Allergies   Allergen Reactions    Sulfa Antibiotics Hives       Past Surgical History:   Procedure Laterality Date    COLONOSCOPY      EXPLORATORY LAPAROTOMY N/A 6/26/2017    Procedure: LAPAROTOMY EXPLORATORY WITH LYSIS OF ADHESIONS; REDUCTION OF HERNIA  ;  Surgeon: Sami Griffiths MD;  Location: [facility] OR;  Service:     HIP TROCHANTERIC NAILING WITH INTRAMEDULLARY HIP SCREW Right 4/12/2020    Procedure: HIP TROCHANTERIC NAILING WITH INTRAMEDULLARY HIP SCREW;  Surgeon: Kash Akins Jr., MD;  Location: [facility] OR;  Service: Orthopedics;  Laterality: Right;    TUBAL ABDOMINAL LIGATION         Family History   Problem Relation Age of Onset    No Known Problems Mother     Throat cancer Father     No Known Problems Sister     No Known Problems Sister     No Known Problems Brother     No Known Problems Daughter     No Known Problems Son     No Known Problems Son        Social History     Socioeconomic History    Marital status:    Tobacco Use    Smoking status: Never    Smokeless tobacco: Never   Vaping Use    Vaping Use: Never used   Substance and Sexual Activity    Alcohol use: No    Drug use: No    Sexual activity: Defer           Objective   Physical Exam  Constitutional:       Appearance: She is well-developed.   HENT:      Head: Normocephalic and atraumatic.      Right Ear: External ear normal.      Left Ear: External ear normal.      Nose: Nose normal.    Eyes:      Conjunctiva/sclera: Conjunctivae normal.      Pupils: Pupils are equal, round, and reactive to light.   Cardiovascular:      Rate and Rhythm: Normal rate and regular rhythm.      Heart sounds: Normal heart sounds.   Pulmonary:      Effort: Pulmonary effort is normal.      Breath sounds: Normal breath sounds.   Abdominal:      General: Bowel sounds are normal.      Palpations: Abdomen is soft.   Musculoskeletal:         General: Normal range of motion.      Cervical back: Normal range of motion and neck supple.   Skin:     General: Skin is warm and dry.   Neurological:      Mental Status: She is alert and oriented to person, place, and time.   Psychiatric:         Behavior: Behavior normal.         Thought Content: Thought content normal.         Judgment: Judgment normal.         Procedures           ED Course  ED Course as of 01/05/24 1630   Fri Jan 05, 2024   1032 Spoke with stroke navigator.  Considering her symptoms are currently resolved regarding her left-sided facial numbness since yesterday we will get a plain MRI.  Will also get troponins EKGs. [JM]   1519 Awaiting 2nd [JM]   1627 Stroke navigator recommends daily aspirin.  Think that is reasonable.  She is symptom-free.  Have very long conversation with the patient and her family at the bedside about the signs symptoms worse condition and appropriate follow-up.  I will also give her neurology and refer her to her cardiologist as well.  All thankful and agreeable.  Well aware the signs of worse condition. [JM]      ED Course User Index  [JM] Karl New APRN                HEART Score: 3                    MRI Brain Without Contrast   Final Result   Impression:   Mild typical age-related changes are present. There is no evidence of acute ischemia, hemorrhage, mass or mass effect.            Electronically Signed: Richard Johnson MD     1/5/2024 1:56 PM EST     Workstation ID: [id number]      XR Chest 1 View   Final Result   Impression:   No  active disease.         Electronically Signed: Adolfo Oswald MD     1/5/2024 11:17 AM EST     Workstation ID: [id number]                  Medical Decision Making  Amount and/or Complexity of Data Reviewed  Labs: ordered.  Radiology: ordered.  ECG/medicine tests: ordered.    Risk  OTC drugs.  Prescription drug management.        Final diagnoses:   Acute chest pain   Paresthesia       ED Disposition  ED Disposition       ED Disposition   Discharge    Condition   Stable    Comment   --               [facility] CARDIOLOGY  [address]  [place] [id number] [phone]  Schedule an appointment as soon as possible for a visit       Tai Montelongo,   [address]   [place] [address] [phone]    Schedule an appointment as soon as possible for a visit       Alma Pantoja MD  [address]  [place] [address] [phone]    Schedule an appointment as soon as possible for a visit            Medication List        New Prescriptions      aspirin 81 MG EC tablet  Take 1 tablet by mouth Daily.               Where to Get Your Medications        Information about where to get these medications is not yet available    Ask your nurse or doctor about these medications  aspirin 81 MG EC tablet            Karl New, OSMAN  01/05/24 [id number]

## 2024-01-08 ENCOUNTER — HOSPITAL ENCOUNTER (OUTPATIENT)
Dept: CARDIOLOGY | Facility: HOSPITAL | Age: 79
Discharge: HOME | End: 2024-01-08
Payer: MEDICARE

## 2024-01-08 ENCOUNTER — OFFICE VISIT (OUTPATIENT)
Dept: CARDIOLOGY | Facility: HOSPITAL | Age: 79
End: 2024-01-08
Payer: MEDICARE

## 2024-01-08 VITALS
DIASTOLIC BLOOD PRESSURE: 58 MMHG | RESPIRATION RATE: 18 BRPM | WEIGHT: 103 LBS | TEMPERATURE: 96.3 F | SYSTOLIC BLOOD PRESSURE: 127 MMHG | BODY MASS INDEX: 20.22 KG/M2 | HEART RATE: 81 BPM | OXYGEN SATURATION: 100 % | HEIGHT: 60 IN

## 2024-01-08 VITALS — BODY MASS INDEX: 19.91 KG/M2 | HEIGHT: 60 IN | WEIGHT: 101.41 LBS

## 2024-01-08 DIAGNOSIS — R06.02 SHORTNESS OF BREATH: ICD-10-CM

## 2024-01-08 DIAGNOSIS — R00.2 PALPITATIONS: ICD-10-CM

## 2024-01-08 DIAGNOSIS — R42 LIGHTHEADEDNESS: ICD-10-CM

## 2024-01-08 DIAGNOSIS — I10 ESSENTIAL HYPERTENSION: ICD-10-CM

## 2024-01-08 DIAGNOSIS — R20.0 FACIAL NUMBNESS: Primary | ICD-10-CM

## 2024-01-08 DIAGNOSIS — R20.0 FACIAL NUMBNESS: ICD-10-CM

## 2024-01-08 DIAGNOSIS — R07.89 CHEST PAIN, ATYPICAL: ICD-10-CM

## 2024-01-08 PROCEDURE — 1159F MED LIST DOCD IN RCRD: CPT | Performed by: NURSE PRACTITIONER

## 2024-01-08 PROCEDURE — 1160F RVW MEDS BY RX/DR IN RCRD: CPT | Performed by: NURSE PRACTITIONER

## 2024-01-08 PROCEDURE — 3074F SYST BP LT 130 MM HG: CPT | Performed by: NURSE PRACTITIONER

## 2024-01-08 PROCEDURE — 93880 EXTRACRANIAL BILAT STUDY: CPT

## 2024-01-08 PROCEDURE — 3078F DIAST BP <80 MM HG: CPT | Performed by: NURSE PRACTITIONER

## 2024-01-08 PROCEDURE — 99214 OFFICE O/P EST MOD 30 MIN: CPT | Performed by: NURSE PRACTITIONER

## 2024-01-08 PROCEDURE — 93306 TTE W/DOPPLER COMPLETE: CPT

## 2024-01-08 NOTE — PROGRESS NOTES
[facility] Heart Monitor Documentation    Magy Ellis  1945  [id number]  01/08/24      [] ZIO XT Patch  Model [id number] Prescribed for N/A Days    Serial Number: (N + 9 Digits) N   Apply-By Date on Box:   USPS Tracking Number:   USPS Tracking        [] [employer] MINI PLUS Mobile Cardiac Telemetry  Model BGMINIPLUS Prescribed for 30 Days    Serial Number: (BGM + 7 Digits) [id number]  Shipped-By Date on Box: 323968  UPS Tracking Number: [id number]  UPS Tracking      [] [employer] MINI Holter Monitor  Model BGMINIEL Prescribed for N/A Days    Serial Number: (7 Digits)   Shipped-By Date on Box:   UPS Tracking Number: [id number]  UPS Tracking        This monitor was applied to the patient's chest and checked for proper functioning.  Ms. Magy Ellis was instructed in the proper use of this monitor.  She was given the opportunity to ask questions and left the office with the device 's instruction manual.    Nissa Danielson MA, 11:57 EST, 01/08/24                  [facility]

## 2024-01-08 NOTE — PROGRESS NOTES
"[facility]  [facility]    Chief Complaint  Chest Pain    Subjective    History of Present Illness {CC  Problem List  Visit  Diagnosis   Encounters  Notes  Medications  Labs  Result Review Imaging  Media :23}     Magy Ellis is a 78 y.o. female with hyperlipidemia, hypertension who presents today for evaluation of chest pain at the request of OSMAN Lucio.    Patient presents to the ED on 1/5/2024 with complaints of chest pain and difficulty breathing.  She also reports having some left-sided facial numbness the day before that lasted several hours but was resolved at the time of the ED visit.  High sensitive troponins and BNP is normal.  EKG showed occasional PVCs and PACs.  MRI of the brain negative for acute findings.  Stroke navigator recommended daily aspirin.     She is followed by Dr. Gomez for dizziness and palpitations.  She wore a Holter back in 2022 for 7 days which showed no significant arrhythmias, 3.6% PAC burden with short bursts of SVT noted.  She had a normal Lexiscan stress test in November 2022.    She reports that she primarily came into the ED because her BPs were running high in the 160s and had a strange sensation below her left eye. She says her neck \"feels weird\" and has pressure in her head. She has some intermittent dizziness/lightheadedness for the past couple of weeks. She notes intermittent heart pounding. She reports chest pain has since resolved and only happened on the day of the ED visit. She reports BPs have been more labile recently around 111-150s. She has never had BP issues in the past and is not on medications. She does have recent increased stress.She also reports that she was told she needed a crown on her left lower tooth.  She denies any exertional chest pain, jaw pain or shortness of breath.  She typically is quite active for her age.  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  She notes some intermittent " "dyspnea, primarily at rest.  Reports the need to take a deep breath at times     Cardiac risks: hyperlipidemia, HTN, age    Objective     Vital Signs:   Vitals:    01/08/24 1116 01/08/24 1117 01/08/24 1118   BP: 125/58 135/61 127/58   BP Location: Right arm Left arm Left arm   Patient Position: Sitting Standing Sitting   Cuff Size: Adult Adult Adult   Pulse: 77 85 81   Resp:   18   Temp: 96.3 °F (35.7 °C) 96.3 °F (35.7 °C) 96.3 °F (35.7 °C)   TempSrc:   Temporal   SpO2: 100% 99% 100%   Weight:   46.7 kg (103 lb)   Height:   152.4 cm (60\")     Body mass index is 20.12 kg/m².  Physical Exam  Vitals reviewed.   Constitutional:       Appearance: Normal appearance.   HENT:      Head: Normocephalic.   Neck:      Vascular: No carotid bruit.   Cardiovascular:      Rate and Rhythm: Normal rate and regular rhythm.      Pulses: Normal pulses.      Heart sounds: Normal heart sounds, S1 normal and S2 normal. No murmur heard.  Pulmonary:      Effort: Pulmonary effort is normal. No respiratory distress.      Breath sounds: Normal breath sounds.   Chest:      Chest wall: No tenderness.   Abdominal:      General: Abdomen is flat.      Palpations: Abdomen is soft.   Musculoskeletal:      Cervical back: Neck supple.      Right lower leg: No edema.      Left lower leg: No edema.   Skin:     General: Skin is warm and dry.   Neurological:      General: No focal deficit present.      Mental Status: She is alert and oriented to person, place, and time. Mental status is at baseline.   Psychiatric:         Mood and Affect: Mood normal.         Behavior: Behavior normal.         Thought Content: Thought content normal.              Result Review  Data Reviewed:{ Labs  Result Review  Imaging  Med Tab  Media :23}   High Sensitivity Troponin T 2Hr (01/05/2024 14:56)  Urinalysis With Microscopic If Indicated (No Culture) - Urine, Clean Catch (01/05/2024 10:57)  BNP (01/05/2024 10:43)  Lipase (01/05/2024 10:43)  Comprehensive Metabolic Panel " (01/05/2024 10:43)  CBC & Differential (01/05/2024 10:43)  High Sensitivity Troponin T (01/05/2024 10:43)  ECG 12 Lead ED Triage Standing Order; Chest Pain (01/05/2024 10:18)  ECG 12 Lead ED Triage Standing Order; Chest Pain (01/05/2024 12:49)  SCANNED - STRESS TEST (11/30/2022)         Assessment and Plan {CC Problem List  Visit Diagnosis  ROS  Review (Popup)  [facility] Maintenance  Quality  BestPractice  Medications  SmartSets  SnapShot Encounters  Media :23}   1. Facial numbness  - Left sided. Normal MRI of the brain.  Will do echo with bubble study, carotid artery duplex and 30-day event monitor.  If these tests are unremarkable, would consider follow-up with dentist  - Duplex Carotid Ultrasound CAR; Future  - Adult Transthoracic Echo Complete W/ Cont if Necessary Per Protocol; Future    2. Lightheadedness    - Duplex Carotid Ultrasound CAR; Future  - Mobile Cardiac Outpatient Telemetry; Future  - Adult Transthoracic Echo Complete W/ Cont if Necessary Per Protocol; Future    3. Palpitations    - Mobile Cardiac Outpatient Telemetry; Future  - Adult Transthoracic Echo Complete W/ Cont if Necessary Per Protocol; Future    4. Essential hypertension  -She has never been on medications in the past.  Overall, most readings appear to be acceptable and due to high risk for falls will defer medication at this time.  I did advise her to bring her blood pressure cuff to her appointment next week with her PCP and make sure that it is accurate  - Adult Transthoracic Echo Complete W/ Cont if Necessary Per Protocol; Future    5. Shortness of breath  - Recheck echo    6. Chest pain, atypical  - HS troponins normal. No further symptoms. Normal lexiscan stress test 11/2022 per notes but unable to find full report, will request  - Will defer ischemic work up at this time since asymptomatic. Advised to call with any recurrent symptoms        Follow Up {Instructions Charge Capture  Follow-up Communications :23}   Return  in about 6 weeks (around 2/19/2024) for Monitor results, Video Visit.    Patient was given instructions and counseling regarding her condition or for health maintenance advice. Please see specific information pulled into the AVS if appropriate.  Advised to call the [facility] with any questions, concerns, or worsening symptoms.

## 2024-01-09 ENCOUNTER — TELEPHONE (OUTPATIENT)
Dept: CARDIOLOGY | Facility: HOSPITAL | Age: 79
End: 2024-01-09
Payer: MEDICARE

## 2024-01-09 DIAGNOSIS — R42 LIGHTHEADEDNESS: ICD-10-CM

## 2024-01-09 DIAGNOSIS — R20.0 FACIAL NUMBNESS: Primary | ICD-10-CM

## 2024-01-09 DIAGNOSIS — I65.02 OCCLUSION OF LEFT VERTEBRAL ARTERY: ICD-10-CM

## 2024-01-09 LAB
BH CV XLRA MEAS LEFT DIST CCA EDV: 22.5 CM/SEC
BH CV XLRA MEAS LEFT DIST CCA PSV: 88.4 CM/SEC
BH CV XLRA MEAS LEFT DIST ICA EDV: -39 CM/SEC
BH CV XLRA MEAS LEFT DIST ICA PSV: -131 CM/SEC
BH CV XLRA MEAS LEFT ICA/CCA RATIO: 1.25
BH CV XLRA MEAS LEFT MID CCA EDV: 21.7 CM/SEC
BH CV XLRA MEAS LEFT MID CCA PSV: 85.8 CM/SEC
BH CV XLRA MEAS LEFT MID ICA EDV: -35.5 CM/SEC
BH CV XLRA MEAS LEFT MID ICA PSV: -106 CM/SEC
BH CV XLRA MEAS LEFT PROX CCA EDV: 18.2 CM/SEC
BH CV XLRA MEAS LEFT PROX CCA PSV: 84.9 CM/SEC
BH CV XLRA MEAS LEFT PROX ECA EDV: -7.8 CM/SEC
BH CV XLRA MEAS LEFT PROX ECA PSV: -66.7 CM/SEC
BH CV XLRA MEAS LEFT PROX ICA EDV: -19.1 CM/SEC
BH CV XLRA MEAS LEFT PROX ICA PSV: -64.1 CM/SEC
BH CV XLRA MEAS LEFT PROX SCLA PSV: 141 CM/SEC
BH CV XLRA MEAS LEFT VERTEBRAL A EDV: 13 CM/SEC
BH CV XLRA MEAS LEFT VERTEBRAL A PSV: 47.7 CM/SEC
BH CV XLRA MEAS RIGHT DIST CCA EDV: 19.9 CM/SEC
BH CV XLRA MEAS RIGHT DIST CCA PSV: 67.7 CM/SEC
BH CV XLRA MEAS RIGHT DIST ICA EDV: -43.3 CM/SEC
BH CV XLRA MEAS RIGHT DIST ICA PSV: -137 CM/SEC
BH CV XLRA MEAS RIGHT ICA/CCA RATIO: 1.61
BH CV XLRA MEAS RIGHT MID CCA EDV: 21.7 CM/SEC
BH CV XLRA MEAS RIGHT MID CCA PSV: 83.9 CM/SEC
BH CV XLRA MEAS RIGHT MID ICA EDV: 33.8 CM/SEC
BH CV XLRA MEAS RIGHT MID ICA PSV: 134 CM/SEC
BH CV XLRA MEAS RIGHT PROX CCA EDV: 16.8 CM/SEC
BH CV XLRA MEAS RIGHT PROX CCA PSV: 89.5 CM/SEC
BH CV XLRA MEAS RIGHT PROX ECA EDV: -8.7 CM/SEC
BH CV XLRA MEAS RIGHT PROX ECA PSV: -60.7 CM/SEC
BH CV XLRA MEAS RIGHT PROX ICA EDV: -24.9 CM/SEC
BH CV XLRA MEAS RIGHT PROX ICA PSV: -80.1 CM/SEC
BH CV XLRA MEAS RIGHT PROX SCLA PSV: 77 CM/SEC
BH CV XLRA MEAS RIGHT VERTEBRAL A EDV: 13.9 CM/SEC
BH CV XLRA MEAS RIGHT VERTEBRAL A PSV: 52.9 CM/SEC
RIGHT ARM BP: NORMAL MMHG

## 2024-01-09 NOTE — TELEPHONE ENCOUNTER
Called patient with echo and carotid duplex results.Echo appeared stable, however, bubble study was not done.  Will reach out to cardiovascular lab to see if this can be rescheduled.  Carotid artery duplex showed no significant carotid artery stenosis.  Left vertebral occluded vessel noted. Recommend that she follow up with neurology due multiple neurological symptoms

## 2024-01-10 LAB
QT INTERVAL: 344 MS
QT INTERVAL: 368 MS
QTC INTERVAL: 404 MS
QTC INTERVAL: 427 MS

## 2024-01-15 LAB
ASCENDING AORTA: 3.1 CM
BH CV ECHO MEAS - AO MAX PG: 9.7 MMHG
BH CV ECHO MEAS - AO MEAN PG: 5 MMHG
BH CV ECHO MEAS - AO ROOT DIAM: 3.3 CM
BH CV ECHO MEAS - AO V2 MAX: 156 CM/SEC
BH CV ECHO MEAS - AO V2 VTI: 36.1 CM
BH CV ECHO MEAS - AVA(I,D): 3 CM2
BH CV ECHO MEAS - EDV(CUBED): 32.8 ML
BH CV ECHO MEAS - EDV(MOD-SP2): 27.7 ML
BH CV ECHO MEAS - EDV(MOD-SP4): 28.8 ML
BH CV ECHO MEAS - EF(MOD-BP): 69.1 %
BH CV ECHO MEAS - EF(MOD-SP2): 68 %
BH CV ECHO MEAS - EF(MOD-SP4): 69.5 %
BH CV ECHO MEAS - ESV(CUBED): 10.6 ML
BH CV ECHO MEAS - ESV(MOD-SP2): 8.9 ML
BH CV ECHO MEAS - ESV(MOD-SP4): 8.8 ML
BH CV ECHO MEAS - FS: 31.3 %
BH CV ECHO MEAS - IVS/LVPW: 1 CM
BH CV ECHO MEAS - IVSD: 0.9 CM
BH CV ECHO MEAS - LA DIMENSION: 3 CM
BH CV ECHO MEAS - LAT PEAK E' VEL: 9 CM/SEC
BH CV ECHO MEAS - LV DIASTOLIC VOL/BSA (35-75): 20.9 CM2
BH CV ECHO MEAS - LV MASS(C)D: 77.3 GRAMS
BH CV ECHO MEAS - LV MAX PG: 8.5 MMHG
BH CV ECHO MEAS - LV MEAN PG: 5 MMHG
BH CV ECHO MEAS - LV SYSTOLIC VOL/BSA (12-30): 6.4 CM2
BH CV ECHO MEAS - LV V1 MAX: 146 CM/SEC
BH CV ECHO MEAS - LV V1 VTI: 34 CM
BH CV ECHO MEAS - LVIDD: 3.2 CM
BH CV ECHO MEAS - LVIDS: 2.2 CM
BH CV ECHO MEAS - LVOT AREA: 3.1 CM2
BH CV ECHO MEAS - LVOT DIAM: 2 CM
BH CV ECHO MEAS - LVPWD: 0.9 CM
BH CV ECHO MEAS - MED PEAK E' VEL: 8.1 CM/SEC
BH CV ECHO MEAS - MV A MAX VEL: 87.5 CM/SEC
BH CV ECHO MEAS - MV DEC SLOPE: 474 CM/SEC2
BH CV ECHO MEAS - MV DEC TIME: 0.22 SEC
BH CV ECHO MEAS - MV E MAX VEL: 84.9 CM/SEC
BH CV ECHO MEAS - MV E/A: 0.97
BH CV ECHO MEAS - MV MAX PG: 6.9 MMHG
BH CV ECHO MEAS - MV MEAN PG: 2 MMHG
BH CV ECHO MEAS - MV P1/2T: 75.4 MSEC
BH CV ECHO MEAS - MV V2 VTI: 30.1 CM
BH CV ECHO MEAS - MVA(P1/2T): 2.9 CM2
BH CV ECHO MEAS - MVA(VTI): 3.5 CM2
BH CV ECHO MEAS - PA ACC TIME: 0.13 SEC
BH CV ECHO MEAS - SI(MOD-SP2): 13.7 ML/M2
BH CV ECHO MEAS - SI(MOD-SP4): 14.5 ML/M2
BH CV ECHO MEAS - SV(LVOT): 106.8 ML
BH CV ECHO MEAS - SV(MOD-SP2): 18.8 ML
BH CV ECHO MEAS - SV(MOD-SP4): 20 ML
BH CV ECHO MEAS - TAPSE (>1.6): 2.41 CM
BH CV ECHO MEAS - TR MAX PG: 18.9 MMHG
BH CV ECHO MEAS - TR MAX VEL: 217 CM/SEC
BH CV ECHO MEASUREMENTS AVERAGE E/E' RATIO: 9.93
BH CV ECHO SHUNT ASSESSMENT PERFORMED (HIDDEN SCRIPTING): 1
BH CV VAS BP RIGHT ARM: NORMAL MMHG
BH CV XLRA - RV BASE: 2.4 CM
BH CV XLRA - RV MID: 1.8 CM
BH CV XLRA - TDI S': 13.2 CM/SEC
IVRT: 102 MS
LEFT ATRIUM VOLUME INDEX: 20.9 ML/M2
LV EF 2D ECHO EST: 60 %

## 2024-01-22 ENCOUNTER — OFFICE VISIT (OUTPATIENT)
Dept: NEUROLOGY | Facility: CLINIC | Age: 79
End: 2024-01-22
Payer: MEDICARE

## 2024-01-22 VITALS
SYSTOLIC BLOOD PRESSURE: 124 MMHG | HEIGHT: 60 IN | WEIGHT: 105 LBS | OXYGEN SATURATION: 100 % | DIASTOLIC BLOOD PRESSURE: 64 MMHG | HEART RATE: 62 BPM | BODY MASS INDEX: 20.62 KG/M2 | TEMPERATURE: 97.6 F

## 2024-01-22 DIAGNOSIS — R79.89 OTHER SPECIFIED ABNORMAL FINDINGS OF BLOOD CHEMISTRY: ICD-10-CM

## 2024-01-22 DIAGNOSIS — Z86.73 HISTORY OF TIA (TRANSIENT ISCHEMIC ATTACK): Primary | ICD-10-CM

## 2024-01-22 PROCEDURE — 3078F DIAST BP <80 MM HG: CPT | Performed by: NURSE PRACTITIONER

## 2024-01-22 PROCEDURE — 99214 OFFICE O/P EST MOD 30 MIN: CPT | Performed by: NURSE PRACTITIONER

## 2024-01-22 PROCEDURE — 3074F SYST BP LT 130 MM HG: CPT | Performed by: NURSE PRACTITIONER

## 2024-01-22 RX ORDER — HYDROXYZINE HYDROCHLORIDE 25 MG/1
TABLET, FILM COATED ORAL
COMMUNITY
Start: 2023-12-07

## 2024-01-22 NOTE — PATIENT INSTRUCTIONS
-Continue ASA 81 mg   -complete 30 day holter monitor and send back for interpretation   -complete CTA head and neck   -check lipid panel and HgbA1C  -will decide on if you need cholesterol medication based on results of lipid panel

## 2024-01-22 NOTE — PROGRESS NOTES
New Patient Office Visit      Encounter Date: 2024   Patient Name: Magy Ellis  : 1945   MRN: [id number]   PCP: Tai Montelongo DO    Referring Provider: RAZ Bird*     Chief Complaint:    Chief Complaint   Patient presents with    Establish Care       History of Present Illness: Magy Ellis is a 78 y.o. female who is here today to establish care.  Patient has prior medical history significant for GERD, arthritis, heart murmur and hypertension.  She presented to [facility] ED on 2024 with complaints of chest pain, shortness of air, mild to moderate headache and left facial numbness.  She reports that the symptoms did resolve fairly quickly.  ED provider completed an MRI of the brain which was negative for acute intracranial abnormality.  An echocardiogram was completed which revealed an EF of 60%, negative for PFO and no LAE.  Her EKG while in the ED was negative for atrial fibrillation but did show the occasional PVC, PAC.  She is followed by Dr. Gomez with [facility] cardiology for dizziness and palpitations.  Of note in the past patient completed a 7-day Holter monitor in  that was negative for atrial fibrillation but did show a burst of SVT.  She also had a Flory scan in  that was normal.  She had a follow-up with cardiology after her discharge from the ED and a carotid ultrasound was ordered which showed minimal carotid athero and bilateral stenosis of less than 50%.  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    Today patient presents for her stroke clinic follow-up.  She reports that she is doing well but has some occasional dizziness.  She also reports the periodic headache.  She reports that she has a very mild headache today but does not get headaches frequently.  She does not carry a diagnosis of migraine.  She is currently wearing a 30-day cardiac monitor that she reports she will return on .  We discussed  that her symptoms could represent a TIA or also could represent a complex migraine given her complaint of headache during this episode but I feel this probably is less likely.  We will have to gather some more information and complete her stroke workup prior to being able to make a formal plan.  The stroke navigator on-call was contacted on 1/5/2024 prior to her discharge and she was recommended to take ASA 81 mg daily which we will continue for now.  Based on the results of her CTA head and neck and lipid panel we may need to increase her aspirin or add Plavix as well as consider statin therapy.    Stroke Risk Factors: hypertension      Subjective      Review of Systems:   Review of Systems   Constitutional:  Negative for activity change, appetite change, chills, diaphoresis, fatigue, fever, unexpected weight gain and unexpected weight loss.   Eyes:  Negative for photophobia and visual disturbance.   Respiratory:  Negative for cough and shortness of breath.    Cardiovascular:  Positive for palpitations. Negative for chest pain and leg swelling.   Gastrointestinal:  Negative for nausea and vomiting.   Musculoskeletal:  Negative for gait problem.   Neurological:  Positive for dizziness, numbness and headache. Negative for tremors, seizures, syncope, speech difficulty, weakness, light-headedness and memory problem.       Past Medical History:   Past Medical History:   Diagnosis Date    Acid reflux     Arthritis     Headache, tension-type     Heart murmur     Osteoporosis     Ulcer        Past Surgical History:   Past Surgical History:   Procedure Laterality Date    COLONOSCOPY      EXPLORATORY LAPAROTOMY N/A 06/26/2017    Procedure: LAPAROTOMY EXPLORATORY WITH LYSIS OF ADHESIONS; REDUCTION OF HERNIA  ;  Surgeon: Sami Griffiths MD;  Location: [facility];  Service:     HIP TROCHANTERIC NAILING WITH INTRAMEDULLARY HIP SCREW Right 04/12/2020    Procedure: HIP TROCHANTERIC NAILING WITH INTRAMEDULLARY HIP SCREW;  Surgeon:  Kash Akins Jr., MD;  Location: [facility];  Service: Orthopedics;  Laterality: Right;    TUBAL ABDOMINAL LIGATION         Family History:   Family History   Problem Relation Age of Onset    Asthma Mother     Throat cancer Father     Heart attack Father     No Known Problems Sister     No Known Problems Sister     No Known Problems Brother     No Known Problems Daughter     No Known Problems Son     No Known Problems Son        Social History:   Social History     Socioeconomic History    Marital status:    Tobacco Use    Smoking status: Never     Passive exposure: Never    Smokeless tobacco: Never   Vaping Use    Vaping Use: Never used   Substance and Sexual Activity    Alcohol use: No    Drug use: No    Sexual activity: Not Currently       Medications:     Current Outpatient Medications:     Ascorbic Acid (Vitamin C) 500 MG chewable tablet, Chew 500 mg Daily., Disp: , Rfl:     aspirin 81 MG EC tablet, Take 1 tablet by mouth Daily., Disp: 30 tablet, Rfl: 1    Black Elderberry (Sambucol Black Elderberry) 1.9 GM/5ML syrup, Take 5 mL by mouth Every Night., Disp: , Rfl:     fluticasone (FLONASE) 50 MCG/ACT nasal spray, 1 spray into the nostril(s) as directed by provider As Needed., Disp: , Rfl:     hydrOXYzine (ATARAX) 25 MG tablet, 1/2 to one tab tid prn anxiety, Disp: , Rfl:     lansoprazole (PREVACID) 30 MG capsule, Take 1 capsule by mouth Daily., Disp: , Rfl:     Multiple Vitamins-Minerals (MULTIVITAMIN PO), Take 1 tablet by mouth Daily., Disp: , Rfl:     Probiotic Product (Probiotic-10) chewable tablet, Chew 1 tablet Daily., Disp: , Rfl:     risedronate (ACTONEL) 35 MG tablet, Take 1 tablet by mouth Every 7 (Seven) Days. with water on empty stomach, nothing by mouth or lie down for next 30 minutes. On Fridays., Disp: , Rfl:     vitamin B-12 (CYANOCOBALAMIN) 1000 MCG tablet, Take 1 tablet by mouth Daily., Disp: , Rfl:     Vitamin D, Cholecalciferol, 50 MCG (2000 UT) capsule, Take 1 capsule by mouth  "Daily., Disp: , Rfl:     Allergies:   Allergies   Allergen Reactions    Sulfa Antibiotics Hives       Objective     Physical Exam:  Vital Signs:   Vitals:    01/22/24 1300   BP: 124/64   Pulse: 62   Temp: 97.6 °F (36.4 °C)   SpO2: 100%   Weight: 47.6 kg (105 lb)   Height: 152.4 cm (60\")     Body mass index is 20.51 kg/m².     Physical Exam  Vitals and nursing note reviewed.   Constitutional:       General: She is not in acute distress.     Appearance: Normal appearance. She is normal weight. She is not ill-appearing.   HENT:      Head: Normocephalic and atraumatic.      Nose: Nose normal.      Mouth/Throat:      Mouth: Mucous membranes are moist.   Eyes:      Extraocular Movements: Extraocular movements intact.      Pupils: Pupils are equal, round, and reactive to light.   Cardiovascular:      Rate and Rhythm: Normal rate and regular rhythm.      Pulses: Normal pulses.   Pulmonary:      Effort: Pulmonary effort is normal. No respiratory distress.   Skin:     General: Skin is warm and dry.   Neurological:      General: No focal deficit present.      Mental Status: She is alert and oriented to person, place, and time.   Psychiatric:         Mood and Affect: Mood normal.         Behavior: Behavior normal.       NIH 0     Modified Loving Score: 0        0  No Symptoms    1 No significant disability. Able to carry out all usual activities, despite some symptoms.    2 Slight disability. Able to look after own affairs without assistance, but unable to carry out all previous activities.    3 Moderate disability. Requires some help, but able to walk unassisted.    4 Moderately severe disability. Unable to attend to own bodily needs without assistance, and unable to walk unassisted.    5 Severe disability. Requires constant nursing care and attention, bedridden, incontinent.    6 Dead       PHQ-9 Depression Screening  Little interest or pleasure in doing things? 0-->not at all   Feeling down, depressed, or hopeless? 0-->not " "at all   Trouble falling or staying asleep, or sleeping too much?     Feeling tired or having little energy?     Poor appetite or overeating?     Feeling bad about yourself - or that you are a failure or have let yourself or your family down?     Trouble concentrating on things, such as reading the newspaper or watching television?     Moving or speaking so slowly that other people could have noticed? Or the opposite - being so fidgety or restless that you have been moving around a lot more than usual?     Thoughts that you would be better off dead, or of hurting yourself in some way?     PHQ-9 Total Score 0   If you checked off any problems, how difficult have these problems made it for you to do your work, take care of things at home, or get along with other people?         STOP-Bang Score  Have you been diagnosed with Sleep Apnea?: no  Snoring?: yes  Tired?: no  Observed?: no  Pressure?: no  Stop Score: 1  Body Mass Index more than 35 kg/m2?: no  Age older than 50 year old?: yes  Neck large? \">17\"/43cm-M, >16\"/41cm-F: no  Gender=Male?: no  Total Stop-Bang Score: 2       STEADI Fall Risk Clinician Key Questions   Have you fallen in the past year?: No  Do you feel unsteady with walking?: Yes (sometimes)  Are you worried about falling?: Yes      Imaging Reviewed:   Adult Transthoracic Echo Complete W/ Cont if Necessary Per Protocol    Addendum Date: 1/15/2024      Left ventricular systolic function is normal. Estimated left ventricular EF = 60%   Left ventricular diastolic function was normal.   Saline test results are negative for intracardiac shunting.   Trace to mild mitral regurgitation.   Mild tricuspid regurgitation.     MRI Brain Without Contrast    Result Date: 1/5/2024  Impression: Mild typical age-related changes are present. There is no evidence of acute ischemia, hemorrhage, mass or mass effect. Electronically Signed: Richard Johnson MD  1/5/2024 1:56 PM EST  Workstation ID: [id number]    XR Chest 1 " View    Result Date: 1/5/2024  Impression: No active disease. Electronically Signed: Adolfo Oswald MD  1/5/2024 11:17 AM EST  Workstation ID: [id number]      Laboratory Results:   H&H-13.9/42.1  Plts-358  AST/ALT-16/12   LDL-pending    [id number]     Assessment / Plan      Assessment/Plan:   Suspected TIA  -continue ASA 81 mg daily   -currently wearing 30 day cardiac monitor   -CTA head and neck ordered to evaluate vasculature   -check lipid panel and A1C, instructed patient these need to be fasted  -will make further recommendations on medication and secondary prevention based on results of above  -reviewed s/sx of stroke and when to call 911  -follow up in stroke clinic in 3 months  -will call with results     Discussed the importance of medication compliance and lifestyle modifications (adequate blood pressure control, adequate control of hyperlipidemia, adequate glycemic control, increase physical activity, and healthy diet) to help reduce the risk of future cerebrovascular events.  Also discussed the signs symptoms that would warrant the patient return back to the emergency department including unilateral weakness, unilateral numbness, visual disturbances, loss of balance, speech difficulties, and/or a sudden severe headache.      Follow Up:   Return in about 3 months (around 4/22/2024).    OSMAN Hinton  [facility]roke

## 2024-01-23 ENCOUNTER — LAB (OUTPATIENT)
Dept: LAB | Facility: HOSPITAL | Age: 79
End: 2024-01-23
Payer: MEDICARE

## 2024-01-23 DIAGNOSIS — R79.89 OTHER SPECIFIED ABNORMAL FINDINGS OF BLOOD CHEMISTRY: ICD-10-CM

## 2024-01-23 DIAGNOSIS — Z86.73 HISTORY OF TIA (TRANSIENT ISCHEMIC ATTACK): ICD-10-CM

## 2024-01-23 LAB
CHOLEST SERPL-MCNC: 236 MG/DL (ref 0–200)
HBA1C MFR BLD: 5.5 % (ref 4.8–5.6)
HDLC SERPL-MCNC: 81 MG/DL (ref 40–60)
LDLC SERPL CALC-MCNC: 137 MG/DL (ref 0–100)
LDLC/HDLC SERPL: 1.66 {RATIO}
TRIGL SERPL-MCNC: 104 MG/DL (ref 0–150)
VLDLC SERPL-MCNC: 18 MG/DL (ref 5–40)

## 2024-01-23 PROCEDURE — 36415 COLL VENOUS BLD VENIPUNCTURE: CPT

## 2024-01-23 PROCEDURE — 80061 LIPID PANEL: CPT

## 2024-01-23 PROCEDURE — 83036 HEMOGLOBIN GLYCOSYLATED A1C: CPT

## 2024-01-24 ENCOUNTER — TELEPHONE (OUTPATIENT)
Dept: NEUROLOGY | Facility: CLINIC | Age: 79
End: 2024-01-24
Payer: MEDICARE

## 2024-01-24 DIAGNOSIS — E78.5 HYPERLIPIDEMIA, UNSPECIFIED HYPERLIPIDEMIA TYPE: Primary | ICD-10-CM

## 2024-01-24 RX ORDER — ATORVASTATIN CALCIUM 40 MG/1
40 TABLET, FILM COATED ORAL NIGHTLY
Qty: 30 TABLET | Refills: 11 | Status: SHIPPED | OUTPATIENT
Start: 2024-01-24 | End: 2025-01-23

## 2024-01-24 NOTE — PROGRESS NOTES
Patient called our office to discuss the results of her lab work.  I reviewed with her her hemoglobin A1c and her lipid panel as below.  Lipid Panel          1/23/2024    09:02   Lipid Panel   Total Cholesterol 236    Triglycerides 104    HDL Cholesterol 81    VLDL Cholesterol 18    LDL Cholesterol  137    LDL/HDL Ratio 1.66       Most Recent A1C          1/23/2024    09:02   HGBA1C Most Recent   Hemoglobin A1C 5.50       I have recommended the patient to begin taking Lipitor 40 mg nightly for her cholesterol and secondary stroke prevention.  I encouraged her to continue to focus on a Mediterranean diet/heart healthy diet and making sure that she is drinking plenty of water.  She reports that since her episode at the beginning of the year she has had ongoing dizziness.  We did discuss this at her appointment the other day.  She reports that she may have been experiencing it for couple of weeks prior to her episode on 1/5.  I have instructed her for any worsening or new stroke symptoms to be seen in the ED. take your blood pressure daily to make sure that there are no large fluctuations.  Change positions slowly.  Ensure adequate oral hydration.  Please call with any additional questions or concerns.  Patient has been reminded to schedule CTA of the head and neck so that we can complete her TIA evaluation.

## 2024-01-24 NOTE — TELEPHONE ENCOUNTER
Caller: Magy Ellis    Relationship: Self    Best call back number: [phone]    Caller requesting test results: LABS    What test was performed: LAB     When was the test performed: 1-23-24    Where was the test performed: [facility]     Additional notes: PT IS CALLING FOR THE RESULTS OF THE LABS.   STATED SHE IS STILL FEELING LIGHT HEADED AND FLOATEY.   STATED IS WORRIED ABOUT HAVING A STROKE.     PLEASE CALL & ADVISE

## 2024-01-26 ENCOUNTER — TELEPHONE (OUTPATIENT)
Dept: CARDIOLOGY | Facility: HOSPITAL | Age: 79
End: 2024-01-26
Payer: MEDICARE

## 2024-01-26 NOTE — TELEPHONE ENCOUNTER
Called patient back; spoke with patient and answered her question she had left on my voicemail about whether or not she can wear her heart monitor during her CT scan. I advised she can indeed wear the heart monitor the only scan you can't wear the monitor is a MRI but even if the nurse or tech asks her to take the heart monitor off for her to get there scan done that's okay she can put a new one on once she gets back home. She stated she does have extra patches to do so if need be.

## 2024-01-31 ENCOUNTER — TELEPHONE (OUTPATIENT)
Dept: NEUROLOGY | Facility: CLINIC | Age: 79
End: 2024-01-31
Payer: MEDICARE

## 2024-01-31 ENCOUNTER — HOSPITAL ENCOUNTER (OUTPATIENT)
Dept: CT IMAGING | Facility: HOSPITAL | Age: 79
Discharge: HOME | End: 2024-01-31
Admitting: NURSE PRACTITIONER
Payer: MEDICARE

## 2024-01-31 DIAGNOSIS — Z86.73 HISTORY OF TIA (TRANSIENT ISCHEMIC ATTACK): ICD-10-CM

## 2024-01-31 PROCEDURE — 70496 CT ANGIOGRAPHY HEAD: CPT

## 2024-01-31 PROCEDURE — 70498 CT ANGIOGRAPHY NECK: CPT

## 2024-01-31 PROCEDURE — 25510000001 IOPAMIDOL PER 1 ML: Performed by: NURSE PRACTITIONER

## 2024-01-31 RX ADMIN — IOPAMIDOL 75 ML: 755 INJECTION, SOLUTION INTRAVENOUS at 11:36

## 2024-01-31 NOTE — TELEPHONE ENCOUNTER
Reviewed with patient the results of her CTA head and neck as below: I shared with her that her imaging of her vessels was unremarkable. Specifically, her vertebral arteries are patent. On prior carotid U/S there was mention or suggestion of left vertebral occlusion and that is not appreciated here.   CT Angiogram Head    Result Date: 1/31/2024  Impression: Unremarkable CTA of the head and neck. Electronically Signed: Perico Leija MD  1/31/2024 1:21 PM EST  Workstation ID: [id number]    CT Angiogram Neck    Result Date: 1/31/2024  Impression: Unremarkable CTA of the head and neck. Electronically Signed: Perico Leija MD  1/31/2024 1:21 PM EST  Workstation ID: [id number]      Continue taking ASA 81 mg and lipitor 40 mg nightly for secondary stroke prevention. She has no other questions or concerns at this time.

## 2024-02-20 NOTE — PROGRESS NOTES
"[facility]  Heart and Valve Center  Telemedicine Visit      Mode of Visit: Video  Location of patient: home  You have chosen to receive care through a telehealth visit.  Does the patient consent to use a video/audio connection for your medical care today? Yes  The visit included audio and video interaction. No technical issues occurred during this visit.     Chief Complaint  Follow-up, Dizziness, and Palpitations    History of Present Illness    Magy Ellis is a 78 y.o. female with hyperlipidemia, hypertension who presents today as a telemedicine follow-up for lightheadedness and palpitations.    Patient was in the ED on 1/5/2024 with left-sided facial numbness, elevated blood pressures as well as some chest tightness and difficulty breathing.  She reports chest pain resolved after ED visit.  She had a normal MRI of the brain.  Echo showed no significant structural abnormalities including no intracardiac shunting. Carotid duplex showed no significant internal carotid artery stenosis, there was a possible occluded left vertebral this was not appreciated on CTA of the neck that was done by neurology. MCOT x 30 days showed no significant arrhythmias. Triggered events were PACs and PVCs. There was a 6% PAC burden. She reports that she is feeling better than she has in awhile. Dizziness has improved. Denies palpitations. She notes SBPs are usually running 118. Had one in the 90s before she fell asleep. No further neurological symptoms      Objective     Vital Signs:   Vitals:    02/21/24 1233   BP: 108/52   Pulse: 80   Weight: 46.7 kg (103 lb)   Height: 152.4 cm (60\")     Body mass index is 20.12 kg/m².    Virtual Visit Physical Exam  Physical Exam  Constitutional:       Appearance: Normal appearance.   HENT:      Head: Normocephalic.   Pulmonary:      Effort: Pulmonary effort is normal. No respiratory distress.   Neurological:      Mental Status: She is alert and oriented to person, place, and " time.   Psychiatric:         Mood and Affect: Mood normal.         Behavior: Behavior normal.         Thought Content: Thought content normal.              Result Review  Data Reviewed:{ Labs  Result Review  Imaging  Med Tab  Media :23}   Adult Transthoracic Echo Complete W/ Cont if Necessary Per Protocol (01/08/2024 15:33)  Duplex Carotid Ultrasound CAR (01/08/2024 15:29)  CT Angiogram Head (01/31/2024 11:36)  CT Angiogram Neck (01/31/2024 11:36)  MRI Brain Without Contrast (01/05/2024 13:53)             Assessment and Plan {CC Problem List  Visit Diagnosis  ROS  Review (Popup)  Health Maintenance  Quality  BestPractice  Medications  SmartSets  SnapShot Encounters  Media :23}   1. Dizziness  - Symptoms have improved. No arrhythmias on MCOT. Echo WNL and bubble study negative. Neuro work up unremarkable  - Continue adequate hydration and healthy nutrition. Would avoid aggressive restriction of calories and sodium due to some low BPs    2. Palpitations  - Hx of symptomatic PAC/PVCs. PAC burden 6% and PVC burden <1%  - Symptoms currently stable    Keep follow up with Dr. Gomez        Follow Up {Instructions Charge Capture  Follow-up Communications :23}   Return if symptoms worsen or fail to improve.    Patient was given instructions and counseling regarding her condition or for health maintenance advice. Please see specific information pulled into the AVS if appropriate.  Advised to call the Heart and Valve Center with any questions, concerns, or worsening symptoms.    Dictated Utilizing Dragon Dictation

## 2024-02-21 ENCOUNTER — TELEMEDICINE (OUTPATIENT)
Dept: CARDIOLOGY | Facility: HOSPITAL | Age: 79
End: 2024-02-21
Payer: MEDICARE

## 2024-02-21 VITALS
SYSTOLIC BLOOD PRESSURE: 108 MMHG | DIASTOLIC BLOOD PRESSURE: 52 MMHG | BODY MASS INDEX: 20.22 KG/M2 | HEART RATE: 80 BPM | WEIGHT: 103 LBS | HEIGHT: 60 IN

## 2024-02-21 DIAGNOSIS — R00.2 PALPITATIONS: ICD-10-CM

## 2024-02-21 DIAGNOSIS — R42 DIZZINESS: Primary | ICD-10-CM

## 2024-03-14 ENCOUNTER — OFFICE VISIT (OUTPATIENT)
Dept: CARDIOLOGY | Facility: CLINIC | Age: 79
End: 2024-03-14
Payer: MEDICARE

## 2024-03-14 VITALS
DIASTOLIC BLOOD PRESSURE: 66 MMHG | HEIGHT: 60 IN | HEART RATE: 71 BPM | OXYGEN SATURATION: 100 % | WEIGHT: 103 LBS | SYSTOLIC BLOOD PRESSURE: 132 MMHG | BODY MASS INDEX: 20.22 KG/M2

## 2024-03-14 DIAGNOSIS — I49.1 PREMATURE ATRIAL CONTRACTIONS: Primary | ICD-10-CM

## 2024-03-14 DIAGNOSIS — R42 DIZZINESS: ICD-10-CM

## 2024-03-14 DIAGNOSIS — E78.5 DYSLIPIDEMIA: ICD-10-CM

## 2024-03-14 DIAGNOSIS — I10 ESSENTIAL HYPERTENSION: ICD-10-CM

## 2024-03-14 PROCEDURE — 1159F MED LIST DOCD IN RCRD: CPT | Performed by: INTERNAL MEDICINE

## 2024-03-14 PROCEDURE — 99214 OFFICE O/P EST MOD 30 MIN: CPT | Performed by: INTERNAL MEDICINE

## 2024-03-14 PROCEDURE — 93000 ELECTROCARDIOGRAM COMPLETE: CPT | Performed by: INTERNAL MEDICINE

## 2024-03-14 PROCEDURE — 3075F SYST BP GE 130 - 139MM HG: CPT | Performed by: INTERNAL MEDICINE

## 2024-03-14 PROCEDURE — 1160F RVW MEDS BY RX/DR IN RCRD: CPT | Performed by: INTERNAL MEDICINE

## 2024-03-14 PROCEDURE — 3078F DIAST BP <80 MM HG: CPT | Performed by: INTERNAL MEDICINE

## 2024-03-14 NOTE — PROGRESS NOTES
[facility]    Patient ID: Magy Ellis is a 78 y.o. female.  : 1945   Contact: [phone]    Encounter date: 2024    PCP: Tai Montelongo DO      Chief complaint:   Chief Complaint   Patient presents with    Palpitations       Problem List:  Dizziness  Echo 10/2021: normal EF, no significant valve abnormality  Carotid duplex 2021: Less than 50% bilateral carotid stenosis, antegrade flow no evidence of subclavian steal  7-day Holter 2022: Normal sinus rhythm at 70 bpm, 3.6% PAC burden, short burst of SVT, rare PVCs, less than 1%.  No atrial fibrillation, pauses or heart block  Normal Flory stress 2022  MCOT, 2024: SB/SR/ST. Frequent PAC's, brief SVT, Occ PVC's. Pt events mostly correlate with PAC's and once PVC's.  Left facial numbness 24:  Carotid duplex, 2024: Bilateral ICA <50% stenosis.  Echo, 2024: EF 60%. Trace to mild MR. Mild TR.  Hypertension  Dyslipidemia  History of diverticulosis    Allergies   Allergen Reactions    Sulfa Antibiotics Hives       Current Medications:    Current Outpatient Medications:     Ascorbic Acid (Vitamin C) 500 MG chewable tablet, Chew 500 mg Daily., Disp: , Rfl:     aspirin 81 MG EC tablet, Take 1 tablet by mouth Daily., Disp: 30 tablet, Rfl: 1    atorvastatin (Lipitor) 40 MG tablet, Take 1 tablet by mouth Every Night., Disp: 30 tablet, Rfl: 11    Black Elderberry (Sambucol Black Elderberry) 1.9 GM/5ML syrup, Take 5 mL by mouth Every Night., Disp: , Rfl:     fluticasone (FLONASE) 50 MCG/ACT nasal spray, 1 spray into the nostril(s) as directed by provider As Needed., Disp: , Rfl:     hydrOXYzine (ATARAX) 25 MG tablet, 1/2 to one tab tid prn anxiety, Disp: , Rfl:     lansoprazole (PREVACID) 30 MG capsule, Take 1 capsule by mouth Daily., Disp: , Rfl:     Multiple Vitamins-Minerals (MULTIVITAMIN PO), Take 1 tablet by mouth Daily., Disp: , Rfl:     Probiotic Product (Probiotic-10) chewable  "tablet, Chew 1 tablet Daily., Disp: , Rfl:     risedronate (ACTONEL) 35 MG tablet, Take 1 tablet by mouth Every 7 (Seven) Days. with water on empty stomach, nothing by mouth or lie down for next 30 minutes. On Fridays., Disp: , Rfl:     vitamin B-12 (CYANOCOBALAMIN) 1000 MCG tablet, Take 1 tablet by mouth Daily., Disp: , Rfl:     Vitamin D, Cholecalciferol, 50 MCG (2000 UT) capsule, Take 1 capsule by mouth Daily., Disp: , Rfl:     HPI    Magy Ellis is a 78 y.o. female who presents today for a follow up of PAC's and cardiac risk factors. Since last visit, patient has been doing well overall from a cardiovascular standpoint. She monitors her blood pressure regularly at home and states it is typically <125 mmHg systolic and was 117/65 mmHg this morning. Patient has been dizzy and attributes this to taking too many vitamins and plans to start only take a multivitamin. She stays busy and active by sewing and walking but does not have a regular exercise routine. Patient notes occasional chest discomfort with exertion that resolves with rest. She states her PCP has recently started her on atorvastatin due to elevated LDL. Patient denies chest pain, shortness of breath, orthopnea, palpitations, edema, dizziness, and syncope.     The following portions of the patient's history were reviewed and updated as appropriate: allergies, current medications and problem list.    Pertinent positives as listed in the HPI.  All other systems reviewed are negative.         Vitals:    03/14/24 1523   BP: 132/66   BP Location: Left arm   Patient Position: Sitting   Pulse: 71   SpO2: 100%   Weight: 46.7 kg (103 lb)   Height: 152.4 cm (60\")       Physical Exam:  General: Alert and oriented.  Neck: Jugular venous pressure is within normal limits. Carotids have normal upstrokes without bruits.   Cardiovascular: Heart has a nondisplaced focal PMI. Regular rate and rhythm. No murmur, gallop or rub.  Lungs: Clear, no rales or wheezes. Equal " expansion is noted.   Extremities: Show no edema.  Skin: Warm and dry.  Neurologic: Nonfocal.     Diagnostic Data (reviewed with patient):  Lab Results   Component Value Date    GLUCOSE 102 (H) 01/05/2024    BUN 12 01/05/2024    CREATININE 0.74 01/05/2024    BCR 16.2 01/05/2024     01/05/2024    K 4.2 01/05/2024     01/05/2024    CO2 29.0 01/05/2024    CALCIUM 10.3 01/05/2024    ALBUMIN 5.0 01/05/2024    ALKPHOS 91 01/05/2024    AST 16 01/05/2024    ALT 12 01/05/2024     Lab Results   Component Value Date    CHOL 236 (H) 01/23/2024    TRIG 104 01/23/2024    HDL 81 (H) 01/23/2024     (H) 01/23/2024      Lab Results   Component Value Date    WBC 4.97 01/05/2024    RBC 4.46 01/05/2024    HGB 13.9 01/05/2024    HCT 42.1 01/05/2024    MCV 94.4 01/05/2024     01/05/2024                 ECG 12 Lead    Date/Time: 3/14/2024 3:31 PM  Performed by: Staci Gomez MD    Authorized by: Staci Gomez MD  Comparison: compared with previous ECG from 1/5/2024  Comparison to previous ECG: Supraventricular complexes  Low voltage QRS  Rhythm: sinus rhythm  BPM: 71  Other findings: low voltage    Clinical impression: abnormal EKG  Comments: Supraventricular complexes            Assessment:    ICD-10-CM ICD-9-CM   1. Premature atrial contractions  I49.1 427.61   2. Dizziness  R42 780.4   3. Essential hypertension  I10 401.9   4. Dyslipidemia  E78.5 272.4         Plan:  Continue on aspirin 81 mg for antiplatelet therapy.   Continue on atorvastatin 40 mg daily for hyperlipidemia.   Continue all other current medications.  F/up in 12 months, sooner if needed.      Scribed for Staci Gomez MD by Marcelo Landon. 3/14/2024 15:34 EDT    I Staci Gomez MD personally performed the services described in this documentation as scribed by the above individual in my presence, and it is both accurate and complete.    Staci Gomez MD, FACC

## 2024-04-22 ENCOUNTER — OFFICE VISIT (OUTPATIENT)
Dept: NEUROLOGY | Facility: CLINIC | Age: 79
End: 2024-04-22
Payer: MEDICARE

## 2024-04-22 ENCOUNTER — LAB (OUTPATIENT)
Dept: LAB | Facility: HOSPITAL | Age: 79
End: 2024-04-22
Payer: MEDICARE

## 2024-04-22 VITALS
SYSTOLIC BLOOD PRESSURE: 120 MMHG | OXYGEN SATURATION: 98 % | WEIGHT: 103 LBS | DIASTOLIC BLOOD PRESSURE: 78 MMHG | BODY MASS INDEX: 20.22 KG/M2 | HEART RATE: 66 BPM | HEIGHT: 60 IN | TEMPERATURE: 98.7 F

## 2024-04-22 DIAGNOSIS — E78.5 HYPERLIPIDEMIA, UNSPECIFIED HYPERLIPIDEMIA TYPE: Primary | ICD-10-CM

## 2024-04-22 DIAGNOSIS — E78.5 HYPERLIPIDEMIA, UNSPECIFIED HYPERLIPIDEMIA TYPE: ICD-10-CM

## 2024-04-22 LAB
CHOLEST SERPL-MCNC: 147 MG/DL (ref 0–200)
HDLC SERPL-MCNC: 75 MG/DL (ref 40–60)
LDLC SERPL CALC-MCNC: 56 MG/DL (ref 0–100)
LDLC/HDLC SERPL: 0.74 {RATIO}
TRIGL SERPL-MCNC: 84 MG/DL (ref 0–150)
VLDLC SERPL-MCNC: 16 MG/DL (ref 5–40)

## 2024-04-22 PROCEDURE — 3074F SYST BP LT 130 MM HG: CPT | Performed by: NURSE PRACTITIONER

## 2024-04-22 PROCEDURE — 80061 LIPID PANEL: CPT

## 2024-04-22 PROCEDURE — 99214 OFFICE O/P EST MOD 30 MIN: CPT | Performed by: NURSE PRACTITIONER

## 2024-04-22 PROCEDURE — 36415 COLL VENOUS BLD VENIPUNCTURE: CPT

## 2024-04-22 PROCEDURE — 3078F DIAST BP <80 MM HG: CPT | Performed by: NURSE PRACTITIONER

## 2024-04-22 NOTE — PROGRESS NOTES
Follow Up Office Visit      Encounter Date: 2024   Patient Name: Magy Ellis  : 1945   MRN: [id number]   PCP: Tia Montelongo DO    Chief Complaint:    Chief Complaint   Patient presents with    Transient Ischemic Attack       History of Present Illness: Magy Ellis is a 78 y.o. female who is here today to establish care.  Patient has prior medical history significant for GERD, arthritis, heart murmur and hypertension.  She presented to [facility] ED on 2024 with complaints of chest pain, shortness of air, mild to moderate headache and left facial numbness.  She reports that the symptoms did resolve fairly quickly.  ED provider completed an MRI of the brain which was negative for acute intracranial abnormality.  An echocardiogram was completed which revealed an EF of 60%, negative for PFO and no LAE.  Her EKG while in the ED was negative for atrial fibrillation but did show the occasional PVC, PAC.  She is followed by Dr. Gomez with [facility] cardiology for dizziness and palpitations.  Of note in the past patient completed a 7-day Holter monitor in  that was negative for atrial fibrillation but did show a burst of SVT.  She also had a Flory scan in  that was normal.  She had a follow-up with cardiology after her discharge from the ED and a carotid ultrasound was ordered which showed minimal carotid athero and bilateral stenosis of less than 50%.  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     Clinic visit 2024: today patient presents for her stroke clinic follow-up.  She reports that she is doing well but has some occasional dizziness.  She also reports the periodic headache.  She reports that she has a very mild headache today but does not get headaches frequently.  She does not carry a diagnosis of migraine.  She is currently wearing a 30-day cardiac monitor that she reports she will return on .  We discussed that her  symptoms could represent a TIA or also could represent a complex migraine given her complaint of headache during this episode but I feel this probably is less likely.  We will have to gather some more information and complete her stroke workup prior to being able to make a formal plan.  The stroke navigator on-call was contacted on 1/5/2024 prior to her discharge and she was recommended to take ASA 81 mg daily which we will continue for now.  Based on the results of her CTA head and neck and lipid panel we may need to increase her aspirin or add Plavix as well as consider statin therapy.     Clinic visit 4/22/2024: Patient presents to clinic today for routine follow-up.  She reports that she is doing very well with the exception of having some new right thigh pain.  She reports that for the last 3 weeks that when she is walking up stairs and putting exclusively on her right leg she has had pain in her thigh.  She has had previous surgery on this leg due to breaking her hip.  Patient already has a follow-up with her orthopedic surgeon to have an x-ray to check on this.  She is also curious if her statin medication could be causing pain in the leg.  I explained that statins are known to cause some cramping but usually in bilateral legs not just unilateral leg.  We will recheck her lipid panel today to see what progress she has made while on Lipitor 40 mg.  If her lab work is within acceptable range we can consider changing her to Crestor to lower her dose.  She has had no new or worsening strokelike symptoms.  Since her last visit she completed her CTA of the head and neck which were unremarkable.    Subjective        I have reviewed and the following portions of the patient's history were updated as appropriate: past family history, past medical history, past social history, past surgical history and problem list.    Medications:     Current Outpatient Medications:     aspirin 81 MG EC tablet, Take 1 tablet by mouth  "Daily., Disp: 30 tablet, Rfl: 1    atorvastatin (Lipitor) 40 MG tablet, Take 1 tablet by mouth Every Night., Disp: 30 tablet, Rfl: 11    Black Elderberry (Sambucol Black Elderberry) 1.9 GM/5ML syrup, Take 5 mL by mouth Every Night., Disp: , Rfl:     fluticasone (FLONASE) 50 MCG/ACT nasal spray, 1 spray into the nostril(s) as directed by provider As Needed., Disp: , Rfl:     hydrOXYzine (ATARAX) 25 MG tablet, 1/2 to one tab tid prn anxiety, Disp: , Rfl:     lansoprazole (PREVACID) 30 MG capsule, Take 1 capsule by mouth Daily., Disp: , Rfl:     Multiple Vitamins-Minerals (MULTIVITAMIN PO), Take 1 tablet by mouth Daily., Disp: , Rfl:     Probiotic Product (Probiotic-10) chewable tablet, Chew 1 tablet Daily., Disp: , Rfl:     risedronate (ACTONEL) 35 MG tablet, Take 1 tablet by mouth Every 7 (Seven) Days. with water on empty stomach, nothing by mouth or lie down for next 30 minutes. On Fridays., Disp: , Rfl:     Ascorbic Acid (Vitamin C) 500 MG chewable tablet, Chew 500 mg Daily. (Patient not taking: Reported on 4/22/2024), Disp: , Rfl:     vitamin B-12 (CYANOCOBALAMIN) 1000 MCG tablet, Take 1 tablet by mouth Daily. (Patient not taking: Reported on 4/22/2024), Disp: , Rfl:     Vitamin D, Cholecalciferol, 50 MCG (2000 UT) capsule, Take 1 capsule by mouth Daily. (Patient not taking: Reported on 4/22/2024), Disp: , Rfl:     Allergies:   Allergies   Allergen Reactions    Sulfa Antibiotics Hives       Objective     Physical Exam:   Vital Signs:   Vitals:    04/22/24 1056   BP: 120/78   BP Location: Right arm   Patient Position: Sitting   Cuff Size: Adult   Pulse: 66   Temp: 98.7 °F (37.1 °C)   TempSrc: Temporal   SpO2: 98%   Weight: 46.7 kg (103 lb)  Comment: stated   Height: 152.4 cm (60\")     Body mass index is 20.12 kg/m².    Physical Exam  Vitals and nursing note reviewed.   Constitutional:       General: She is awake. She is not in acute distress.     Appearance: Normal appearance. She is normal weight. She is not " ill-appearing.      Comments: 79 year old  female    HENT:      Head: Normocephalic and atraumatic.      Nose: Nose normal.      Mouth/Throat:      Mouth: Mucous membranes are moist.   Eyes:      General: Lids are normal.      Extraocular Movements: Extraocular movements intact.      Pupils: Pupils are equal, round, and reactive to light.   Cardiovascular:      Rate and Rhythm: Normal rate and regular rhythm.      Pulses: Normal pulses.   Pulmonary:      Effort: Pulmonary effort is normal. No respiratory distress.   Skin:     General: Skin is warm and dry.   Neurological:      General: No focal deficit present.      Mental Status: She is alert and oriented to person, place, and time.      Motor: Motor strength is normal.     Coordination: Coordination is intact.   Psychiatric:         Mood and Affect: Mood normal.         Speech: Speech normal.         Behavior: Behavior normal.       Neurological Exam  Mental Status  Awake and alert. Oriented to person, place, time and situation. Oriented to person, place, and time. Speech is normal. Language is fluent with no aphasia. Attention and concentration are normal. Fund of knowledge is appropriate for level of education.    Cranial Nerves  CN II: Visual acuity is normal. Visual fields full to confrontation.  CN III, IV, VI: Extraocular movements intact bilaterally. Normal lids and orbits bilaterally. Pupils equal round and reactive to light bilaterally.  CN V: Facial sensation is normal.  CN VII: Full and symmetric facial movement.  CN VIII: Hearing is normal to speech .  CN XI: Shoulder shrug strength is normal.  CN XII: Tongue midline without atrophy or fasciculations.    Motor  Normal muscle bulk throughout. No fasciculations present. Normal muscle tone. Strength is 5/5 throughout all four extremities.    Sensory  Light touch is normal in upper and lower extremities. Pinprick is normal in upper and lower extremities.     Coordination    Finger-to-nose, rapid  alternating movements and heel-to-shin normal bilaterally without dysmetria.  No obvious dysmetria .    Gait  Casual gait is normal including stance, stride, and arm swing.       Modified [place] Score: 1, d/t right leg pain and limiting her going up the stairs        0  No Symptoms    1 No significant disability. Able to carry out all usual activities, despite some symptoms.    2 Slight disability. Able to look after own affairs without assistance, but unable to carry out all previous activities.    3 Moderate disability. Requires some help, but able to walk unassisted.    4 Moderately severe disability. Unable to attend to own bodily needs without assistance, and unable to walk unassisted.    5 Severe disability. Requires constant nursing care and attention, bedridden, incontinent.    6 Dead      PHQ-9 Depression Screening  Little interest or pleasure in doing things? 0-->not at all   Feeling down, depressed, or hopeless? 0-->not at all   Trouble falling or staying asleep, or sleeping too much?     Feeling tired or having little energy?     Poor appetite or overeating?     Feeling bad about yourself - or that you are a failure or have let yourself or your family down?     Trouble concentrating on things, such as reading the newspaper or watching television?     Moving or speaking so slowly that other people could have noticed? Or the opposite - being so fidgety or restless that you have been moving around a lot more than usual?     Thoughts that you would be better off dead, or of hurting yourself in some way?     PHQ-9 Total Score 0   If you checked off any problems, how difficult have these problems made it for you to do your work, take care of things at home, or get along with other people?         JIN Fall Risk Clinician Key Questions   Have you fallen in the past year?: No  Do you feel unsteady with walking?: Yes  Are you worried about falling?: Yes      Results review:   CT Angiogram Head    Result Date:  1/31/2024  Impression: Unremarkable CTA of the head and neck. Electronically Signed: Perico Leija MD  1/31/2024 1:21 PM EST  Workstation ID: [id number]    CT Angiogram Neck    Result Date: 1/31/2024  Impression: Unremarkable CTA of the head and neck. Electronically Signed: Perico Leija MD  1/31/2024 1:21 PM EST  Workstation ID: [id number]      Lab Results   Component Value Date    GLUCOSE 102 (H) 01/05/2024    BUN 12 01/05/2024    CREATININE 0.74 01/05/2024    EGFR 82.4 01/05/2024    BCR 16.2 01/05/2024    K 4.2 01/05/2024    CO2 29.0 01/05/2024    CALCIUM 10.3 01/05/2024    ALBUMIN 5.0 01/05/2024    BILITOT 0.4 01/05/2024    AST 16 01/05/2024    ALT 12 01/05/2024     Lipid Panel          1/23/2024    09:02   Lipid Panel   Total Cholesterol 236    Triglycerides 104    HDL Cholesterol 81    VLDL Cholesterol 18    LDL Cholesterol  137    LDL/HDL Ratio 1.66       Most Recent A1C          1/23/2024    09:02   HGBA1C Most Recent   Hemoglobin A1C 5.50         Assessment / Plan      Assessment/Plan:   # History of TIA  -continue ASA 81 mg daily   -Continue Lipitor 40 mg nightly  -30-day Holter monitor was negative for atrial fibrillation  -CTA head and neck unremarkable  -Repeat lipid panel today  -reviewed s/sx of stroke and when to call 911  -follow up in stroke clinic in 6 months    # Right leg pain  -patient has a follow-up with her orthopedic surgeon  -If no musculoskeletal reason found and consider discontinuing or reducing statin     Discussed the importance of medication compliance and lifestyle modifications (adequate blood pressure control, adequate control of hyperlipidemia, adequate glycemic control, increase physical activity, and healthy diet) to help reduce the risk of future cerebrovascular events.  Also discussed the signs symptoms that would warrant the patient return back to the emergency department including unilateral weakness, unilateral numbness, visual disturbances, loss of balance, speech  difficulties, and/or a sudden severe headache.   Follow Up:   Return in about 6 months (around 10/22/2024).    OSMAN Hinton  [facility] [place] [facility]

## 2024-09-05 NOTE — PROGRESS NOTES
Your echo showed no hole in the heart and overall was within normal limits
Continue Regimen: clobetasol 0.05 % scalp solution  - explained tx not cure\\nApply am/hs to rash prn - will use more often\\n\\nketoconazole 2 % shampoo \\nApply to scalp in shower, may also use for ears
Detail Level: Zone
Initiate Treatment: Zyrtec (certirizine) 10 mg 1-4 PO QD PRN itch
Render In Strict Bullet Format?: No

## 2024-10-10 ENCOUNTER — OFFICE VISIT (OUTPATIENT)
Dept: CARDIOLOGY | Facility: CLINIC | Age: 79
End: 2024-10-10
Payer: MEDICARE

## 2024-10-10 VITALS
HEIGHT: 60 IN | SYSTOLIC BLOOD PRESSURE: 132 MMHG | DIASTOLIC BLOOD PRESSURE: 70 MMHG | HEART RATE: 69 BPM | BODY MASS INDEX: 20.22 KG/M2 | WEIGHT: 103 LBS | OXYGEN SATURATION: 99 %

## 2024-10-10 DIAGNOSIS — I10 ESSENTIAL HYPERTENSION: ICD-10-CM

## 2024-10-10 DIAGNOSIS — I49.1 PREMATURE ATRIAL CONTRACTIONS: Primary | ICD-10-CM

## 2024-10-10 DIAGNOSIS — R42 DIZZINESS: ICD-10-CM

## 2024-10-10 DIAGNOSIS — E78.5 DYSLIPIDEMIA: ICD-10-CM

## 2024-10-10 PROCEDURE — 1159F MED LIST DOCD IN RCRD: CPT | Performed by: PHYSICIAN ASSISTANT

## 2024-10-10 PROCEDURE — 1160F RVW MEDS BY RX/DR IN RCRD: CPT | Performed by: PHYSICIAN ASSISTANT

## 2024-10-10 PROCEDURE — 3075F SYST BP GE 130 - 139MM HG: CPT | Performed by: PHYSICIAN ASSISTANT

## 2024-10-10 PROCEDURE — 3078F DIAST BP <80 MM HG: CPT | Performed by: PHYSICIAN ASSISTANT

## 2024-10-10 PROCEDURE — 99214 OFFICE O/P EST MOD 30 MIN: CPT | Performed by: PHYSICIAN ASSISTANT

## 2024-10-10 RX ORDER — HYDROXYZINE HYDROCHLORIDE 25 MG/1
25 TABLET, FILM COATED ORAL EVERY 8 HOURS PRN
Qty: 30 TABLET | Refills: 0 | Status: SHIPPED | OUTPATIENT
Start: 2024-10-10

## 2024-10-10 NOTE — PROGRESS NOTES
[facility] Cardiology    Patient ID: Magy Ellis is a 79 y.o. female.  : 1945   Contact: [phone]    Encounter date: 10/10/2024    PCP: Tai Montelongo DO      Chief complaint:   Chief Complaint   Patient presents with    Premature atrial contractions       Problem List:  Dizziness  Echo 10/2021: normal EF, no significant valve abnormality  Carotid duplex 2021: Less than 50% bilateral carotid stenosis, antegrade flow no evidence of subclavian steal  7-day Holter 2022: Normal sinus rhythm at 70 bpm, 3.6% PAC burden, short burst of SVT, rare PVCs, less than 1%.  No atrial fibrillation, pauses or heart block  Normal Flory stress 2022  MCOT, 2024: SB/SR/ST. Frequent PAC's, brief SVT, Occ PVC's. Pt events mostly correlate with PAC's and once PVC's.  Left facial numbness 24:  Carotid duplex, 2024: Bilateral ICA <50% stenosis.  Echo, 2024: EF 60%. Trace to mild MR. Mild TR.  Hypertension  Dyslipidemia  History of diverticulosis    Allergies   Allergen Reactions    Sulfa Antibiotics Hives       Current Medications:    Current Outpatient Medications:     aspirin 81 MG EC tablet, Take 1 tablet by mouth Daily., Disp: 30 tablet, Rfl: 1    atorvastatin (Lipitor) 40 MG tablet, Take 1 tablet by mouth Every Night., Disp: 30 tablet, Rfl: 11    Black Elderberry (Sambucol Black Elderberry) 1.9 GM/5ML syrup, Take 5 mL by mouth Every Night., Disp: , Rfl:     fluticasone (FLONASE) 50 MCG/ACT nasal spray, Administer 1 spray into the nostril(s) as directed by provider As Needed., Disp: , Rfl:     hydrOXYzine (ATARAX) 25 MG tablet, 1/2 to one tab tid prn anxiety, Disp: , Rfl:     lansoprazole (PREVACID) 30 MG capsule, Take 1 capsule by mouth Daily., Disp: , Rfl:     Multiple Vitamins-Minerals (MULTIVITAMIN PO), Take 1 tablet by mouth Daily., Disp: , Rfl:     Probiotic Product (Probiotic-10) chewable tablet, Chew 1 tablet Daily., Disp: , Rfl:     risedronate  (ACTONEL) 35 MG tablet, Take 1 tablet by mouth Every 7 (Seven) Days. with water on empty stomach, nothing by mouth or lie down for next 30 minutes. On Fridays., Disp: , Rfl:     Ascorbic Acid (Vitamin C) 500 MG chewable tablet, Chew 500 mg Daily. (Patient not taking: Reported on 4/22/2024), Disp: , Rfl:     vitamin B-12 (CYANOCOBALAMIN) 1000 MCG tablet, Take 1 tablet by mouth Daily. (Patient not taking: Reported on 4/22/2024), Disp: , Rfl:     Vitamin D, Cholecalciferol, 50 MCG (2000 UT) capsule, Take 1 capsule by mouth Daily. (Patient not taking: Reported on 4/22/2024), Disp: , Rfl:     HPI    Magy Ellis is a 79 y.o. female who presents today for a follow up of PACs, hyperlipidemia and cardiac risk factors. Since last visit, patient overall has been doing well.  Patient states that she has had a little depression and anxiety ever since the passing of her .  She stays busy by doing [employer] and other craft projects which helps occupy her mind.  She does state a few weeks ago she had an episode of sharp left-sided chest pain that was brief in nature.  She is also had some palpitations that were brief as well.  When she checked her heart rate during these palpitations it read 87 bpm.  Patient denies any exertional chest pain or shortness of breath.  She does state that every once in a while she will have some episodes of anxiety.  She has been prescribed hydroxyzine in the past but has not taken any in quite some time.  She is afraid to take the prescription that she had filled back in 2023 and is inquiring about a refill.  Patient does state every once a while in the morning when she wakes up she will have an episode of lightheadedness.  It is not always while she is standing.  She has not found a trigger for these events yet.      The following portions of the patient's history were reviewed and updated as appropriate: allergies, current medications and problem list.    Pertinent positives as listed in  "the HPI.  All other systems reviewed are negative.         Vitals:    10/10/24 0948   BP: 132/70   BP Location: Left arm   Patient Position: Sitting   Pulse: 69   SpO2: 99%   Weight: 46.7 kg (103 lb)   Height: 152.4 cm (60\")       Physical Exam:  General: Alert and oriented.  Neck: Jugular venous pressure is within normal limits. Carotids have normal upstrokes without bruits.   Cardiovascular: Heart has a nondisplaced focal PMI. Regular rate and rhythm. No murmur, gallop or rub.  Lungs: Clear, no rales or wheezes. Equal expansion is noted.   Extremities: Show no edema.  Skin: Warm and dry.  Neurologic: Nonfocal.     Diagnostic Data (reviewed with patient):  Lab Results   Component Value Date    GLUCOSE 102 (H) 01/05/2024    BUN 12 01/05/2024    CREATININE 0.74 01/05/2024    BCR 16.2 01/05/2024     01/05/2024    K 4.2 01/05/2024     01/05/2024    CO2 29.0 01/05/2024    CALCIUM 10.3 01/05/2024    ALBUMIN 5.0 01/05/2024    ALKPHOS 91 01/05/2024    AST 16 01/05/2024    ALT 12 01/05/2024     Lab Results   Component Value Date    CHOL 147 04/22/2024    TRIG 84 04/22/2024    HDL 75 (H) 04/22/2024    LDL 56 04/22/2024      Lab Results   Component Value Date    WBC 4.97 01/05/2024    RBC 4.46 01/05/2024    HGB 13.9 01/05/2024    HCT 42.1 01/05/2024    MCV 94.4 01/05/2024     01/05/2024      Advance Care Planning   ACP discussion was declined by the patient. Patient does not have an advance directive, declines further assistance.         Assessment:    ICD-10-CM ICD-9-CM   1. Premature atrial contractions  I49.1 427.61   2. Dizziness  R42 780.4   3. Essential hypertension  I10 401.9   4. Dyslipidemia  E78.5 272.4         Plan:  Stable cardiac status.  Discussed with the patient her episodes could be related to PACs.  If she has more frequent episodes she can contact us back and she can wear another monitor.  Continue on aspirin 81 mg for antiplatelet therapy.   Continue on atorvastatin 40 mg daily for " hyperlipidemia.  LDL is to goal.  She may even be able to decrease her dose in the future if needed.  Continue all other current medications.  F/up in 12 months, sooner if needed.    Farrah River PA-C

## 2024-10-22 ENCOUNTER — OFFICE VISIT (OUTPATIENT)
Dept: NEUROLOGY | Facility: CLINIC | Age: 79
End: 2024-10-22
Payer: MEDICARE

## 2024-10-22 VITALS
HEART RATE: 73 BPM | DIASTOLIC BLOOD PRESSURE: 78 MMHG | SYSTOLIC BLOOD PRESSURE: 124 MMHG | WEIGHT: 103.7 LBS | HEIGHT: 60 IN | OXYGEN SATURATION: 99 % | TEMPERATURE: 97.1 F | BODY MASS INDEX: 20.36 KG/M2

## 2024-10-22 DIAGNOSIS — Z86.73 HISTORY OF TIA (TRANSIENT ISCHEMIC ATTACK): Primary | ICD-10-CM

## 2024-10-22 DIAGNOSIS — H81.10 BENIGN PAROXYSMAL POSITIONAL VERTIGO, UNSPECIFIED LATERALITY: ICD-10-CM

## 2024-10-22 DIAGNOSIS — I10 ESSENTIAL HYPERTENSION: ICD-10-CM

## 2024-10-22 NOTE — PROGRESS NOTES
Stroke Clinic Office Visit     Encounter Date: 10/22/2024   Patient Name: Magy Ellis  : 1945  MRN: [id number]   PCP: Tai Montelongo DO  Referring Provider: No ref. provider found     Chief Complaint Follow-up and Transient Ischemic Attack    History of Present Illness  History of Present Illness  The patient is an 79-year-old right handed female here for a 6-month follow-up visit at the stroke clinic.    Past medical history significant for HTN, heart murmur, GERD, right hip fracture, and arthritis.  The patient presented to [facility] ED on 2024 with complaints of left facial numbness, moderate headache, chest pain and SOB.  The patient underwent a complete stroke/TIA workup which was negative for acute ischemic stroke.  The patient was discharged on 81 mg aspirin with a diagnosis of TIA.     She reports no symptoms of stroke/TIA, such as new areas of weakness or loss of vision. She is able to perform all her usual activities, except for those limited by a hip fracture sustained 4 years ago. She experiences episodes of dizziness, primarily in the mornings, which she attributes to possible dehydration. She has been increasing her water intake to address this. The dizziness is described as a sensation of the room spinning when she gets up and walks, occurring approximately once a month. She also reports feeling off balance while walking around her house. She does not experience any associated nausea or vomiting.  She maintains an active lifestyle and performs exercises every morning.  She does not experience depression but admits to feelings of sadness due to the recent loss of her . Patient denies any other neurological symptoms, including: headache, vision changes, dysesthesias, loss of consciousness, seizure or new areas of motor weakness.             Subjective     Past Medical History:   Diagnosis Date    Acid reflux     Arthritis     Headache, tension-type     Heart murmur     Hypertension      Osteoporosis     Ulcer       Past Surgical History:   Procedure Laterality Date    COLONOSCOPY      EXPLORATORY LAPAROTOMY N/A 06/26/2017    Procedure: LAPAROTOMY EXPLORATORY WITH LYSIS OF ADHESIONS; REDUCTION OF HERNIA  ;  Surgeon: Sami Griffiths MD;  Location: [facility] OR;  Service:     HIP TROCHANTERIC NAILING WITH INTRAMEDULLARY HIP SCREW Right 04/12/2020    Procedure: HIP TROCHANTERIC NAILING WITH INTRAMEDULLARY HIP SCREW;  Surgeon: Kash Akins Jr., MD;  Location: [facility] OR;  Service: Orthopedics;  Laterality: Right;    TUBAL ABDOMINAL LIGATION       Family History   Problem Relation Age of Onset    Asthma Mother     Throat cancer Father     Heart attack Father     No Known Problems Sister     No Known Problems Sister     No Known Problems Brother     No Known Problems Daughter     No Known Problems Son     No Known Problems Son       Social History     Socioeconomic History    Marital status:    Tobacco Use    Smoking status: Never     Passive exposure: Never    Smokeless tobacco: Never   Vaping Use    Vaping status: Never Used   Substance and Sexual Activity    Alcohol use: Never    Drug use: Never    Sexual activity: Not Currently       Current Outpatient Medications:     aspirin 81 MG EC tablet, Take 1 tablet by mouth Daily., Disp: 30 tablet, Rfl: 1    atorvastatin (Lipitor) 40 MG tablet, Take 1 tablet by mouth Every Night., Disp: 30 tablet, Rfl: 11    Black Elderberry (Sambucol Black Elderberry) 1.9 GM/5ML syrup, Take 5 mL by mouth Every Night., Disp: , Rfl:     fluticasone (FLONASE) 50 MCG/ACT nasal spray, Administer 1 spray into the nostril(s) as directed by provider As Needed., Disp: , Rfl:     hydrOXYzine (ATARAX) 25 MG tablet, Take 1 tablet by mouth Every 8 (Eight) Hours As Needed for Anxiety., Disp: 30 tablet, Rfl: 0    lansoprazole (PREVACID) 30 MG capsule, Take 1 capsule by mouth Daily., Disp: , Rfl:     Multiple Vitamins-Minerals (MULTIVITAMIN PO), Take 1 tablet by mouth Daily.,  "Disp: , Rfl:     Probiotic Product (Probiotic-10) chewable tablet, Chew 1 tablet Daily., Disp: , Rfl:     risedronate (ACTONEL) 35 MG tablet, Take 1 tablet by mouth Every 7 (Seven) Days. with water on empty stomach, nothing by mouth or lie down for next 30 minutes. On Fridays., Disp: , Rfl:    Allergies   Allergen Reactions    Sulfa Antibiotics Hives        Objective     Physical Exam:  Vitals:    10/22/24 0910   BP: 124/78   Pulse: 73   Temp: 97.1 °F (36.2 °C)   SpO2: 99%   Weight: 47 kg (103 lb 11.2 oz)   Height: 152.4 cm (60\")      Body mass index is 20.25 kg/m².     Physical Exam:  General Appearance: Alert  Eyes: Anicteric sclera  HEENT: no scleral injection   Lungs: respirations appear comfortable, no obvious increased work of breathing  Extremities: No cyanosis or fingernail clubbing   Skin: No rashes in exposed skin areas     Neurological Examination:   Mental status: Alert and oriented to person, place, and time. Speech with no dysarthria, able to name and repeat with no difficulty.    Cranial Nerves: Visual fields intact. Extraocular movements are intact with no nystagmus. Facial sensation intact. Face symmetrical. Hearing grossly intact. Palate movement is symmetric. Full shoulder shrug bilaterally. Tongue protrudes midline.   Sensory: Sensory exam to light touch in all four extremities distally is normal. Double simultaneous sensory stimulation shows no extinction  Motor: Normal tone throughout. Normal bulk. Pronator drift is absent.  Left upper extremity: 5/5 deltoid, tricep, bicep, interosseous, hand .   Right upper extremity: 5/5 deltoid, tricep, bicep, interosseous, hand .   Left lower extremity: 5/5 iliopsoas, knee extension/flexion, foot dorsi/plantarflexion.  Right lower extremity: 5/5 iliopsoas, knee extension/flexion, foot dorsi/plantarflexion.  Cerebellar: Finger-to-nose intact. Heel-to-shin intact. Rapid alternating movements are intact.   Gait: Normal.        Over the past month…  "   Snoring:   Do you snore loudly (loud enough to be heard through closed doors or your bed partner elbows you for snoring at night)?   No   Tired:   Do you often feel tired, fatigued or sleepy during the daytime (such as falling asleep during driving or talking to someone)?   No   Observed:   Has anyone observed you stop breathing or choking/gasping during your sleep?   No   Pressure:   Do you have or are you being treated for high blood pressure?   Yes   Body Mass Index:   Is the BMI > 35kg/m2 ?   BMI = Body mass index is 20.25 kg/m².   No   Age:   Older than 50?   Yes no   Neck Size:   Is your shirt collar > 16 inches/40 cm or larger? (measured around Ridley apple).   Neck Size =            Gender:   Male/Female?   Female   Total Score:      0-2 = BERNADETTE Low Risk    3-4 = BERNADETTE Intermediate Risk    5-8 = BERNADETTE High Risk  Or >2 + male gender  Or >2 + BMI > 35kg/m2  Or > 2 + neck circumference 16 inches / 40cm       PHQ-9 Depression Screening  Little interest or pleasure in doing things? Not at all   Feeling down, depressed, or hopeless? Several days   PHQ-2 Total Score 1   Trouble falling or staying asleep, or sleeping too much?     Feeling tired or having little energy?     Poor appetite or overeating?     Feeling bad about yourself - or that you are a failure or have let yourself or your family down?     Trouble concentrating on things, such as reading the newspaper or watching television?     Moving or speaking so slowly that other people could have noticed? Or the opposite - being so fidgety or restless that you have been moving around a lot more than usual?     Thoughts that you would be better off dead, or of hurting yourself in some way?     PHQ-9 Total Score     If you checked off any problems, how difficult have these problems made it for you to do your work, take care of things at home, or get along with other people?          JIN Fall Risk Clinician Key Questions   Have you fallen in the past year?: No  Do  you feel unsteady with walking?: No  Are you worried about falling?: Yes  Stay Idependant Patient Questions   Patient Fall Risk Assessment Score : 0  Fall Risk Category  Fall Risk Category: Moderate    Laboratory Results:   Hemoglobin   Date Value Ref Range Status   01/05/2024 13.9 12.0 - 15.9 g/dL Final     Hematocrit   Date Value Ref Range Status   01/05/2024 42.1 34.0 - 46.6 % Final     Platelets   Date Value Ref Range Status   01/05/2024 358 140 - 450 10*3/mm3 Final     Hemoglobin A1C   Date Value Ref Range Status   01/23/2024 5.50 4.80 - 5.60 % Final     LDL Cholesterol    Date Value Ref Range Status   04/22/2024 56 0 - 100 mg/dL Final     AST (SGOT)   Date Value Ref Range Status   01/05/2024 16 1 - 32 U/L Final     ALT (SGPT)   Date Value Ref Range Status   01/05/2024 12 1 - 33 U/L Final           Assessment / Plan      Assessment and Plan    Assessment & Plan  1. Benign positional peripheral vertigo.  Her MRI and TIA workup results are negative.  PMH: Hypertension, heart murmur, [facility] ED, arthritis, and a right hip fracture 4 years ago.  Patient has minimal stroke risk factors.   The dizziness episodes are likely due to benign positional peripheral vertigo, possibly caused by loose crystals in the inner ear. A consultation with ENT recommended for potential ear irrigation. A referral for vestibular therapy was also suggested, where she can learn Epley maneuvers to manage dizziness episodes at home. She was advised to continue her regimen of aspirin 81 mg, regular exercise, and adherence to a heart healthy/Mediterranean diet.      2. TIA  3. HTN   4. HLD  -Continue 81 mg of aspirin   -Continue 80 mg of Atorvastatin  -Exercise 30 minutes 3 - 4 times a day  -Heart and Brain Healthy diet, Consider a Mediterranean Diet.   -Continue tight control of blood sugars  -Journal BP at home and call PCP with persistent BP >140/90  -Follow up in 1 year  -The patient was advised to follow up with her primary care physician for  "ongoing management and screening for hypertension, hypercholesterolemia, and diabetes.   -The patient was counseled on long-term blood pressure goal less than 120/80, long-term LDL goal less than 100, This was also printed for the patient in the after visit summary.  -The patient was counseled she experiences symptoms of acute onset unilateral arm, leg, or face weakness/numbness/tingling, unilateral facial droop, slurred speech/word finding difficulty, visual disturbance (\"curtain falling\" or visual field loss), or severe headache she should call 911 and present to the nearest emergency department immediately.    I spent 30 minutes caring for Magy Esteban on this date of service. This time includes time spent by me in the following activities:reviewing tests, performing a medically appropriate examination and/or evaluation , counseling and educating the patient/family/caregiver, ordering medications, tests, or procedures, and documenting information in the medical record    Follow Up  No follow-ups on file.    Patient or patient representative verbalized consent for the use of Ambient Listening during the visit with  OSMAN Staley for chart documentation. 10/22/2024  10:12 EDT    10/22/2024  08:36 EDT    OSMAN Staley  [facility]     Part of this note may be an electronic transcription/translation of spoken language to printed text using the Dragon Dictation System.  "

## 2024-10-25 ENCOUNTER — PATIENT ROUNDING (BHMG ONLY) (OUTPATIENT)
Dept: NEUROLOGY | Facility: CLINIC | Age: 79
End: 2024-10-25
Payer: MEDICARE

## 2024-10-25 NOTE — PROGRESS NOTES
October 25, 2024    Hello, may I speak with Magy Ellis?    My name is Idania      I am  with [facility]  [address]  [place] [address] [phone].    Before we get started may I verify your date of birth? 1945    I am calling to officially welcome you to our practice and ask about your recent visit. Is this a good time to talk? no    Tell me about your visit with us. What things went well?         We're always looking for ways to make our patients' experiences even better. Do you have recommendations on ways we may improve?  no    Overall were you satisfied with your first visit to our practice? yes       I appreciate you taking the time to speak with me today. Is there anything else I can do for you? no      Thank you, and have a great day.

## 2024-11-08 ENCOUNTER — TREATMENT (OUTPATIENT)
Dept: PHYSICAL THERAPY | Facility: CLINIC | Age: 79
End: 2024-11-08
Payer: MEDICARE

## 2024-11-08 DIAGNOSIS — R42 VERTIGO: Primary | ICD-10-CM

## 2024-11-08 NOTE — PROGRESS NOTES
"Physical Therapy Initial Evaluation and Plan of Care  [address], [address], [place] [id number]    Patient: Magy Ellis   : 1945  Diagnosis/ICD-10 Code:  Vertigo [R42]  Referring practitioner: OSMAN Staley  Date of Initial Visit: 2024  Today's Date: 2024  Patient seen for 1 session         Visit Diagnoses:    ICD-10-CM ICD-9-CM   1. Vertigo  R42 780.4         Subjective Questionnaire: DHI: 16      Subjective Evaluation    History of Present Illness  Date of onset: 2022  Mechanism of injury: Patient notes episodes of dizziness upon waking can last for up to half a day and she relieves with rest.  Feels more unsteady with her gait has episodes where her head will feel \"full\" or \"heavy\". Feels funny when she leans forward to tie her shoes or put her socks on.  Has had a sensation where the room was spinning upon waking up but it has been several years and was worse when she laid in bed. Has history of OA and bone density loss as well as a recent loss of a spouse in the last year. Doesn't take a lot of medications, takes meds for GI, osteoporosis, and baby aspirin. Lives in [place] alone has a dtr locally and is independent.       Patient Occupation: retired factory work in [place]         Objective Testing:                                                                   Assessment & Plan       Assessment  Impairments: impaired balance and safety issue   Functional limitations: carrying objects, walking, standing and unable to perform repetitive tasks   Assessment details: Patient presents with two years of episodic dizziness and unsteadiness where her head feels heavy or \"full\".  She has a history of vertigo but not in several years.  She has a fairly unremarkable health history and limited medication exposure.  Did not see any significant findings on a vertigo screen and did not perform positional testing based her presentation.  She was symptomatic with smooth pursuit " only and we started working on VHE with this.  Plan to see if this addresses her symptoms, but likely etiology may not be related to the vestibular system.     Goals  Plan Goals: SHORT TERM GOALS: To be met in 2 weeks:  1. Patient is independent with HEP.  2. Patient will report at least 30% improvement in overall condition.    LONG TERM GOALS:To be met in 4 weeks:  1. Patient will report decreased disequilibrium/dizziness by at least 90%.  2. Patient will report no loss of balance with ADLs to demonstrate improved functional balance.  3. Patient denies dizziness with daily activity.  4. DHI score is less than 10.     Plan  Therapy options: will be seen for skilled therapy services  Planned therapy interventions: neuromuscular re-education and balance/weight-bearing training  Frequency: 2x month  Duration in visits: 3          REHAB POTENTIAL: guarded                History # of Personal Factors and/or Comorbidities: MODERATE (1-2)  Examination of Body System(s): # of elements: LOW (1-2)  Clinical Presentation: STABLE   Clinical Decision Making: LOW       Timed:         Manual Therapy:    0     mins  61840;     Therapeutic Exercise:    0     mins  73796;     Neuromuscular Phil:    10    mins  87942;    Therapeutic Activity:     0     mins  42616;     Gait Trainin     mins  25668;     Ultrasound:     0     mins  35075;    Ionto                               0    mins   57482  Self Care                       0     mins   59121  Canalith Repos    0     mins 58688      Un-Timed:  Electrical Stimulation:    0     mins  70518 ( );  Dry Needling     0     mins self-pay  Traction     0     mins 89592  Low Eval     25     Mins  36329  Mod Eval     0     Mins  15736  High Eval                       0     Mins  69921        Timed Treatment:   10   mins   Total Treatment:     35   mins          PT: Julián Chao PT     License Number: [id number]    Electronically signed by Julián Chao PT, 24, 11:07 AM  EST    Certification Period: 11/8/2024 thru 2/5/2025  I certify that the therapy services are furnished while this patient is under my care.  The services outlined above are required by this patient, and will be reviewed every 90 days.         Physician Signature:__________________________________________________    PHYSICIAN: Karen Miles APRN  NPI: [id number]      DATE:     Please sign and return via fax to [email or web address] . Thank you, [facility].

## 2025-01-15 DIAGNOSIS — E78.5 HYPERLIPIDEMIA, UNSPECIFIED HYPERLIPIDEMIA TYPE: ICD-10-CM

## 2025-01-15 RX ORDER — ATORVASTATIN CALCIUM 40 MG/1
40 TABLET, FILM COATED ORAL NIGHTLY
Qty: 90 TABLET | Refills: 2 | Status: SHIPPED | OUTPATIENT
Start: 2025-01-15 | End: 2026-01-15

## 2025-01-15 NOTE — TELEPHONE ENCOUNTER
Rx Refill Note  Requested Prescriptions     Pending Prescriptions Disp Refills    atorvastatin (Lipitor) 40 MG tablet 30 tablet 11     Sig: Take 1 tablet by mouth Every Night.      Last office visit with prescribing clinician: 4/22/2024   Last telemedicine visit with prescribing clinician: Visit date not found   Next office visit with prescribing clinician: 10/22/2025   Alesha Catherine MA  01/15/25, 10:35 EST

## 2025-03-17 NOTE — PROGRESS NOTES
Patient: Magy Ellis    YOB: 1945    Date: 03/19/2025    Primary Care Provider: Tai Montelongo DO    Chief Complaint   Patient presents with    Abdominal Pain              SUBJECTIVE:    History of present illness:  The patient is in the office today for evaluation and treatment of abdominal pain. Patient had recent ultrasound that did show a gallbladder stone on 03/07/25. She complains of pain in the RUQ with associated nausea for two months.  Patient had previous laparotomy for internal hernia.  Patient has pain with nausea vomiting and epigastric discomfort almost daily.      The following portions of the patient's history were reviewed and updated as appropriate: allergies, current medications, past family history, past medical history, past social history, past surgical history and problem list.      Review of Systems   Constitutional:  Negative for chills, fever and unexpected weight change.   HENT:  Negative for hearing loss, trouble swallowing and voice change.    Eyes:  Negative for visual disturbance.   Respiratory:  Negative for apnea, cough, chest tightness, shortness of breath and wheezing.    Cardiovascular:  Negative for chest pain, palpitations and leg swelling.   Gastrointestinal:  Positive for abdominal pain and nausea. Negative for abdominal distention, anal bleeding, blood in stool, constipation, diarrhea, rectal pain and vomiting.   Endocrine: Negative for cold intolerance and heat intolerance.   Genitourinary:  Negative for difficulty urinating, dysuria and flank pain.   Musculoskeletal:  Negative for back pain and gait problem.   Skin:  Negative for color change, rash and wound.   Neurological:  Negative for dizziness, syncope, speech difficulty, weakness, light-headedness, numbness and headaches.   Hematological:  Negative for adenopathy. Does not bruise/bleed easily.   Psychiatric/Behavioral:  Negative for confusion. The patient is not nervous/anxious.           Allergies:  Allergies   Allergen Reactions    Sulfa Antibiotics Hives       Medications:    Current Outpatient Medications:     aspirin 81 MG EC tablet, Take 1 tablet by mouth Daily., Disp: 30 tablet, Rfl: 1    atorvastatin (Lipitor) 40 MG tablet, Take 1 tablet by mouth Every Night., Disp: 90 tablet, Rfl: 2    Black Elderberry (Sambucol Black Elderberry) 1.9 GM/5ML syrup, Take 5 mL by mouth Every Night., Disp: , Rfl:     fluticasone (FLONASE) 50 MCG/ACT nasal spray, Administer 1 spray into the nostril(s) as directed by provider As Needed., Disp: , Rfl:     hydrOXYzine (ATARAX) 25 MG tablet, Take 1 tablet by mouth Every 8 (Eight) Hours As Needed for Anxiety., Disp: 30 tablet, Rfl: 0    lansoprazole (PREVACID) 30 MG capsule, Take 1 capsule by mouth Daily., Disp: , Rfl:     Multiple Vitamins-Minerals (MULTIVITAMIN PO), Take 1 tablet by mouth Daily., Disp: , Rfl:     Probiotic Product (Probiotic-10) chewable tablet, Chew 1 tablet Daily., Disp: , Rfl:     risedronate (ACTONEL) 35 MG tablet, Take 1 tablet by mouth Every 7 (Seven) Days. with water on empty stomach, nothing by mouth or lie down for next 30 minutes. On Fridays., Disp: , Rfl:     History:  Past Medical History:   Diagnosis Date    Acid reflux     Arthritis     Cholelithiasis 3/1/2025?    Diverticulitis of colon     Headache, tension-type     Heart murmur     Hypertension     Osteoporosis     Shortness of breath     Ulcer        Past Surgical History:   Procedure Laterality Date    COLONOSCOPY      EXPLORATORY LAPAROTOMY N/A 06/26/2017    Procedure: LAPAROTOMY EXPLORATORY WITH LYSIS OF ADHESIONS; REDUCTION OF HERNIA  ;  Surgeon: Sami Griffiths MD;  Location: [facility] OR;  Service:     FRACTURE SURGERY  4/12/2020    HIP TROCHANTERIC NAILING WITH INTRAMEDULLARY HIP SCREW Right 04/12/2020    Procedure: HIP TROCHANTERIC NAILING WITH INTRAMEDULLARY HIP SCREW;  Surgeon: Kash Akins Jr., MD;  Location: [facility] OR;  Service: Orthopedics;  Laterality: Right;     "TUBAL ABDOMINAL LIGATION         Family History   Problem Relation Age of Onset    Asthma Mother     Throat cancer Father     Heart attack Father     Cancer Father     Heart disease Father     Cancer Sister     Hearing loss Sister     No Known Problems Brother     No Known Problems Daughter     No Known Problems Son     No Known Problems Son        Social History     Tobacco Use    Smoking status: Never     Passive exposure: Never    Smokeless tobacco: Never   Vaping Use    Vaping status: Never Used   Substance Use Topics    Alcohol use: Never    Drug use: Never        OBJECTIVE:    Vital Signs:   Vitals:    03/19/25 1047   BP: 128/76   Pulse: 69   Temp: 98.2 °F (36.8 °C)   SpO2: 96%   Weight: 47.6 kg (105 lb)   Height: 152.4 cm (60\")       Physical Exam:       Lungs-clear  Heart-regular rate without murmur  Abdomen-tender right upper quadrant with guarding and mild rebound.  Well-healed midline scar    Results Review:   I reviewed the patient's new clinical results.    Review of Systems was reviewed and confirmed as accurate as documented by the MA.    ASSESSMENT/PLAN:    1. Calculus of gallbladder with cholecystitis without biliary obstruction, unspecified cholecystitis acuity    2. Right upper quadrant abdominal pain        Patient is scheduled for laparoscopic cholecystectomy with cholangiogram.  Risk of bleeding infection possibility of injury to bowel and duct with a laparoscopic approach and postop bile leak discussed and patient agreeable.  She had no further questions to proceed with surgery.  She understands we will do a open Pittman technique with 10 camera.  Maintain low-fat diet in interim.          Electronically signed by Sami Griffiths MD  03/19/25          "

## 2025-03-19 ENCOUNTER — OFFICE VISIT (OUTPATIENT)
Dept: SURGERY | Facility: CLINIC | Age: 80
End: 2025-03-19
Payer: MEDICARE

## 2025-03-19 VITALS
SYSTOLIC BLOOD PRESSURE: 128 MMHG | HEART RATE: 69 BPM | BODY MASS INDEX: 20.62 KG/M2 | DIASTOLIC BLOOD PRESSURE: 76 MMHG | TEMPERATURE: 98.2 F | WEIGHT: 105 LBS | OXYGEN SATURATION: 96 % | HEIGHT: 60 IN

## 2025-03-19 DIAGNOSIS — K80.10 CALCULUS OF GALLBLADDER WITH CHOLECYSTITIS WITHOUT BILIARY OBSTRUCTION, UNSPECIFIED CHOLECYSTITIS ACUITY: Primary | ICD-10-CM

## 2025-03-19 DIAGNOSIS — R10.11 RIGHT UPPER QUADRANT ABDOMINAL PAIN: ICD-10-CM

## 2025-03-19 PROCEDURE — 99203 OFFICE O/P NEW LOW 30 MIN: CPT | Performed by: SURGERY

## 2025-03-19 PROCEDURE — 1160F RVW MEDS BY RX/DR IN RCRD: CPT | Performed by: SURGERY

## 2025-03-19 PROCEDURE — 1159F MED LIST DOCD IN RCRD: CPT | Performed by: SURGERY

## 2025-03-19 PROCEDURE — 3074F SYST BP LT 130 MM HG: CPT | Performed by: SURGERY

## 2025-03-19 PROCEDURE — 3078F DIAST BP <80 MM HG: CPT | Performed by: SURGERY

## 2025-03-19 RX ORDER — SODIUM CHLORIDE 9 MG/ML
100 INJECTION, SOLUTION INTRAVENOUS CONTINUOUS
OUTPATIENT
Start: 2025-03-19 | End: 2025-03-19

## 2025-03-21 ENCOUNTER — TELEPHONE (OUTPATIENT)
Dept: SURGERY | Facility: CLINIC | Age: 80
End: 2025-03-21
Payer: MEDICARE

## 2025-03-21 ENCOUNTER — PATIENT ROUNDING (BHMG ONLY) (OUTPATIENT)
Dept: SURGERY | Facility: CLINIC | Age: 80
End: 2025-03-21
Payer: MEDICARE

## 2025-03-21 NOTE — PROGRESS NOTES
March 21, 2025    Hello, may I speak with Magy Ellis?    My name is LAURYN DINH    I am  with [facility]  [address]  [place] [address] [phone].    Before we get started may I verify your date of birth? 1945    I am calling to officially welcome you to our practice and ask about your recent visit. Is this a good time to talk? yes    Tell me about your visit with us. What things went well?  EVERYTHING WAS GREAT       We're always looking for ways to make our patients' experiences even better. Do you have recommendations on ways we may improve?  no, EVERYONE WAS SO KIND    Overall were you satisfied with your first visit to our practice? yes       I appreciate you taking the time to speak with me today. Is there anything else I can do for you? no      Thank you, and have a great day.

## 2025-04-01 ENCOUNTER — OUTSIDE FACILITY SERVICE (OUTPATIENT)
Dept: SURGERY | Facility: CLINIC | Age: 80
End: 2025-04-01
Payer: MEDICARE

## 2025-04-01 DIAGNOSIS — K81.9 CHOLECYSTITIS: Primary | ICD-10-CM

## 2025-04-01 RX ORDER — ONDANSETRON 4 MG/1
4 TABLET, FILM COATED ORAL DAILY PRN
Qty: 30 TABLET | Refills: 1 | Status: SHIPPED | OUTPATIENT
Start: 2025-04-01 | End: 2026-04-01

## 2025-04-01 RX ORDER — HYDROCODONE BITARTRATE AND ACETAMINOPHEN 5; 325 MG/1; MG/1
1 TABLET ORAL EVERY 6 HOURS PRN
Qty: 14 TABLET | Refills: 0 | Status: SHIPPED | OUTPATIENT
Start: 2025-04-01

## 2025-04-07 NOTE — PROGRESS NOTES
"Patient: Magy Ellis    YOB: 1945    Date: 04/11/2025    Primary Care Provider: Tai Montelongo DO    Chief Complaint   Patient presents with    Post-op Follow-up     Lap deena        History of present illness:  I saw the patient in the office today as a followup from their recent lap deena.  They state that they have done well post-operatively and are having no complaints.    Vital Signs:   Vitals:    04/11/25 1244   Temp: 96.9 °F (36.1 °C)   TempSrc: Infrared   Weight: 47.6 kg (105 lb)   Height: 152.4 cm (60\")       Physical Exam:    Abdomen-wounds look good, no distention or tenderness     Assessment / Plan :    1. Postoperative visit      Resume regular activity and diet.  Follow-up as needed    Electronically signed by Sami Griffiths MD  04/11/25                "

## 2025-04-11 ENCOUNTER — OFFICE VISIT (OUTPATIENT)
Dept: SURGERY | Facility: CLINIC | Age: 80
End: 2025-04-11
Payer: MEDICARE

## 2025-04-11 VITALS — HEIGHT: 60 IN | WEIGHT: 105 LBS | BODY MASS INDEX: 20.62 KG/M2 | TEMPERATURE: 96.9 F

## 2025-04-11 DIAGNOSIS — Z48.89 POSTOPERATIVE VISIT: Primary | ICD-10-CM

## 2025-04-11 PROCEDURE — 1159F MED LIST DOCD IN RCRD: CPT | Performed by: SURGERY

## 2025-04-11 PROCEDURE — 99024 POSTOP FOLLOW-UP VISIT: CPT | Performed by: SURGERY

## 2025-04-11 PROCEDURE — 1160F RVW MEDS BY RX/DR IN RCRD: CPT | Performed by: SURGERY

## (undated) DEVICE — 3M™ STERI-STRIP™ REINFORCED ADHESIVE SKIN CLOSURES, R1547, 1/2 IN X 4 IN (12 MM X 100 MM), 6 STRIPS/ENVELOPE: Brand: 3M™ STERI-STRIP™

## (undated) DEVICE — COVER,MAYO STAND,STERILE: Brand: MEDLINE

## (undated) DEVICE — PK MAJ FX HIP 10

## (undated) DEVICE — RICH MAJOR PROCEDURE: Brand: MEDLINE INDUSTRIES, INC.

## (undated) DEVICE — DRSNG WND BORDR/ADHS NONADHR/GZ LF 4X10IN STRL

## (undated) DEVICE — DRAPE,TOP,102X53,STERILE: Brand: MEDLINE

## (undated) DEVICE — BNDG ELAS CO-FLEX SLF ADHR 4IN5YD LF STRL

## (undated) DEVICE — PAD UNDERCAST WYTEX 4IN 4YD LF STRL

## (undated) DEVICE — SUT VIC 3/0 SH 27IN J416H

## (undated) DEVICE — PROVIDES A STERILE INTERFACE BETWEEN THE OPERATING ROOM SURGICAL LAMPS (NON-STERILE) AND THE SURGEON OR NURSE (STERILE).: Brand: STERION®CLAMP COVER FABRIC

## (undated) DEVICE — ELECTRODE, PTFE, BLADE 6': Brand: MEDLINE

## (undated) DEVICE — CUFF SCD HEMOFORCE SEQ CALF STD MD

## (undated) DEVICE — DRSNG WND GZ PAD BORDERED 4X8IN STRL

## (undated) DEVICE — BIT DRL 3FLUT QC CALIB 4.2X330X100MM

## (undated) DEVICE — DRSNG WND GZ PAD BORDERED LF 4X5IN STRL

## (undated) DEVICE — Device

## (undated) DEVICE — ANTIBACTERIAL UNDYED BRAIDED (POLYGLACTIN 910), SYNTHETIC ABSORBABLE SUTURE: Brand: COATED VICRYL

## (undated) DEVICE — GLV SURG SENSICARE PI ORTHO SZ8 LF STRL

## (undated) DEVICE — DRSNG SURG AQUACEL AG 9X15CM

## (undated) DEVICE — SNAP KOVER: Brand: UNBRANDED

## (undated) DEVICE — TOTAL TRAY, 16FR 10ML SIL FOLEY, URN: Brand: MEDLINE

## (undated) DEVICE — TOWEL,OR,DSP,ST,WHITE,DLX,XR,2/PK,40PK/C: Brand: MEDLINE

## (undated) DEVICE — SUT SILK 3/0 SH CR8 18IN C013D

## (undated) DEVICE — SUT SILK 0 TIES 30IN A306H

## (undated) DEVICE — SPNG LAP 18X18IN LF STRL PK/5

## (undated) DEVICE — SPK10295 ORTHOPEDIC FRACTURE KIT: Brand: SPK10295 ORTHOPEDIC FRACTURE KIT

## (undated) DEVICE — PAD ARMBRD SURG CONVOL 7.5X20X2IN

## (undated) DEVICE — GLV SURG PREMIERPRO GAMMEX NEOPRN PF SZ8 GRN

## (undated) DEVICE — GW FOR TROCH NAIL 3.2X400MM

## (undated) DEVICE — GOWN,REINF,POLY,ECL,PP SLV,XL: Brand: MEDLINE

## (undated) DEVICE — PROXIMATE RH ROTATING HEAD SKIN STAPLERS (35 WIDE) CONTAINS 35 STAINLESS STEEL STAPLES: Brand: PROXIMATE

## (undated) DEVICE — SUT SILK 2/0 SUTUPAK TIES 24IN SA75H

## (undated) DEVICE — SUT PROLN 0/0 CTX 30IN 8454H

## (undated) DEVICE — SUT PDS 1 TP1 48IN Z880G BX/12

## (undated) DEVICE — TP SXN YANKR BULB STRL

## (undated) DEVICE — SPNG GZ WOVN 4X4IN 12PLY 10/BX STRL

## (undated) DEVICE — UNDYED BRAIDED (POLYGLACTIN 910), SYNTHETIC ABSORBABLE SUTURE: Brand: COATED VICRYL